# Patient Record
Sex: MALE | Race: WHITE | Employment: FULL TIME | URBAN - METROPOLITAN AREA
[De-identification: names, ages, dates, MRNs, and addresses within clinical notes are randomized per-mention and may not be internally consistent; named-entity substitution may affect disease eponyms.]

---

## 2018-01-16 ENCOUNTER — TELEPHONE (OUTPATIENT)
Dept: FAMILY MEDICINE CLINIC | Facility: CLINIC | Age: 63
End: 2018-01-16

## 2018-01-16 DIAGNOSIS — Z13.220 LIPID SCREENING: ICD-10-CM

## 2018-01-16 DIAGNOSIS — I10 ESSENTIAL HYPERTENSION, BENIGN: Primary | ICD-10-CM

## 2018-01-16 NOTE — TELEPHONE ENCOUNTER
Pt made physical appt and needs lab order to THE [facility].

## 2018-01-19 ENCOUNTER — APPOINTMENT (OUTPATIENT)
Dept: LAB | Age: 63
End: 2018-01-19
Attending: FAMILY MEDICINE
Payer: COMMERCIAL

## 2018-01-19 LAB
ALBUMIN SERPL-MCNC: 4.2 G/DL (ref 3.5–4.8)
ALP LIVER SERPL-CCNC: 62 U/L (ref 45–117)
ALT SERPL-CCNC: 58 U/L (ref 17–63)
AST SERPL-CCNC: 27 U/L (ref 15–41)
BILIRUB SERPL-MCNC: 0.7 MG/DL (ref 0.1–2)
BUN BLD-MCNC: 22 MG/DL (ref 8–20)
CALCIUM BLD-MCNC: 9.4 MG/DL (ref 8.3–10.3)
CHLORIDE: 105 MMOL/L (ref 101–111)
CHOLEST SMN-MCNC: 175 MG/DL (ref ?–200)
CO2: 30 MMOL/L (ref 22–32)
CREAT BLD-MCNC: 1.06 MG/DL (ref 0.7–1.3)
GLUCOSE BLD-MCNC: 103 MG/DL (ref 70–99)
HDLC SERPL-MCNC: 40 MG/DL (ref 45–?)
HDLC SERPL: 4.38 {RATIO} (ref ?–4.97)
LDLC SERPL CALC-MCNC: 105 MG/DL (ref ?–130)
M PROTEIN MFR SERPL ELPH: 7.4 G/DL (ref 6.1–8.3)
NONHDLC SERPL-MCNC: 135 MG/DL (ref ?–130)
POTASSIUM SERPL-SCNC: 4.5 MMOL/L (ref 3.6–5.1)
SODIUM SERPL-SCNC: 140 MMOL/L (ref 136–144)
TRIGL SERPL-MCNC: 148 MG/DL (ref ?–150)
VLDLC SERPL CALC-MCNC: 30 MG/DL (ref 5–40)

## 2018-01-19 PROCEDURE — 80053 COMPREHEN METABOLIC PANEL: CPT | Performed by: FAMILY MEDICINE

## 2018-01-19 PROCEDURE — 36415 COLL VENOUS BLD VENIPUNCTURE: CPT | Performed by: FAMILY MEDICINE

## 2018-01-19 PROCEDURE — 80061 LIPID PANEL: CPT | Performed by: FAMILY MEDICINE

## 2018-01-22 ENCOUNTER — OFFICE VISIT (OUTPATIENT)
Dept: FAMILY MEDICINE CLINIC | Facility: CLINIC | Age: 63
End: 2018-01-22

## 2018-01-22 VITALS
HEART RATE: 60 BPM | SYSTOLIC BLOOD PRESSURE: 130 MMHG | HEIGHT: 70.5 IN | RESPIRATION RATE: 16 BRPM | WEIGHT: 207 LBS | BODY MASS INDEX: 29.3 KG/M2 | DIASTOLIC BLOOD PRESSURE: 70 MMHG | TEMPERATURE: 98 F

## 2018-01-22 DIAGNOSIS — I10 ESSENTIAL HYPERTENSION, BENIGN: ICD-10-CM

## 2018-01-22 DIAGNOSIS — B00.9 HERPES SIMPLEX VIRUS (HSV) INFECTION: ICD-10-CM

## 2018-01-22 DIAGNOSIS — Z00.00 ANNUAL PHYSICAL EXAM: Primary | ICD-10-CM

## 2018-01-22 PROCEDURE — 99396 PREV VISIT EST AGE 40-64: CPT | Performed by: FAMILY MEDICINE

## 2018-01-22 RX ORDER — AMLODIPINE BESYLATE 2.5 MG/1
TABLET ORAL
Refills: 1 | COMMUNITY
Start: 2018-01-10 | End: 2020-10-02

## 2018-01-22 RX ORDER — FAMCICLOVIR 500 MG/1
500 TABLET, FILM COATED ORAL 3 TIMES DAILY
Qty: 42 TABLET | Refills: 5 | Status: SHIPPED | OUTPATIENT
Start: 2018-01-22 | End: 2018-03-14 | Stop reason: ALTCHOICE

## 2018-01-22 NOTE — PROGRESS NOTES
Patient presents with:  Physical: annual physical, labs completed, requesting refill on famciclovir     HPI:   Susie Erazo is a 60year old male with PMH HTN, SVT who presents for a complete physical exam. Last physical was over 2 yrs ago.      Sallye Nageotte Medical History:   Diagnosis Date   • Calculus of kidney 6/29/2012   • Essential hypertension 5/10/2013   • Paroxysmal supraventricular tachycardia ([facility] [address] 6/29/2012      Past Surgical History:  No date: COLONOSCOPY  1988: NECK/CHEST PROCEDURE UNLISTED physical, labs completed, requesting refill on famciclovir). 1. Annual physical exam  Overall healthy  exericse when able. Screening UTD  Labs wnl.      2. Herpes simplex virus (HSV) infection  Use meds RPN  - Famciclovir 500 MG Oral Tab;  Take 1 tablet

## 2018-03-14 ENCOUNTER — OFFICE VISIT (OUTPATIENT)
Dept: FAMILY MEDICINE CLINIC | Facility: CLINIC | Age: 63
End: 2018-03-14

## 2018-03-14 VITALS
WEIGHT: 213 LBS | DIASTOLIC BLOOD PRESSURE: 82 MMHG | OXYGEN SATURATION: 98 % | TEMPERATURE: 98 F | HEART RATE: 72 BPM | HEIGHT: 72 IN | SYSTOLIC BLOOD PRESSURE: 136 MMHG | RESPIRATION RATE: 16 BRPM | BODY MASS INDEX: 28.85 KG/M2

## 2018-03-14 DIAGNOSIS — R68.89 FLU-LIKE SYMPTOMS: ICD-10-CM

## 2018-03-14 DIAGNOSIS — R05.9 COUGH: Primary | ICD-10-CM

## 2018-03-14 PROCEDURE — 99213 OFFICE O/P EST LOW 20 MIN: CPT | Performed by: PHYSICIAN ASSISTANT

## 2018-03-14 RX ORDER — OSELTAMIVIR PHOSPHATE 75 MG/1
75 CAPSULE ORAL 2 TIMES DAILY
Qty: 10 CAPSULE | Refills: 0 | Status: SHIPPED | OUTPATIENT
Start: 2018-03-14 | End: 2020-10-02 | Stop reason: ALTCHOICE

## 2018-03-14 RX ORDER — CODEINE PHOSPHATE AND GUAIFENESIN 10; 100 MG/5ML; MG/5ML
5 SOLUTION ORAL 4 TIMES DAILY PRN
Qty: 180 ML | Refills: 0 | Status: SHIPPED | OUTPATIENT
Start: 2018-03-14 | End: 2020-10-02 | Stop reason: ALTCHOICE

## 2018-03-14 NOTE — PROGRESS NOTES
CC:  Patient presents with:  Cough  Fever      HPI: Lore Kaur presents with complaints of cough, ST, HA, chills, body aches, and low-grade fever. Symptoms have been present for 3 days. He has taken Nyquil and Tylenol/Motrin. No SOB or wheezing.     Smoking sta exudates; tonsils not enlarged   Eyes: PERRLA; no ulcers, abrasions, or FBs; lids normal; no edema; conjunctiva clear    Sinus: No TTP over frontal/ethmoid sinuses; no TTP over maxillary sinuses; no facial swelling   Neck: Normal ROM; no deformities or ten Diverticulosis     Internal hemorrhoids      No orders of the defined types were placed in this encounter.       Signed Prescriptions Disp Refills    guaiFENesin-codeine (CHERATUSSIN AC) 100-10 MG/5ML Oral Solution 180 mL 0      Sig: Take 5 mL by mouth 4 (f

## 2018-08-06 ENCOUNTER — APPOINTMENT (OUTPATIENT)
Dept: VASCULAR SURGERY | Facility: CLINIC | Age: 63
End: 2018-08-06
Payer: COMMERCIAL

## 2018-08-06 PROBLEM — Z00.00 ENCOUNTER FOR PREVENTIVE HEALTH EXAMINATION: Status: ACTIVE | Noted: 2018-08-06

## 2018-08-06 PROCEDURE — 93971 EXTREMITY STUDY: CPT

## 2018-08-08 ENCOUNTER — INPATIENT (INPATIENT)
Facility: HOSPITAL | Age: 63
LOS: 1 days | Discharge: HOME | DRG: 866 | End: 2018-08-10
Attending: SPECIALIST | Admitting: SPECIALIST
Payer: COMMERCIAL

## 2018-08-08 ENCOUNTER — TRANSCRIPTION ENCOUNTER (OUTPATIENT)
Age: 63
End: 2018-08-08

## 2018-08-08 VITALS
HEART RATE: 81 BPM | DIASTOLIC BLOOD PRESSURE: 82 MMHG | RESPIRATION RATE: 18 BRPM | SYSTOLIC BLOOD PRESSURE: 143 MMHG | TEMPERATURE: 98 F | OXYGEN SATURATION: 96 % | WEIGHT: 199.96 LBS

## 2018-08-08 DIAGNOSIS — B01.9 VARICELLA WITHOUT COMPLICATION: ICD-10-CM

## 2018-08-08 DIAGNOSIS — Z98.890 OTHER SPECIFIED POSTPROCEDURAL STATES: Chronic | ICD-10-CM

## 2018-08-08 DIAGNOSIS — R09.89 OTHER SPECIFIED SYMPTOMS AND SIGNS INVOLVING THE CIRCULATORY AND RESPIRATORY SYSTEMS: ICD-10-CM

## 2018-08-08 LAB
ALBUMIN SERPL ELPH-MCNC: 4 G/DL — SIGNIFICANT CHANGE UP (ref 3.3–5)
ALP SERPL-CCNC: 147 U/L — HIGH (ref 40–120)
ALT FLD-CCNC: 105 U/L — HIGH (ref 10–45)
ANION GAP SERPL CALC-SCNC: 9 MMOL/L — SIGNIFICANT CHANGE UP (ref 5–17)
APPEARANCE UR: CLEAR — SIGNIFICANT CHANGE UP
APTT BLD: 30.2 SEC — SIGNIFICANT CHANGE UP (ref 27.5–37.4)
AST SERPL-CCNC: 63 U/L — HIGH (ref 10–40)
BASOPHILS NFR BLD AUTO: 0.2 % — SIGNIFICANT CHANGE UP (ref 0–2)
BILIRUB SERPL-MCNC: 0.4 MG/DL — SIGNIFICANT CHANGE UP (ref 0.2–1.2)
BILIRUB UR-MCNC: NEGATIVE — SIGNIFICANT CHANGE UP
BUN SERPL-MCNC: 26 MG/DL — HIGH (ref 7–23)
CALCIUM SERPL-MCNC: 9.7 MG/DL — SIGNIFICANT CHANGE UP (ref 8.4–10.5)
CHLORIDE SERPL-SCNC: 104 MMOL/L — SIGNIFICANT CHANGE UP (ref 96–108)
CK SERPL-CCNC: 79 U/L — SIGNIFICANT CHANGE UP (ref 30–200)
CO2 SERPL-SCNC: 24 MMOL/L — SIGNIFICANT CHANGE UP (ref 22–31)
COLOR SPEC: YELLOW — SIGNIFICANT CHANGE UP
CREAT SERPL-MCNC: 1.05 MG/DL — SIGNIFICANT CHANGE UP (ref 0.5–1.3)
CRP SERPL-MCNC: 2.79 MG/DL — HIGH (ref 0–0.4)
DIFF PNL FLD: NEGATIVE — SIGNIFICANT CHANGE UP
EOSINOPHIL NFR BLD AUTO: 3.5 % — SIGNIFICANT CHANGE UP (ref 0–6)
ERYTHROCYTE [SEDIMENTATION RATE] IN BLOOD: 58 MM/HR — HIGH
GLUCOSE SERPL-MCNC: 123 MG/DL — HIGH (ref 70–99)
GLUCOSE UR QL: NEGATIVE — SIGNIFICANT CHANGE UP
HCT VFR BLD CALC: 39.9 % — SIGNIFICANT CHANGE UP (ref 39–50)
HGB BLD-MCNC: 13.4 G/DL — SIGNIFICANT CHANGE UP (ref 13–17)
INR BLD: 1.07 — SIGNIFICANT CHANGE UP (ref 0.88–1.16)
KETONES UR-MCNC: NEGATIVE — SIGNIFICANT CHANGE UP
LEUKOCYTE ESTERASE UR-ACNC: NEGATIVE — SIGNIFICANT CHANGE UP
LIDOCAIN IGE QN: 26 U/L — SIGNIFICANT CHANGE UP (ref 7–60)
LYMPHOCYTES # BLD AUTO: 14.4 % — SIGNIFICANT CHANGE UP (ref 13–44)
MAGNESIUM SERPL-MCNC: 2.2 MG/DL — SIGNIFICANT CHANGE UP (ref 1.6–2.6)
MCHC RBC-ENTMCNC: 28.9 PG — SIGNIFICANT CHANGE UP (ref 27–34)
MCHC RBC-ENTMCNC: 33.6 G/DL — SIGNIFICANT CHANGE UP (ref 32–36)
MCV RBC AUTO: 86.2 FL — SIGNIFICANT CHANGE UP (ref 80–100)
MONOCYTES NFR BLD AUTO: 10.8 % — SIGNIFICANT CHANGE UP (ref 2–14)
NEUTROPHILS NFR BLD AUTO: 71.1 % — SIGNIFICANT CHANGE UP (ref 43–77)
NITRITE UR-MCNC: NEGATIVE — SIGNIFICANT CHANGE UP
PH UR: 5.5 — SIGNIFICANT CHANGE UP (ref 5–8)
PLATELET # BLD AUTO: 220 K/UL — SIGNIFICANT CHANGE UP (ref 150–400)
POTASSIUM SERPL-MCNC: 4.2 MMOL/L — SIGNIFICANT CHANGE UP (ref 3.5–5.3)
POTASSIUM SERPL-SCNC: 4.2 MMOL/L — SIGNIFICANT CHANGE UP (ref 3.5–5.3)
PROT SERPL-MCNC: 7.4 G/DL — SIGNIFICANT CHANGE UP (ref 6–8.3)
PROT UR-MCNC: NEGATIVE MG/DL — SIGNIFICANT CHANGE UP
PROTHROM AB SERPL-ACNC: 11.9 SEC — SIGNIFICANT CHANGE UP (ref 9.8–12.7)
RBC # BLD: 4.63 M/UL — SIGNIFICANT CHANGE UP (ref 4.2–5.8)
RBC # FLD: 12.2 % — SIGNIFICANT CHANGE UP (ref 10.3–16.9)
SODIUM SERPL-SCNC: 137 MMOL/L — SIGNIFICANT CHANGE UP (ref 135–145)
SP GR SPEC: 1.02 — SIGNIFICANT CHANGE UP (ref 1–1.03)
UROBILINOGEN FLD QL: 0.2 E.U./DL — SIGNIFICANT CHANGE UP
VZV IGG FLD QL IA: 721.5 INDEX — SIGNIFICANT CHANGE UP
VZV IGG FLD QL IA: POSITIVE — SIGNIFICANT CHANGE UP
WBC # BLD: 9.1 K/UL — SIGNIFICANT CHANGE UP (ref 3.8–10.5)
WBC # FLD AUTO: 9.1 K/UL — SIGNIFICANT CHANGE UP (ref 3.8–10.5)

## 2018-08-08 PROCEDURE — 71045 X-RAY EXAM CHEST 1 VIEW: CPT | Mod: 26

## 2018-08-08 PROCEDURE — 73590 X-RAY EXAM OF LOWER LEG: CPT | Mod: 26,RT

## 2018-08-08 PROCEDURE — 99285 EMERGENCY DEPT VISIT HI MDM: CPT | Mod: 25

## 2018-08-08 PROCEDURE — 93010 ELECTROCARDIOGRAM REPORT: CPT

## 2018-08-08 PROCEDURE — 73560 X-RAY EXAM OF KNEE 1 OR 2: CPT | Mod: 26,RT

## 2018-08-08 RX ORDER — OXYCODONE HYDROCHLORIDE 5 MG/1
5 TABLET ORAL EVERY 4 HOURS
Qty: 0 | Refills: 0 | Status: DISCONTINUED | OUTPATIENT
Start: 2018-08-08 | End: 2018-08-08

## 2018-08-08 RX ORDER — CELECOXIB 200 MG/1
200 CAPSULE ORAL DAILY
Qty: 0 | Refills: 0 | Status: DISCONTINUED | OUTPATIENT
Start: 2018-08-08 | End: 2018-08-08

## 2018-08-08 RX ORDER — HEPARIN SODIUM 5000 [USP'U]/ML
5000 INJECTION INTRAVENOUS; SUBCUTANEOUS EVERY 8 HOURS
Qty: 0 | Refills: 0 | Status: DISCONTINUED | OUTPATIENT
Start: 2018-08-08 | End: 2018-08-08

## 2018-08-08 RX ORDER — ONDANSETRON 8 MG/1
4 TABLET, FILM COATED ORAL EVERY 4 HOURS
Qty: 0 | Refills: 0 | Status: DISCONTINUED | OUTPATIENT
Start: 2018-08-08 | End: 2018-08-10

## 2018-08-08 RX ORDER — GABAPENTIN 400 MG/1
300 CAPSULE ORAL
Qty: 0 | Refills: 0 | Status: DISCONTINUED | OUTPATIENT
Start: 2018-08-08 | End: 2018-08-10

## 2018-08-08 RX ORDER — MORPHINE SULFATE 50 MG/1
4 CAPSULE, EXTENDED RELEASE ORAL EVERY 4 HOURS
Qty: 0 | Refills: 0 | Status: DISCONTINUED | OUTPATIENT
Start: 2018-08-08 | End: 2018-08-08

## 2018-08-08 RX ORDER — OXYCODONE HYDROCHLORIDE 5 MG/1
10 TABLET ORAL EVERY 4 HOURS
Qty: 0 | Refills: 0 | Status: DISCONTINUED | OUTPATIENT
Start: 2018-08-08 | End: 2018-08-10

## 2018-08-08 RX ORDER — OXYCODONE HYDROCHLORIDE 5 MG/1
10 TABLET ORAL EVERY 4 HOURS
Qty: 0 | Refills: 0 | Status: DISCONTINUED | OUTPATIENT
Start: 2018-08-08 | End: 2018-08-08

## 2018-08-08 RX ORDER — ACYCLOVIR SODIUM 500 MG
VIAL (EA) INTRAVENOUS
Qty: 0 | Refills: 0 | Status: DISCONTINUED | OUTPATIENT
Start: 2018-08-08 | End: 2018-08-09

## 2018-08-08 RX ORDER — MORPHINE SULFATE 50 MG/1
2 CAPSULE, EXTENDED RELEASE ORAL EVERY 4 HOURS
Qty: 0 | Refills: 0 | Status: DISCONTINUED | OUTPATIENT
Start: 2018-08-08 | End: 2018-08-10

## 2018-08-08 RX ORDER — IBUPROFEN 200 MG
400 TABLET ORAL
Qty: 0 | Refills: 0 | Status: DISCONTINUED | OUTPATIENT
Start: 2018-08-08 | End: 2018-08-10

## 2018-08-08 RX ORDER — DOCUSATE SODIUM 100 MG
100 CAPSULE ORAL THREE TIMES A DAY
Qty: 0 | Refills: 0 | Status: DISCONTINUED | OUTPATIENT
Start: 2018-08-08 | End: 2018-08-10

## 2018-08-08 RX ORDER — CEFAZOLIN SODIUM 1 G
1000 VIAL (EA) INJECTION ONCE
Qty: 0 | Refills: 0 | Status: DISCONTINUED | OUTPATIENT
Start: 2018-08-08 | End: 2018-08-08

## 2018-08-08 RX ORDER — ASPIRIN/CALCIUM CARB/MAGNESIUM 324 MG
325 TABLET ORAL
Qty: 0 | Refills: 0 | Status: DISCONTINUED | OUTPATIENT
Start: 2018-08-08 | End: 2018-08-10

## 2018-08-08 RX ORDER — SODIUM CHLORIDE 9 MG/ML
1000 INJECTION, SOLUTION INTRAVENOUS
Qty: 0 | Refills: 0 | Status: DISCONTINUED | OUTPATIENT
Start: 2018-08-08 | End: 2018-08-08

## 2018-08-08 RX ORDER — MAGNESIUM HYDROXIDE 400 MG/1
30 TABLET, CHEWABLE ORAL DAILY
Qty: 0 | Refills: 0 | Status: DISCONTINUED | OUTPATIENT
Start: 2018-08-08 | End: 2018-08-10

## 2018-08-08 RX ORDER — PANTOPRAZOLE SODIUM 20 MG/1
40 TABLET, DELAYED RELEASE ORAL
Qty: 0 | Refills: 0 | Status: DISCONTINUED | OUTPATIENT
Start: 2018-08-08 | End: 2018-08-10

## 2018-08-08 RX ORDER — ACYCLOVIR SODIUM 500 MG
900 VIAL (EA) INTRAVENOUS EVERY 8 HOURS
Qty: 0 | Refills: 0 | Status: DISCONTINUED | OUTPATIENT
Start: 2018-08-08 | End: 2018-08-09

## 2018-08-08 RX ORDER — ACYCLOVIR SODIUM 500 MG
900 VIAL (EA) INTRAVENOUS ONCE
Qty: 0 | Refills: 0 | Status: COMPLETED | OUTPATIENT
Start: 2018-08-08 | End: 2018-08-08

## 2018-08-08 RX ORDER — CEFAZOLIN SODIUM 1 G
2000 VIAL (EA) INJECTION EVERY 8 HOURS
Qty: 0 | Refills: 0 | Status: DISCONTINUED | OUTPATIENT
Start: 2018-08-08 | End: 2018-08-08

## 2018-08-08 RX ORDER — ACETAMINOPHEN 500 MG
650 TABLET ORAL EVERY 6 HOURS
Qty: 0 | Refills: 0 | Status: DISCONTINUED | OUTPATIENT
Start: 2018-08-08 | End: 2018-08-08

## 2018-08-08 RX ORDER — OXYCODONE HYDROCHLORIDE 5 MG/1
5 TABLET ORAL EVERY 4 HOURS
Qty: 0 | Refills: 0 | Status: DISCONTINUED | OUTPATIENT
Start: 2018-08-08 | End: 2018-08-10

## 2018-08-08 RX ORDER — CALCIUM CARBONATE 500(1250)
2 TABLET ORAL EVERY 8 HOURS
Qty: 0 | Refills: 0 | Status: DISCONTINUED | OUTPATIENT
Start: 2018-08-08 | End: 2018-08-10

## 2018-08-08 RX ORDER — CALCIUM CARBONATE 500(1250)
1 TABLET ORAL DAILY
Qty: 0 | Refills: 0 | Status: DISCONTINUED | OUTPATIENT
Start: 2018-08-08 | End: 2018-08-08

## 2018-08-08 RX ORDER — ACETAMINOPHEN 500 MG
650 TABLET ORAL EVERY 6 HOURS
Qty: 0 | Refills: 0 | Status: DISCONTINUED | OUTPATIENT
Start: 2018-08-08 | End: 2018-08-10

## 2018-08-08 RX ORDER — DIPHENHYDRAMINE HCL 50 MG
25 CAPSULE ORAL EVERY 4 HOURS
Qty: 0 | Refills: 0 | Status: DISCONTINUED | OUTPATIENT
Start: 2018-08-08 | End: 2018-08-10

## 2018-08-08 RX ORDER — AMLODIPINE BESYLATE 2.5 MG/1
2.5 TABLET ORAL DAILY
Qty: 0 | Refills: 0 | Status: DISCONTINUED | OUTPATIENT
Start: 2018-08-08 | End: 2018-08-10

## 2018-08-08 RX ADMIN — Medication 2000 MILLIGRAM(S): at 09:23

## 2018-08-08 RX ADMIN — Medication 25 MILLIGRAM(S): at 15:34

## 2018-08-08 RX ADMIN — Medication 268 MILLIGRAM(S): at 21:01

## 2018-08-08 RX ADMIN — GABAPENTIN 300 MILLIGRAM(S): 400 CAPSULE ORAL at 16:47

## 2018-08-08 RX ADMIN — OXYCODONE HYDROCHLORIDE 10 MILLIGRAM(S): 5 TABLET ORAL at 23:09

## 2018-08-08 RX ADMIN — Medication 2 TABLET(S): at 21:01

## 2018-08-08 RX ADMIN — OXYCODONE HYDROCHLORIDE 10 MILLIGRAM(S): 5 TABLET ORAL at 12:19

## 2018-08-08 RX ADMIN — CELECOXIB 200 MILLIGRAM(S): 200 CAPSULE ORAL at 15:36

## 2018-08-08 RX ADMIN — Medication 325 MILLIGRAM(S): at 16:47

## 2018-08-08 RX ADMIN — SODIUM CHLORIDE 100 MILLILITER(S): 9 INJECTION, SOLUTION INTRAVENOUS at 06:31

## 2018-08-08 RX ADMIN — Medication 100 MILLIGRAM(S): at 14:08

## 2018-08-08 RX ADMIN — PANTOPRAZOLE SODIUM 40 MILLIGRAM(S): 20 TABLET, DELAYED RELEASE ORAL at 07:26

## 2018-08-08 RX ADMIN — HEPARIN SODIUM 5000 UNIT(S): 5000 INJECTION INTRAVENOUS; SUBCUTANEOUS at 06:32

## 2018-08-08 RX ADMIN — Medication 268 MILLIGRAM(S): at 11:12

## 2018-08-08 RX ADMIN — MAGNESIUM HYDROXIDE 30 MILLILITER(S): 400 TABLET, CHEWABLE ORAL at 14:07

## 2018-08-08 RX ADMIN — Medication 100 MILLIGRAM(S): at 06:32

## 2018-08-08 RX ADMIN — Medication 100 MILLIGRAM(S): at 21:01

## 2018-08-08 NOTE — ED PROVIDER NOTE - MEDICAL DECISION MAKING DETAILS
Case discussed with DR Salinas and Ortho at bedside with plans for IV ancef and admission with ID to evaluate today

## 2018-08-08 NOTE — ED PROVIDER NOTE - PHYSICAL EXAMINATION
right knee with surgical sites x2  intact steri strips and some dried blood at portals  + knee effusion and  anterior leg tenderness with hyper sensitivity noted mid anterior leg with rubrous region markedly tenderness all compartments soft and ankle / foot FROM and MS intact pulses palpable DP/PT cap refill brisk calf supple and non tender sensation intact LT Posterior medial knee with ecchymoses as well noted

## 2018-08-08 NOTE — ED PROVIDER NOTE - ATTENDING CONTRIBUTION TO CARE
Addendum to attending statement: I have reviewed the ACP note and agree with the history, exam, and plan of care. I  was available to ALISE Mckinley  as a supervising provider if needed.   Pt s/p right knee meniscal repair 7/31 Dr Salinas with post operative complicated course with recurrent fevers ~ 100.5  despite antipyretics as stated redness warmth and mid anterior leg tenderness -Neg Doppler 2 days ago on Monday  and Antibiotics since surgery PO without improvement and thus sent to ED for ortho admission and IV antibiotics with ID consult. Pt seen by ortho and for admission. IV abx given

## 2018-08-08 NOTE — DISCHARGE NOTE ADULT - PATIENT PORTAL LINK FT
You can access the [facility] Patient Portal, offered by [facility], by registering with the following website: [email or web address]

## 2018-08-08 NOTE — DISCHARGE NOTE ADULT - MEDICATION SUMMARY - MEDICATIONS TO TAKE
I will START or STAY ON the medications listed below when I get home from the hospital:    ibuprofen 400 mg oral tablet  -- 1 tab(s) by mouth 2 times a day, As needed, mild pain  -- Indication: For mild pain    aspirin 325 mg oral tablet  -- 1 tab(s) by mouth 2 times a day x 3 weeks from the date of surgery  -- Indication: For Clot prevention    Percocet 5/325 oral tablet  -- 1 to 2 tab(s) by mouth every 4 hours, As Needed  -- Indication: For moderate to severe postoperative pain    gabapentin 300 mg oral capsule  -- 1 cap(s) by mouth 2 times a day  -- Indication: For nerve pain    Valtrex 1 g oral tablet  -- 1 tab(s) by mouth 3 times a day for 5 days total  -- It is very important that you take or use this exactly as directed.  Do not skip doses or discontinue unless directed by your doctor.    -- Indication: For Anti viral    amLODIPine 2.5 mg oral tablet  -- 1 tab(s) by mouth once a day  -- Indication: For HTN    clindamycin 1% topical solution  -- Apply on skin to affected area (back rash) 2 times a day for 1 week total  Dispense 1 bottle  -- For external use only.    -- Indication: For Topical antibiotic    docusate sodium 100 mg oral capsule  -- 1 cap(s) by mouth 3 times a day  -- Indication: For constipation    Bactrim  mg-160 mg oral tablet  -- 1 tab(s) by mouth 2 times a day for 5 days total  -- Avoid prolonged or excessive exposure to direct and/or artificial sunlight while taking this medication.  Finish all this medication unless otherwise directed by prescriber.  Medication should be taken with plenty of water.    -- Indication: For Oral antibiotic

## 2018-08-08 NOTE — DISCHARGE NOTE ADULT - ADDITIONAL INSTRUCTIONS
No strenuous activity, heavy lifting, driving, tub bathing, or returning to work until cleared by MD.  You may shower--dressing is waterproof.  Remove dressing after post op day 5, then leave incision open to air.  Follow up with  __________ call:___________ to schedule an appt within 10-14 days.  If you don't have a bowel movement by post op day 3, then take Milk of Magnesia (over the counter).  If no bowel movement by at least post op day 5, then use a Dulcolax suppository (over the counter) and/or a Fleets enema--if still no bowel movement, call your MD.  Contact your doctor if you experience: fever greater than 101.5, chills, chest pain, difficulty breathing, bleeding, redness or heat around the incision.    Please follow up with your primary care provider. No strenuous activity, heavy lifting, driving, tub bathing, or returning to work until cleared by MD.  You may shower--dressing is waterproof.  Remove dressing after post op day 7, then leave incision open to air.  You are non weight bearing of the right lower extremity.  You may bend knee up to 70 degrees, otherwise knee immobilizer at all times.  Follow up with Dr. Salinas in his office in 2 weeks.  Any staples/sutures will be removed in his office.  If you don't have a bowel movement by post op day 3, then take Milk of Magnesia (over the counter).  If no bowel movement by at least post op day 5, then use a Dulcolax suppository (over the counter) and/or a Fleets enema--if still no bowel movement, call your MD.  Contact your doctor if you experience: fever greater than 101.5, chills, chest pain, difficulty breathing, bleeding, redness or heat around the incision.    Please follow up with your primary care provider. No strenuous activity, heavy lifting, driving, tub bathing, or returning to work until cleared by MD.  You may shower, no soaking.    You may leave incision open to air.  You are non weight bearing of the right lower extremity.  You may bend knee up to 70 degrees, otherwise knee immobilizer at all times.  Follow up with Dr. Salinas in his office in 1 week.  Any staples/sutures will be removed in his office.  Please take a bowel regimen (over the counter) to help prevent constipation.  Contact your doctor if you experience: fever greater than 101.5, chills, chest pain, difficulty breathing, bleeding, redness or heat around the incision.    Dr Dietz will call you this weekend to check on you. Please take the full course of antibiotics unless you develop new/worsening rash and fevers - if that happens, please call Dr Dietz's office.    Please follow up with your primary care provider.

## 2018-08-08 NOTE — CONSULT NOTE ADULT - ASSESSMENT
rash most consistent with chicken pox or disseminated zoster    it is possible this could be a drug rash, but the vesicular nature of the rash is more suggestive of a viral exanthem DISCHARGE

## 2018-08-08 NOTE — ED PROVIDER NOTE - CARE PLAN
Principal Discharge DX:	Post surgical complication  Secondary Diagnosis:	Leg pain  Secondary Diagnosis:	Knee pain

## 2018-08-08 NOTE — ED ADULT NURSE NOTE - OBJECTIVE STATEMENT
pt. presented with c/o pain, swelling and redness to anterior Rt lower leg and fever s/p meniscus repair on July 31st. pt. reports he had doppler test on Monday and it was negative. pt. is on antibiotics, oxycodone and Advil. pt. is able to wiggle his toes and move his ankle of Rt leg. rash like redness noted to the Rt shin area, the Rt knee is swollen and warm to touch.

## 2018-08-08 NOTE — PROGRESS NOTE ADULT - SUBJECTIVE AND OBJECTIVE BOX
ORTHO NOTE    [ ] Pt seen/examined at 8:45 AM  [ ] Pt without any complaints/in NAD.    [ ] Pt complains of:      ROS: [ ] Fever  [ ] Chills  [ ] CP [ ] SOB [ ] Dysnea  [ ] Palpitations [ ] Cough [ ] N/V/C/D [ ] Paresthia [ ] Other     [ ] ROS  otherwise negative    .    PHYSICAL EXAM:    Vital Signs Last 24 Hrs  T(C): 36.9 (08 Aug 2018 06:15), Max: 36.9 (08 Aug 2018 06:15)  T(F): 98.5 (08 Aug 2018 06:15), Max: 98.5 (08 Aug 2018 06:15)  HR: 78 (08 Aug 2018 06:15) (78 - 81)  BP: 136/80 (08 Aug 2018 06:15) (136/80 - 143/82)  BP(mean): --  RR: 18 (08 Aug 2018 06:15) (18 - 18)  SpO2: 94% (08 Aug 2018 06:15) (94% - 96%)    I&O's Detail       CAPILLARY BLOOD GLUCOSE                      Neuro:    Lungs:    CV:    ABD:     Ext:    LABS                        13.4   9.1   )-----------( 220      ( 08 Aug 2018 06:07 )             39.9                              PT/INR - ( 08 Aug 2018 06:09 )   PT: 11.9 sec;   INR: 1.07          PTT - ( 08 Aug 2018 06:09 )  PTT:30.2 sec  08-08    137  |  104  |  26<H>  ----------------------------<  123<H>  4.2   |  24  |  1.05    Ca    9.7      08 Aug 2018 06:05  Mg     2.2     08-08    TPro  7.4  /  Alb  4.0  /  TBili  0.4  /  DBili  x   /  AST  63<H>  /  ALT  105<H>  /  AlkPhos  147<H>  08-08      [ ] Other Labs  [ ] None ordered            Please check or [place] when present:  •  Heart Failure:    [ ] Acute        [ ]  Acute on Chronic        [ ] Chronic         [ ] Diastolic     [ ]  Combined    •  LANE:     [ ] ATN        [ ]  Renal medullary necrosis       [ ]  Renal cortical necrosis                  [ ] Other pathological Lesion:  •  CKD:  [ ] Stage I   [ ] Stage II  [ ] Stage III    [ ]Stage IV   [ ]  CKD V   [ ]  Other/Unspecified:    •  Abdominal Nutritional Status:   [ ] Malnutrition-See Nutrition note    [ ] Cachexia   [ ]  Other        [ ] Supplement ordered:            [ ] Morbid Obesity: BMI >=40         ASSESSMENT/PLAN:      STATUS POST: R knee scope and medial meniscal root repair pod 8    CONTINUE:          [ ] PT nwb, knee flexion up to 70 degrees, otherwise KI at all times    [ ] DVT PPX- scd, and ASA x 3 weeks postop    [ ] Pain Mgt adjusted regimen    [ ] Dispo plan- home    F/u ID Dr baron recs.  Con't IV ancef for now.  Will place on contact/airborne precautions for r/o disseminated shingles. ORTHO NOTE    [ ] Pt seen/examined at 8:45 AM  [ ] Pt without any complaints/in NAD.    [ ] Pt complains of:      ROS: [ ] Fever  [ ] Chills  [ ] CP [ ] SOB [ ] Dysnea  [ ] Palpitations [ ] Cough [ ] N/V/C/D [ ] Paresthia [ ] Other     [ ] ROS  otherwise negative    .    PHYSICAL EXAM:    Vital Signs Last 24 Hrs  T(C): 36.9 (08 Aug 2018 06:15), Max: 36.9 (08 Aug 2018 06:15)  T(F): 98.5 (08 Aug 2018 06:15), Max: 98.5 (08 Aug 2018 06:15)  HR: 78 (08 Aug 2018 06:15) (78 - 81)  BP: 136/80 (08 Aug 2018 06:15) (136/80 - 143/82)  BP(mean): --  RR: 18 (08 Aug 2018 06:15) (18 - 18)  SpO2: 94% (08 Aug 2018 06:15) (94% - 96%)    I&O's Detail       CAPILLARY BLOOD GLUCOSE                      Neuro:  NAD A and O x 3    Lungs:    CV:    ABD: softly distended n/t    Ext: RLE TA/GS/EHL/FHL 5/5 silt, + edema, surgical incisions healing well, +     back rash with diffusely scattered erythematous macules, papules, and vesicles    LABS                        13.4   9.1   )-----------( 220      ( 08 Aug 2018 06:07 )             39.9                              PT/INR - ( 08 Aug 2018 06:09 )   PT: 11.9 sec;   INR: 1.07          PTT - ( 08 Aug 2018 06:09 )  PTT:30.2 sec  08-08    137  |  104  |  26<H>  ----------------------------<  123<H>  4.2   |  24  |  1.05    Ca    9.7      08 Aug 2018 06:05  Mg     2.2     08-08    TPro  7.4  /  Alb  4.0  /  TBili  0.4  /  DBili  x   /  AST  63<H>  /  ALT  105<H>  /  AlkPhos  147<H>  08-08      [ ] Other Labs  [ ] None ordered            Please check or [place] when present:  •  Heart Failure:    [ ] Acute        [ ]  Acute on Chronic        [ ] Chronic         [ ] Diastolic     [ ]  Combined    •  LANE:     [ ] ATN        [ ]  Renal medullary necrosis       [ ]  Renal cortical necrosis                  [ ] Other pathological Lesion:  •  CKD:  [ ] Stage I   [ ] Stage II  [ ] Stage III    [ ]Stage IV   [ ]  CKD V   [ ]  Other/Unspecified:    •  Abdominal Nutritional Status:   [ ] Malnutrition-See Nutrition note    [ ] Cachexia   [ ]  Other        [ ] Supplement ordered:            [ ] Morbid Obesity: BMI >=40         ASSESSMENT/PLAN:      STATUS POST: R knee scope and medial meniscal root repair pod 8    CONTINUE:          [ ] PT nwb, knee flexion up to 70 degrees, otherwise KI at all times    [ ] DVT PPX- scd, and ASA x 3 weeks postop    [ ] Pain Mgt adjusted regimen    [ ] Dispo plan- home    F/u ID Dr baron recs.  Con't IV ancef for now.  Will place on contact/airborne precautions for r/o disseminated shingles.  F/u urine and blood cx.  F/u Le doppler, r/o dvt.  F/u XRs.  IS use.  Bowel regimen. ORTHO NOTE    [X] Pt seen/examined at 8:45 AM  [ ] Pt without any complaints/in NAD.    [X ] Pt complains of:  R shin rash, R shin pain that started last Wednesday.  Also c/o L sided face rash, not painful.  c/o back rash, not painful or itchy.  He took po keflex for 4 to 5 days with no improvement.  Then put on Augmentin and gabapentin for 1 day.  Last BM yesterday.      ROS: [ ] Fever  [ ] Chills  [ ] CP [ ] SOB [ ] Dysnea  [ ] Palpitations [ ] Cough [ ] N/V/C/D [ ] Paresthia [ ] Other     [X ] ROS  otherwise negative    .    PHYSICAL EXAM:    Vital Signs Last 24 Hrs  T(C): 36.9 (08 Aug 2018 06:15), Max: 36.9 (08 Aug 2018 06:15)  T(F): 98.5 (08 Aug 2018 06:15), Max: 98.5 (08 Aug 2018 06:15)  HR: 78 (08 Aug 2018 06:15) (78 - 81)  BP: 136/80 (08 Aug 2018 06:15) (136/80 - 143/82)  BP(mean): --  RR: 18 (08 Aug 2018 06:15) (18 - 18)  SpO2: 94% (08 Aug 2018 06:15) (94% - 96%)    I&O's Detail       CAPILLARY BLOOD GLUCOSE                      Neuro:  NAD A and O x 3    Lungs:    CV:    ABD: softly distended n/t    Ext: RLE TA/GS/EHL/FHL 5/5 silt, + edema, surgical incisions healing well, + erythematous rash on right shin    back rash with diffusely scattered erythematous macules, papules, and vesicles    LABS                        13.4   9.1   )-----------( 220      ( 08 Aug 2018 06:07 )             39.9                              PT/INR - ( 08 Aug 2018 06:09 )   PT: 11.9 sec;   INR: 1.07          PTT - ( 08 Aug 2018 06:09 )  PTT:30.2 sec  08-08    137  |  104  |  26<H>  ----------------------------<  123<H>  4.2   |  24  |  1.05    Ca    9.7      08 Aug 2018 06:05  Mg     2.2     08-08    TPro  7.4  /  Alb  4.0  /  TBili  0.4  /  DBili  x   /  AST  63<H>  /  ALT  105<H>  /  AlkPhos  147<H>  08-08      [ ] Other Labs  [ ] None ordered            Please check or [place] when present:  •  Heart Failure:    [ ] Acute        [ ]  Acute on Chronic        [ ] Chronic         [ ] Diastolic     [ ]  Combined    •  LANE:     [ ] ATN        [ ]  Renal medullary necrosis       [ ]  Renal cortical necrosis                  [ ] Other pathological Lesion:  •  CKD:  [ ] Stage I   [ ] Stage II  [ ] Stage III    [ ]Stage IV   [ ]  CKD V   [ ]  Other/Unspecified:    •  Abdominal Nutritional Status:   [ ] Malnutrition-See Nutrition note    [ ] Cachexia   [ ]  Other        [ ] Supplement ordered:            [ ] Morbid Obesity: BMI >=40         ASSESSMENT/PLAN:      STATUS POST: R knee scope and medial meniscal root repair pod 8    CONTINUE:          [ ] PT nwb, knee flexion up to 70 degrees, otherwise KI at all times    [ ] DVT PPX- scd, and ASA x 3 weeks postop    [ ] Pain Mgt adjusted regimen    [ ] Dispo plan- home    F/u ID Dr baron recs.  Con't IV ancef for now.  Will place on contact/airborne precautions for r/o disseminated shingles.  F/u urine and blood cx.  F/u Le doppler, r/o dvt.  F/u XRs.  IS use.  Bowel regimen.

## 2018-08-08 NOTE — DISCHARGE NOTE ADULT - CARE PLAN
Goal:	improvement after surgery  Assessment and plan of treatment:	see below Principal Discharge DX:	Viral exanthem  Goal:	pain control, rash resolution  Assessment and plan of treatment:	see below

## 2018-08-08 NOTE — DISCHARGE NOTE ADULT - CARE PROVIDER_API CALL
Willie Salinas), Orthopaedic Surgery  [address] [place] [address]  Phone: [phone]  Fax: [phone] Willie Salinas), Orthopaedic Surgery  [address] [address]  Phone: [phone]  Fax: [phone]    Jimbo Dietz), Infectious Disease; Internal Medicine  [address] [address]  Phone: [phone]  Fax: [phone]

## 2018-08-08 NOTE — PROGRESS NOTE ADULT - SUBJECTIVE AND OBJECTIVE BOX
7:40AM    Patient reports decreased pain but now notes a similar "rash" on his face.    Exam:  No pain on passive motion.  decreased tenderness  erythema distal tibia  knee would is clean and dry and no erythema.      Labs and xrays reviewed.    A/P  Patient is s/p meniscus root repair with postoperative distal tibia pain and erythema. Doppler was negative.  The patient was put on oral antibiotics ( keflex and then Augmentin) but did not respond. Continued to have sever distal tibia pain and low grade fever but not above 100.5.  I advised him to go to ER for admission, IV antibiotics and Infectious disease consult.   WBC  and C reactive protein are marginally elevated. Awaiting ID consult to evaluate if definitively infectious process and determine appropriate antibiotics and duration.  The knee does not look affected as the erythema is well distal.           CBC Full  -  ( 08 Aug 2018 06:07 )  WBC Count : 9.1 K/uL  Hemoglobin : 13.4 g/dL  Hematocrit : 39.9 %  Platelet Count - Automated : 220 K/uL  Mean Cell Volume : 86.2 fL  Mean Cell Hemoglobin : 28.9 pg  Mean Cell Hemoglobin Concentration : 33.6 g/dL  Auto Neutrophil # : x  Auto Lymphocyte # : x  Auto Monocyte # : x  Auto Eosinophil # : x  Auto Basophil # : x  Auto Neutrophil % : 71.1 %  Auto Lymphocyte % : 14.4 %  Auto Monocyte % : 10.8 %  Auto Eosinophil % : 3.5 %  Auto Basophil % : 0.2 %

## 2018-08-08 NOTE — ED PROVIDER NOTE - OBJECTIVE STATEMENT
Right knee meniscal repair 7/31 Dr Salinas with post operative complicated course with recurrent fevers ~ 100.5  despite antipyretics as stated redness warmth and mid anterior leg tenderness -Neg Doppler 2 days ago on Monday  and Antibiotics since surgery PO without improvement and thus sent to ED for ortho admission and IV antibiotics with ID consult.

## 2018-08-08 NOTE — H&P ADULT - NSHPPHYSICALEXAM_GEN_ALL_CORE
WWP, BCR  DP/PT  Motor: EHL/FHL/GS/AT  Sensory: DP/SP/AT, MP/LP/S/S SILT  ROM: Limited ROM FOR THE R KNee  Deformity: Swelling RLE, rash on the an shin, tender

## 2018-08-08 NOTE — CONSULT NOTE ADULT - SUBJECTIVE AND OBJECTIVE BOX
HPI:  Patient is 62 year old s/p RLE knee arthroscopy with medial meniscal root repair inside out. Since 8/2 patient has developed swelling of the RLE and rash on the anterior shin. Patient took oral antibiotics (Keflex and Augmentin) for it without any improvement. Similar rash appeared on the face and its very painful only on the shin. Patient has been sent in for cellulitis and IV antibiotics (08 Aug 2018 06:05)    no recent exposure chicken pox or  shingles      PAST MEDICAL & SURGICAL HISTORY:  Hypertension, unspecified type  H/O prior ablation treatment  S/P right knee arthroscopy      REVIEW OF SYSTEMS:      Eyes: No eye pain, visual disturbances, or discharge  ENMT:  No difficulty hearing, tinnitus, vertigo; No sinus or throat pain  Neck: No pain or stiffness  Respiratory: No cough, wheezing, chills or hemoptysis  Cardiovascular: No chest pain, palpitations, shortness of breath, dizziness or leg swelling  Gastrointestinal: No abdominal or epigastric pain. No nausea, vomiting or hematemesis; No diarrhea or constipation. No melena or hematochezia.  Genitourinary: No dysuria, frequency, hematuria or incontinence  Neurological: No headaches, memory loss, loss of strength, numbness or tremors  Skin: initial rash on left lower leg  now rash on back and chest  Lymph Nodes: No enlarged glands  Endocrine: No heat or cold intolerance; No hair loss  Musculoskeletal: No joint pain or swelling; No muscle, back or extremity pain  Heme/Lymph: No easy bruising or bleeding gums  Allergy and Immunologic: No hives or eczema    MEDICATIONS  (STANDING):  acyclovir IVPB 900 milliGRAM(s) IV Intermittent once  acyclovir IVPB 900 milliGRAM(s) IV Intermittent every 8 hours  acyclovir IVPB      amLODIPine   Tablet 2.5 milliGRAM(s) Oral daily  aspirin 325 milliGRAM(s) Oral two times a day  celecoxib 200 milliGRAM(s) Oral daily  docusate sodium 100 milliGRAM(s) Oral three times a day  gabapentin 300 milliGRAM(s) Oral two times a day  lactated ringers. 1000 milliLiter(s) (100 mL/Hr) IV Continuous <Continuous>  magnesium hydroxide Suspension 30 milliLiter(s) Oral daily  pantoprazole    Tablet 40 milliGRAM(s) Oral before breakfast    MEDICATIONS  (PRN):  acetaminophen   Tablet 650 milliGRAM(s) Oral every 6 hours PRN For Temp greater than 38 C (100.4 F)  morphine  - Injectable 2 milliGRAM(s) IV Push every 4 hours PRN Severe Pain (7 - 10)  ondansetron Injectable 4 milliGRAM(s) IV Push every 4 hours PRN Nausea and/or Vomiting  oxyCODONE    IR 10 milliGRAM(s) Oral every 4 hours PRN Severe Pain (7 - 10)  oxyCODONE    IR 5 milliGRAM(s) Oral every 4 hours PRN Moderate Pain (4 - 6)      acyclovir IVPB 900 milliGRAM(s) IV Intermittent once  acyclovir IVPB 900 milliGRAM(s) IV Intermittent every 8 hours  acyclovir IVPB          Allergies    No Known Allergies    Intolerances        SOCIAL HISTORY:    FAMILY HISTORY:  No pertinent family history in first degree relatives      Vital Signs Last 24 Hrs  T(C): 36.9 (08 Aug 2018 06:15), Max: 36.9 (08 Aug 2018 06:15)  T(F): 98.5 (08 Aug 2018 06:15), Max: 98.5 (08 Aug 2018 06:15)  HR: 78 (08 Aug 2018 06:15) (78 - 81)  BP: 136/80 (08 Aug 2018 06:15) (136/80 - 143/82)  BP(mean): --  RR: 18 (08 Aug 2018 06:15) (18 - 18)  SpO2: 94% (08 Aug 2018 06:15) (94% - 96%)    PHYSICAL EXAM:    General: Well developed; well nourished; in no acute distress  Eyes: PERRL, EOM intact; conjunctiva and sclera clear  Head: Normocephalic; atraumatic  ENMT: No nasal discharge; airway clear  Neck: Supple; non tender; no masses  Respiratory: No wheezes, rales or rhonchi  Cardiovascular: Regular rate and rhythm. S1 and S2 Normal; No murmurs, gallops or rubs  Gastrointestinal: Soft non-tender non-distended; Normal bowel sounds; No hepatosplenomegaly  Genitourinary: No costovertebral angle tenderness  Extremities: Normal range of motion, No clubbing, cyanosis or edema  Vascular: Peripheral pulses palpable 2+ bilaterally  Neurological: Alert and oriented x3  Skin: vesicular rash on left lower leg,upper back and chest and on left side of face  Lymph Nodes: No acute cervical adenopathy  Musculoskeletal: Normal gait, tone, without deformities    LABS:                        13.4   9.1   )-----------( 220      ( 08 Aug 2018 06:07 )             39.9     08-08    137  |  104  |  26<H>  ----------------------------<  123<H>  4.2   |  24  |  1.05    Ca    9.7      08 Aug 2018 06:05  Mg     2.2     08-08    TPro  7.4  /  Alb  4.0  /  TBili  0.4  /  DBili  x   /  AST  63<H>  /  ALT  105<H>  /  AlkPhos  147<H>  08-08    PT/INR - ( 08 Aug 2018 06:09 )   PT: 11.9 sec;   INR: 1.07          PTT - ( 08 Aug 2018 06:09 )  PTT:30.2 sec      RADIOLOGY & ADDITIONAL STUDIES:

## 2018-08-08 NOTE — H&P ADULT - HISTORY OF PRESENT ILLNESS
Patient is 62 year old s/p RLE knee arthroscopy with medial meniscal root repair inside out. For past 3 days patient has developed swelling of the RLE and rash on the anterior shin. Similar rash appeared on the face and its very painful. Patient has been sent in for cellulitis and IV antibiotics Patient is 62 year old s/p RLE knee arthroscopy with medial meniscal root repair inside out. Since 8/2 patient has developed swelling of the RLE and rash on the anterior shin. Patient took oral antibiotics (Keflex and Augmentin) for it without any improvement. Similar rash appeared on the face and its very painful only on the shin. Patient has been sent in for cellulitis and IV antibiotics

## 2018-08-08 NOTE — DISCHARGE NOTE ADULT - HOSPITAL COURSE
Admitted  IV antibiotics  RLE doppler  ID consult  Pain control  DVT ppx Admitted  IV ancef and acyclovir  Derm consult  ID consult  Pain control  DVT ppx  PT consult Admitted  IV ancef and acyclovir  Derm consult  ID consult  Pain control  DVT ppx  CT scan RLE  PT consult

## 2018-08-08 NOTE — ED ADULT TRIAGE NOTE - ARRIVAL INFO ADDITIONAL COMMENTS
Pt c/o right knee surgery infection. Pt states he had knee surgery on 7/1/18 and was put on abx due to infection. Pt c/o increased pain and sent in by ortho for IV abx. Pt c/o right knee surgery infection. Pt states he had knee surgery on 7/1/18 and was put on abx due to infection. Pt c/o increased pain and sent in by ortho for IV abx. Pt states max temp at home 100.5F. Pt has been taking Motrin and oxycodone for pain.

## 2018-08-08 NOTE — ED PROVIDER NOTE - DIAGNOSTIC INTERPRETATION
poor inspiration and within that context single view x ray notes no bony/ pulm/ cardiac overt acute abnormaility

## 2018-08-08 NOTE — ED ADULT NURSE NOTE - NSIMPLEMENTINTERV_GEN_ALL_ED
Implemented All Fall with Harm Risk Interventions:  [place] to call system. Call bell, personal items and telephone within reach. Instruct patient to call for assistance. Room bathroom lighting operational. Non-slip footwear when patient is off stretcher. Physically safe environment: no spills, clutter or unnecessary equipment. Stretcher in lowest position, wheels locked, appropriate side rails in place. Provide visual cue, wrist band, yellow gown, etc. Monitor gait and stability. Monitor for mental status changes and reorient to person, place, and time. Review medications for side effects contributing to fall risk. Reinforce activity limits and safety measures with patient and family. Provide visual clues: red socks.

## 2018-08-08 NOTE — H&P ADULT - ASSESSMENT
62 M s/p R Knee arthroscopic medial meniscal root repair    1. Admit to Ortho  2. Ancef per Dr. Salinas  3. SQH  4. Foot pumps  5. Pain control  6. IV fluids  7. ID consult  8. Regular Diet

## 2018-08-09 DIAGNOSIS — R21 RASH AND OTHER NONSPECIFIC SKIN ERUPTION: ICD-10-CM

## 2018-08-09 LAB
ANION GAP SERPL CALC-SCNC: 15 MMOL/L — SIGNIFICANT CHANGE UP (ref 5–17)
BUN SERPL-MCNC: 19 MG/DL — SIGNIFICANT CHANGE UP (ref 7–23)
CALCIUM SERPL-MCNC: 9.5 MG/DL — SIGNIFICANT CHANGE UP (ref 8.4–10.5)
CHLORIDE SERPL-SCNC: 99 MMOL/L — SIGNIFICANT CHANGE UP (ref 96–108)
CO2 SERPL-SCNC: 25 MMOL/L — SIGNIFICANT CHANGE UP (ref 22–31)
CREAT SERPL-MCNC: 1.1 MG/DL — SIGNIFICANT CHANGE UP (ref 0.5–1.3)
CRP SERPL-MCNC: 3.25 MG/DL — HIGH (ref 0–0.4)
CULTURE RESULTS: NO GROWTH — SIGNIFICANT CHANGE UP
ERYTHROCYTE [SEDIMENTATION RATE] IN BLOOD: 52 MM/HR — HIGH
GLUCOSE SERPL-MCNC: 109 MG/DL — HIGH (ref 70–99)
HCT VFR BLD CALC: 38.8 % — LOW (ref 39–50)
HGB BLD-MCNC: 12.9 G/DL — LOW (ref 13–17)
MCHC RBC-ENTMCNC: 28.8 PG — SIGNIFICANT CHANGE UP (ref 27–34)
MCHC RBC-ENTMCNC: 33.2 G/DL — SIGNIFICANT CHANGE UP (ref 32–36)
MCV RBC AUTO: 86.6 FL — SIGNIFICANT CHANGE UP (ref 80–100)
PLATELET # BLD AUTO: 248 K/UL — SIGNIFICANT CHANGE UP (ref 150–400)
POTASSIUM SERPL-MCNC: 4.5 MMOL/L — SIGNIFICANT CHANGE UP (ref 3.5–5.3)
POTASSIUM SERPL-SCNC: 4.5 MMOL/L — SIGNIFICANT CHANGE UP (ref 3.5–5.3)
RBC # BLD: 4.48 M/UL — SIGNIFICANT CHANGE UP (ref 4.2–5.8)
RBC # FLD: 12.1 % — SIGNIFICANT CHANGE UP (ref 10.3–16.9)
SODIUM SERPL-SCNC: 139 MMOL/L — SIGNIFICANT CHANGE UP (ref 135–145)
SPECIMEN SOURCE: SIGNIFICANT CHANGE UP
VZV IGM SER-ACNC: <0.91 INDEX — SIGNIFICANT CHANGE UP (ref 0–0.9)
WBC # BLD: 8.2 K/UL — SIGNIFICANT CHANGE UP (ref 3.8–10.5)
WBC # FLD AUTO: 8.2 K/UL — SIGNIFICANT CHANGE UP (ref 3.8–10.5)

## 2018-08-09 PROCEDURE — 73700 CT LOWER EXTREMITY W/O DYE: CPT | Mod: 26,RT

## 2018-08-09 RX ORDER — VALACYCLOVIR 500 MG/1
1000 TABLET, FILM COATED ORAL THREE TIMES A DAY
Qty: 0 | Refills: 0 | Status: DISCONTINUED | OUTPATIENT
Start: 2018-08-09 | End: 2018-08-10

## 2018-08-09 RX ADMIN — Medication 268 MILLIGRAM(S): at 13:50

## 2018-08-09 RX ADMIN — OXYCODONE HYDROCHLORIDE 10 MILLIGRAM(S): 5 TABLET ORAL at 23:39

## 2018-08-09 RX ADMIN — Medication 325 MILLIGRAM(S): at 06:11

## 2018-08-09 RX ADMIN — Medication 2 TABLET(S): at 13:51

## 2018-08-09 RX ADMIN — Medication 100 MILLIGRAM(S): at 21:35

## 2018-08-09 RX ADMIN — Medication 100 MILLIGRAM(S): at 13:51

## 2018-08-09 RX ADMIN — GABAPENTIN 300 MILLIGRAM(S): 400 CAPSULE ORAL at 06:11

## 2018-08-09 RX ADMIN — AMLODIPINE BESYLATE 2.5 MILLIGRAM(S): 2.5 TABLET ORAL at 06:11

## 2018-08-09 RX ADMIN — Medication 325 MILLIGRAM(S): at 17:16

## 2018-08-09 RX ADMIN — Medication 268 MILLIGRAM(S): at 06:11

## 2018-08-09 RX ADMIN — Medication 2 TABLET(S): at 06:11

## 2018-08-09 RX ADMIN — Medication 100 MILLIGRAM(S): at 06:11

## 2018-08-09 RX ADMIN — GABAPENTIN 300 MILLIGRAM(S): 400 CAPSULE ORAL at 17:15

## 2018-08-09 RX ADMIN — PANTOPRAZOLE SODIUM 40 MILLIGRAM(S): 20 TABLET, DELAYED RELEASE ORAL at 06:11

## 2018-08-09 RX ADMIN — OXYCODONE HYDROCHLORIDE 10 MILLIGRAM(S): 5 TABLET ORAL at 07:57

## 2018-08-09 RX ADMIN — Medication 2 TABLET(S): at 22:00

## 2018-08-09 RX ADMIN — VALACYCLOVIR 1000 MILLIGRAM(S): 500 TABLET, FILM COATED ORAL at 21:35

## 2018-08-09 RX ADMIN — OXYCODONE HYDROCHLORIDE 10 MILLIGRAM(S): 5 TABLET ORAL at 08:57

## 2018-08-09 NOTE — PHYSICAL THERAPY INITIAL EVALUATION ADULT - GENERAL OBSERVATIONS, REHAB EVAL
Pt received semi-supine in bed +heplock, +erythema R ochoa, +steri strips and stitches R knee, in NAD

## 2018-08-09 NOTE — PROGRESS NOTE ADULT - SUBJECTIVE AND OBJECTIVE BOX
ORTHO NOTE    [X ] Pt seen/examined.  [ ] Pt without any complaints/in NAD.    [X ] Pt complains of:  incisional pain controlled w/meds.  Rash is improving, still some tenderness of R shin.      ROS: [ ] Fever  [ ] Chills  [ ] CP [ ] SOB [ ] Dysnea  [ ] Palpitations [ ] Cough [ ] N/V/C/D [ ] Paresthia [ ] Other     [ X] ROS  otherwise negative    .    PHYSICAL EXAM:    Vital Signs Last 24 Hrs  T(C): 37.3 (09 Aug 2018 08:48), Max: 37.8 (08 Aug 2018 16:22)  T(F): 99.2 (09 Aug 2018 08:48), Max: 100 (08 Aug 2018 16:22)  HR: 80 (09 Aug 2018 08:48) (70 - 80)  BP: 136/80 (09 Aug 2018 08:48) (129/72 - 175/93)  BP(mean): --  RR: 16 (09 Aug 2018 08:48) (16 - 16)  SpO2: 98% (09 Aug 2018 08:48) (96% - 99%)    I&O's Detail    08 Aug 2018 07:01  -  09 Aug 2018 07:00  --------------------------------------------------------  IN:  Total IN: 0 mL    OUT:    Voided: 850 mL  Total OUT: 850 mL    Total NET: -850 mL           CAPILLARY BLOOD GLUCOSE                      Neuro:  NAD A and O  x  3    Lungs:    CV:    ABD:     Ext: RLE TA/GS/EHL/FHL 5/5 silt, R shin rash improved but still slightly ttp    Back rash improving    LABS                        12.9   8.2   )-----------( 248      ( 09 Aug 2018 11:11 )             38.8                              PT/INR - ( 08 Aug 2018 06:09 )   PT: 11.9 sec;   INR: 1.07          PTT - ( 08 Aug 2018 06:09 )  PTT:30.2 sec  08-09    139  |  99  |  19  ----------------------------<  109<H>  4.5   |  25  |  1.10    Ca    9.5      09 Aug 2018 11:11  Mg     2.2     08-08    TPro  7.4  /  Alb  4.0  /  TBili  0.4  /  DBili  x   /  AST  63<H>  /  ALT  105<H>  /  AlkPhos  147<H>  08-08      [ ] Other Labs  [ ] None ordered            Please check or [place] when present:  •  Heart Failure:    [ ] Acute        [ ]  Acute on Chronic        [ ] Chronic         [ ] Diastolic     [ ]  Combined    •  LANE:     [ ] ATN        [ ]  Renal medullary necrosis       [ ]  Renal cortical necrosis                  [ ] Other pathological Lesion:  •  CKD:  [ ] Stage I   [ ] Stage II  [ ] Stage III    [ ]Stage IV   [ ]  CKD V   [ ]  Other/Unspecified:    •  Abdominal Nutritional Status:   [ ] Malnutrition-See Nutrition note    [ ] Cachexia   [ ]  Other        [ ] Supplement ordered:            [ ] Morbid Obesity: BMI >=40         ASSESSMENT/PLAN:      STATUS POST: R medial meniscal root repair pod 9    CONTINUE:          [ ] PT nwb, can bend knee up to 70 degrees, KI at all other time    [ ] DVT PPX-  bid x 3 wk from surgery    [ ] Pain Mgt con't current regimen    [ ] Dispo plan- home    Dermatologist Dr Bennett thought back rash was due to folliculitis.  ID Dr Dietz is following.  Blood cx ngtd.  Varicella IgM wont be back for a week.  F/u R lower leg CT scan.  If negative, plan to just d/c on Valtrex 1 g TID x 7 days.  IS use.  Bowel regimen.

## 2018-08-09 NOTE — PHYSICAL THERAPY INITIAL EVALUATION ADULT - ADDITIONAL COMMENTS
Pt lives w/ wife in elevator access apt w/ no stairs to enter. States that prior to this admission he has been NWB RLE and using axillary crutches to ambulate. States that he went to 2 outpatient PT appointments before this admission.

## 2018-08-09 NOTE — PHYSICAL THERAPY INITIAL EVALUATION ADULT - MANUAL MUSCLE TESTING RESULTS, REHAB EVAL
grossly at least 3+/5 2/2 ability to perform functional mob against gravity, RLE N/A 2/2 WB precautions

## 2018-08-09 NOTE — PROGRESS NOTE ADULT - SUBJECTIVE AND OBJECTIVE BOX
INTERVAL HPI/OVERNIGHT EVENTS:    ANTIBIOTICS    MEDICATIONS  (STANDING):  acyclovir IVPB 900 milliGRAM(s) IV Intermittent every 8 hours  acyclovir IVPB      amLODIPine   Tablet 2.5 milliGRAM(s) Oral daily  aspirin 325 milliGRAM(s) Oral two times a day  calcium carbonate    500 mG (Tums) Chewable 2 Tablet(s) Chew every 8 hours  docusate sodium 100 milliGRAM(s) Oral three times a day  gabapentin 300 milliGRAM(s) Oral two times a day  magnesium hydroxide Suspension 30 milliLiter(s) Oral daily  pantoprazole    Tablet 40 milliGRAM(s) Oral before breakfast    MEDICATIONS  (PRN):  acetaminophen   Tablet 650 milliGRAM(s) Oral every 6 hours PRN For Temp greater than 38 C (100.4 F)  diphenhydrAMINE   Capsule 25 milliGRAM(s) Oral every 4 hours PRN Rash and/or Itching  ibuprofen  Tablet 400 milliGRAM(s) Oral two times a day PRN mild pain  morphine  - Injectable 2 milliGRAM(s) IV Push every 4 hours PRN Severe Pain (7 - 10)  ondansetron Injectable 4 milliGRAM(s) IV Push every 4 hours PRN Nausea and/or Vomiting  oxyCODONE    IR 10 milliGRAM(s) Oral every 4 hours PRN Severe Pain (7 - 10)  oxyCODONE    IR 5 milliGRAM(s) Oral every 4 hours PRN Moderate Pain (4 - 6)      Allergies    No Known Allergies    Intolerances        REVIEW OF SYSTEMS:    patient feeling better  Eyes: No eye pain, visual disturbances, or discharge  ENMT:  No difficulty hearing, tinnitus, vertigo; No sinus or throat pain  Neck: No pain or stiffness  Respiratory: No cough, wheezing, chills or hemoptysis  Cardiovascular: No chest pain, palpitations, shortness of breath, dizziness or leg swelling  Gastrointestinal: No abdominal or epigastric pain. No nausea, vomiting or hematemesis; No diarrhea or constipation. No melena or hematochezia.  Genitourinary: No dysuria, frequency, hematuria or incontinence  Rectal: No pain, hemorrhoids or incontinence  Neurological: No headaches, memory loss, loss of strength, numbness or tremors  Skin: rash is 40 percent better  Lymph Nodes: No enlarged glands  Endocrine: No heat or cold intolerance; No hair loss  Musculoskeletal: tenderness over right lower leg  Heme/Lymph: No easy bruising or bleeding gums  Allergy and Immunologic: No hives or eczema    Vital Signs Last 24 Hrs  T(C): 37.3 (09 Aug 2018 08:48), Max: 37.8 (08 Aug 2018 16:22)  T(F): 99.2 (09 Aug 2018 08:48), Max: 100 (08 Aug 2018 16:22)  HR: 80 (09 Aug 2018 08:48) (70 - 80)  BP: 136/80 (09 Aug 2018 08:48) (129/72 - 175/93)  BP(mean): --  RR: 16 (09 Aug 2018 08:48) (16 - 16)  SpO2: 98% (09 Aug 2018 08:48) (95% - 99%)    PHYSICAL EXAM:    General: Well developed; well nourished; in no acute distress  Eyes: PERRL, EOM intact; conjunctiva and sclera clear  Head: Normocephalic; atraumatic  ENMT: No nasal discharge; airway clear  Neck: Supple; non tender; no masses  Respiratory: No wheezes, rales or rhonchi  Cardiovascular: Regular rate and rhythm. S1 and S2 Normal; No murmurs, gallops or rubs  Gastrointestinal: Soft non-tender non-distended; Normal bowel sounds; No hepatosplenomegaly  Genitourinary: No costovertebral angle tenderness  Extremities: Normal range of motion, No clubbing, cyanosis or edema  Vascular: Peripheral pulses palpable 2+ bilaterally  Neurological: Alert and oriented x3  Skin: Warm and dry. rash decreases on leg ,back and face  Lymph Nodes: No acute cervical adenopathy  Musculoskeletal: tenderness over anterior aspect of right lower leg. Not red   not swollen   not  fluctuant  no calf tenderness    LABS:                        13.4   9.1   )-----------( 220      ( 08 Aug 2018 06:07 )             39.9     08-08    137  |  104  |  26<H>  ----------------------------<  123<H>  4.2   |  24  |  1.05    Ca    9.7      08 Aug 2018 06:05  Mg     2.2     08-08    TPro  7.4  /  Alb  4.0  /  TBili  0.4  /  DBili  x   /  AST  63<H>  /  ALT  105<H>  /  AlkPhos  147<H>  08-08    PT/INR - ( 08 Aug 2018 06:09 )   PT: 11.9 sec;   INR: 1.07          PTT - ( 08 Aug 2018 06:09 )  PTT:30.2 sec  Urinalysis Basic - ( 08 Aug 2018 17:15 )    Color: Yellow / Appearance: Clear / S.020 / pH: x  Gluc: x / Ketone: NEGATIVE  / Bili: Negative / Urobili: 0.2 E.U./dL   Blood: x / Protein: NEGATIVE mg/dL / Nitrite: NEGATIVE   Leuk Esterase: NEGATIVE / RBC: x / WBC x   Sq Epi: x / Non Sq Epi: x / Bacteria: x          MICROBIOLOGY:  Culture Results:   No growth at 12 hours ( @ 17:34)  Culture Results:   No growth at 12 hours ( @ 17:34)      RADIOLOGY & ADDITIONAL STUDIES:

## 2018-08-09 NOTE — PROGRESS NOTE ADULT - SUBJECTIVE AND OBJECTIVE BOX
SUBJECTIVE: Patient seen and examined. Pt doing well o/n.  No f/c/n/v/cp/sob.     Right Knee, Post-op Knee meniscal repair  POD #9, admitted on 2018    OBJECTIVE:  Vital Signs Last 24 Hrs  T(C): 36.8 (09 Aug 2018 06:13), Max: 37.8 (08 Aug 2018 16:22)  T(F): 98.2 (09 Aug 2018 06:13), Max: 100 (08 Aug 2018 16:22)  HR: 78 (09 Aug 2018 06:13) (70 - 79)  BP: 143/79 (09 Aug 2018 06:13) (129/72 - 175/93)  BP(mean): --  RR: 16 (09 Aug 2018 06:13) (16 - 16)  SpO2: 98% (09 Aug 2018 06:13) (95% - 99%)    Affected extremity:          Dressing: clean/dry/intact            Sensation: SILT         Motor exam: 5/5 TA/GS/EHL         warm well perfused; capillary refill <3 seconds     LABS:                        13.4   9.1   )-----------( 220      ( 08 Aug 2018 06:07 )             39.9     08-08    137  |  104  |  26<H>  ----------------------------<  123<H>  4.2   |  24  |  1.05    Ca    9.7      08 Aug 2018 06:05  Mg     2.2     08-08    TPro  7.4  /  Alb  4.0  /  TBili  0.4  /  DBili  x   /  AST  63<H>  /  ALT  105<H>  /  AlkPhos  147<H>  08-08    PT/INR - ( 08 Aug 2018 06:09 )   PT: 11.9 sec;   INR: 1.07          PTT - ( 08 Aug 2018 06:09 )  PTT:30.2 sec  Urinalysis Basic - ( 08 Aug 2018 17:15 )    Color: Yellow / Appearance: Clear / S.020 / pH: x  Gluc: x / Ketone: NEGATIVE  / Bili: Negative / Urobili: 0.2 E.U./dL   Blood: x / Protein: NEGATIVE mg/dL / Nitrite: NEGATIVE   Leuk Esterase: NEGATIVE / RBC: x / WBC x   Sq Epi: x / Non Sq Epi: x / Bacteria: x      MEDICATIONS:acetaminophen   Tablet 650 milliGRAM(s) Oral every 6 hours PRN  acyclovir IVPB 900 milliGRAM(s) IV Intermittent every 8 hours  acyclovir IVPB      amLODIPine   Tablet 2.5 milliGRAM(s) Oral daily  aspirin 325 milliGRAM(s) Oral two times a day  calcium carbonate    500 mG (Tums) Chewable 2 Tablet(s) Chew every 8 hours  diphenhydrAMINE   Capsule 25 milliGRAM(s) Oral every 4 hours PRN  docusate sodium 100 milliGRAM(s) Oral three times a day  gabapentin 300 milliGRAM(s) Oral two times a day  ibuprofen  Tablet 400 milliGRAM(s) Oral two times a day PRN  magnesium hydroxide Suspension 30 milliLiter(s) Oral daily  morphine  - Injectable 2 milliGRAM(s) IV Push every 4 hours PRN  ondansetron Injectable 4 milliGRAM(s) IV Push every 4 hours PRN  oxyCODONE    IR 10 milliGRAM(s) Oral every 4 hours PRN  oxyCODONE    IR 5 milliGRAM(s) Oral every 4 hours PRN  pantoprazole    Tablet 40 milliGRAM(s) Oral before breakfast    Anticoagulation:    Antibiotics:   acyclovir IVPB 900 milliGRAM(s) IV Intermittent every 8 hours  acyclovir IVPB        Pain medications:   acetaminophen   Tablet 650 milliGRAM(s) Oral every 6 hours PRN  aspirin 325 milliGRAM(s) Oral two times a day  diphenhydrAMINE   Capsule 25 milliGRAM(s) Oral every 4 hours PRN  gabapentin 300 milliGRAM(s) Oral two times a day  ibuprofen  Tablet 400 milliGRAM(s) Oral two times a day PRN  morphine  - Injectable 2 milliGRAM(s) IV Push every 4 hours PRN  ondansetron Injectable 4 milliGRAM(s) IV Push every 4 hours PRN  oxyCODONE    IR 10 milliGRAM(s) Oral every 4 hours PRN  oxyCODONE    IR 5 milliGRAM(s) Oral every 4 hours PRN    A/P :  Pt is a 61yo Male s/p  POD # 9 admitted on 2018  -   Follow ID advise for antiviral treatment  -    Pain control  -    DVT ppx: ASA     -    Weight bearing status: Non-WB with crutches  -    Physical Therapy  -    Dispo: Home

## 2018-08-09 NOTE — PHYSICAL THERAPY INITIAL EVALUATION ADULT - PERTINENT HX OF CURRENT PROBLEM, REHAB EVAL
Patient is 62 year old s/p RLE knee arthroscopy with medial meniscal root repair inside out. Since 8/2 patient has developed swelling of the RLE and rash on the anterior shin. Patient took oral antibiotics (Keflex and Augmentin) for it without any improvement. Similar rash appeared on the face and its very painful only on the shin. Patient has been sent in for cellulitis and IV antibiotics

## 2018-08-09 NOTE — PROGRESS NOTE ADULT - SUBJECTIVE AND OBJECTIVE BOX
Patient reports dramatic decreased pain.  Dr. Dietz concluded this to be viral, zoster.  Started anti viral meds.    PE    Knee no sign of infection . mild tenderness  distal tibia decreased erythema  no pain on passive motion    A/P    Patient is s/p meniscus root repair with viral episode.  Being treated by Dr. Dietz ( ID). When cleared to discharge will start PT outpatient and follow up in office.  Recommend PT in hospital in mean time.    LP

## 2018-08-09 NOTE — PHYSICAL THERAPY INITIAL EVALUATION ADULT - CRITERIA FOR SKILLED THERAPEUTIC INTERVENTIONS
rehab potential/anticipated discharge recommendation/therapy frequency/impairments found/risk reduction/prevention/functional limitations in following categories

## 2018-08-09 NOTE — PROGRESS NOTE ADULT - ASSESSMENT
A/P :  Pt is a 63yo Male s/p  POD # 9 admitted on 08-  -   Follow ID advise for antiviral treatment  -    Pain control  -    DVT ppx: ASA     -    Weight bearing status: Non-WB with crutches  -    Physical Therapy  -    Dispo: Home

## 2018-08-09 NOTE — PROGRESS NOTE ADULT - ASSESSMENT
rash much improved    differential diagnosis includes disseminated shingles, this would seem unlikely since the patient is not immunocompromised    this could be a nonspecific viral exanthem    a drug reaction to one of the antibiotics    a question has been raised if this is folliculitis. The vesicular nature of this rash and the dessiminated nature of this rash makes this less likely

## 2018-08-09 NOTE — PROGRESS NOTE ADULT - PROBLEM SELECTOR PLAN 1
Valtrex one gram PO TID for 7 days  Avoid oral antibiotics  CAT scan of leg to clarify etiology of right lower leg pain  patient to followup with his dermatologist

## 2018-08-10 VITALS
OXYGEN SATURATION: 98 % | TEMPERATURE: 96 F | DIASTOLIC BLOOD PRESSURE: 79 MMHG | RESPIRATION RATE: 16 BRPM | SYSTOLIC BLOOD PRESSURE: 134 MMHG | HEART RATE: 76 BPM

## 2018-08-10 LAB
ANION GAP SERPL CALC-SCNC: 10 MMOL/L — SIGNIFICANT CHANGE UP (ref 5–17)
BUN SERPL-MCNC: 19 MG/DL — SIGNIFICANT CHANGE UP (ref 7–23)
CALCIUM SERPL-MCNC: 9.9 MG/DL — SIGNIFICANT CHANGE UP (ref 8.4–10.5)
CHLORIDE SERPL-SCNC: 102 MMOL/L — SIGNIFICANT CHANGE UP (ref 96–108)
CO2 SERPL-SCNC: 27 MMOL/L — SIGNIFICANT CHANGE UP (ref 22–31)
CREAT SERPL-MCNC: 1.06 MG/DL — SIGNIFICANT CHANGE UP (ref 0.5–1.3)
CRP SERPL-MCNC: 3.78 MG/DL — HIGH (ref 0–0.4)
ERYTHROCYTE [SEDIMENTATION RATE] IN BLOOD: 56 MM/HR — HIGH
GLUCOSE SERPL-MCNC: 110 MG/DL — HIGH (ref 70–99)
HCT VFR BLD CALC: 38.6 % — LOW (ref 39–50)
HGB BLD-MCNC: 12.7 G/DL — LOW (ref 13–17)
MCHC RBC-ENTMCNC: 28.5 PG — SIGNIFICANT CHANGE UP (ref 27–34)
MCHC RBC-ENTMCNC: 32.9 G/DL — SIGNIFICANT CHANGE UP (ref 32–36)
MCV RBC AUTO: 86.5 FL — SIGNIFICANT CHANGE UP (ref 80–100)
PLATELET # BLD AUTO: 231 K/UL — SIGNIFICANT CHANGE UP (ref 150–400)
POTASSIUM SERPL-MCNC: 4.7 MMOL/L — SIGNIFICANT CHANGE UP (ref 3.5–5.3)
POTASSIUM SERPL-SCNC: 4.7 MMOL/L — SIGNIFICANT CHANGE UP (ref 3.5–5.3)
RBC # BLD: 4.46 M/UL — SIGNIFICANT CHANGE UP (ref 4.2–5.8)
RBC # FLD: 12.1 % — SIGNIFICANT CHANGE UP (ref 10.3–16.9)
SODIUM SERPL-SCNC: 139 MMOL/L — SIGNIFICANT CHANGE UP (ref 135–145)
WBC # BLD: 9.1 K/UL — SIGNIFICANT CHANGE UP (ref 3.8–10.5)
WBC # FLD AUTO: 9.1 K/UL — SIGNIFICANT CHANGE UP (ref 3.8–10.5)

## 2018-08-10 PROCEDURE — 73560 X-RAY EXAM OF KNEE 1 OR 2: CPT

## 2018-08-10 PROCEDURE — 80053 COMPREHEN METABOLIC PANEL: CPT

## 2018-08-10 PROCEDURE — 82550 ASSAY OF CK (CPK): CPT

## 2018-08-10 PROCEDURE — 99285 EMERGENCY DEPT VISIT HI MDM: CPT | Mod: 25

## 2018-08-10 PROCEDURE — 86140 C-REACTIVE PROTEIN: CPT

## 2018-08-10 PROCEDURE — 81003 URINALYSIS AUTO W/O SCOPE: CPT

## 2018-08-10 PROCEDURE — 73590 X-RAY EXAM OF LOWER LEG: CPT

## 2018-08-10 PROCEDURE — 85610 PROTHROMBIN TIME: CPT

## 2018-08-10 PROCEDURE — 97162 PT EVAL MOD COMPLEX 30 MIN: CPT

## 2018-08-10 PROCEDURE — 87040 BLOOD CULTURE FOR BACTERIA: CPT

## 2018-08-10 PROCEDURE — 85652 RBC SED RATE AUTOMATED: CPT

## 2018-08-10 PROCEDURE — 83690 ASSAY OF LIPASE: CPT

## 2018-08-10 PROCEDURE — 85730 THROMBOPLASTIN TIME PARTIAL: CPT

## 2018-08-10 PROCEDURE — 85025 COMPLETE CBC W/AUTO DIFF WBC: CPT

## 2018-08-10 PROCEDURE — 80048 BASIC METABOLIC PNL TOTAL CA: CPT

## 2018-08-10 PROCEDURE — 86787 VARICELLA-ZOSTER ANTIBODY: CPT

## 2018-08-10 PROCEDURE — 93005 ELECTROCARDIOGRAM TRACING: CPT

## 2018-08-10 PROCEDURE — 71045 X-RAY EXAM CHEST 1 VIEW: CPT

## 2018-08-10 PROCEDURE — 36415 COLL VENOUS BLD VENIPUNCTURE: CPT

## 2018-08-10 PROCEDURE — 73700 CT LOWER EXTREMITY W/O DYE: CPT

## 2018-08-10 PROCEDURE — 87086 URINE CULTURE/COLONY COUNT: CPT

## 2018-08-10 PROCEDURE — 85027 COMPLETE CBC AUTOMATED: CPT

## 2018-08-10 PROCEDURE — 83735 ASSAY OF MAGNESIUM: CPT

## 2018-08-10 RX ORDER — VALACYCLOVIR 500 MG/1
1 TABLET, FILM COATED ORAL
Qty: 15 | Refills: 0 | OUTPATIENT
Start: 2018-08-10 | End: 2018-08-14

## 2018-08-10 RX ORDER — IBUPROFEN 200 MG
1 TABLET ORAL
Qty: 0 | Refills: 0 | COMMUNITY
Start: 2018-08-10

## 2018-08-10 RX ORDER — AZTREONAM 2 G
1 VIAL (EA) INJECTION
Qty: 10 | Refills: 0 | OUTPATIENT
Start: 2018-08-10 | End: 2018-08-14

## 2018-08-10 RX ORDER — CLINDAMYCIN PHOSPHATE GEL USP, 1% 10 MG/G
1 GEL TOPICAL
Qty: 60 | Refills: 0 | OUTPATIENT
Start: 2018-08-10 | End: 2018-09-08

## 2018-08-10 RX ORDER — AMLODIPINE BESYLATE 2.5 MG/1
1 TABLET ORAL
Qty: 0 | Refills: 0 | COMMUNITY
Start: 2018-08-10

## 2018-08-10 RX ORDER — DOCUSATE SODIUM 100 MG
1 CAPSULE ORAL
Qty: 0 | Refills: 0 | COMMUNITY
Start: 2018-08-10

## 2018-08-10 RX ORDER — ASPIRIN/CALCIUM CARB/MAGNESIUM 324 MG
1 TABLET ORAL
Qty: 0 | Refills: 0 | COMMUNITY
Start: 2018-08-10

## 2018-08-10 RX ORDER — GABAPENTIN 400 MG/1
1 CAPSULE ORAL
Qty: 0 | Refills: 0 | COMMUNITY
Start: 2018-08-10

## 2018-08-10 RX ADMIN — MAGNESIUM HYDROXIDE 30 MILLILITER(S): 400 TABLET, CHEWABLE ORAL at 11:23

## 2018-08-10 RX ADMIN — AMLODIPINE BESYLATE 2.5 MILLIGRAM(S): 2.5 TABLET ORAL at 06:12

## 2018-08-10 RX ADMIN — PANTOPRAZOLE SODIUM 40 MILLIGRAM(S): 20 TABLET, DELAYED RELEASE ORAL at 06:12

## 2018-08-10 RX ADMIN — Medication 325 MILLIGRAM(S): at 06:12

## 2018-08-10 RX ADMIN — VALACYCLOVIR 1000 MILLIGRAM(S): 500 TABLET, FILM COATED ORAL at 06:12

## 2018-08-10 RX ADMIN — VALACYCLOVIR 1000 MILLIGRAM(S): 500 TABLET, FILM COATED ORAL at 14:13

## 2018-08-10 RX ADMIN — Medication 100 MILLIGRAM(S): at 06:12

## 2018-08-10 RX ADMIN — Medication 2 TABLET(S): at 06:13

## 2018-08-10 RX ADMIN — GABAPENTIN 300 MILLIGRAM(S): 400 CAPSULE ORAL at 06:12

## 2018-08-10 NOTE — PROGRESS NOTE ADULT - SUBJECTIVE AND OBJECTIVE BOX
SUBJECTIVE: Patient seen and examined. Pt doing well o/n.  No f/c/n/v/cp/sob.     Admitted on 2018, POD #10     OBJECTIVE:  Vital Signs Last 24 Hrs  T(C): 35.8 (10 Aug 2018 09:06), Max: 37.3 (09 Aug 2018 20:30)  T(F): 96.5 (10 Aug 2018 09:06), Max: 99.2 (09 Aug 2018 20:30)  HR: 76 (10 Aug 2018 09:06) (75 - 93)  BP: 134/79 (10 Aug 2018 09:06) (122/68 - 136/74)  BP(mean): --  RR: 16 (10 Aug 2018 09:06) (16 - 16)  SpO2: 98% (10 Aug 2018 09:) (94% - 98%)    Affected extremity: rash is better         Dressing: clean/dry/intact            Sensation: SILT         Motor exam: 5/5 TA/GS/EHL         warm well perfused; capillary refill <3 seconds     LABS:                        12.7   9.1   )-----------( 231      ( 10 Aug 2018 07:52 )             38.6     08-10    139  |  102  |  19  ----------------------------<  110<H>  4.7   |  27  |  1.06    Ca    9.9      10 Aug 2018 07:52        Urinalysis Basic - ( 08 Aug 2018 17:15 )    Color: Yellow / Appearance: Clear / S.020 / pH: x  Gluc: x / Ketone: NEGATIVE  / Bili: Negative / Urobili: 0.2 E.U./dL   Blood: x / Protein: NEGATIVE mg/dL / Nitrite: NEGATIVE   Leuk Esterase: NEGATIVE / RBC: x / WBC x   Sq Epi: x / Non Sq Epi: x / Bacteria: x      MEDICATIONS:acetaminophen   Tablet 650 milliGRAM(s) Oral every 6 hours PRN  amLODIPine   Tablet 2.5 milliGRAM(s) Oral daily  aspirin 325 milliGRAM(s) Oral two times a day  bisacodyl Suppository 10 milliGRAM(s) Rectal daily PRN  calcium carbonate    500 mG (Tums) Chewable 2 Tablet(s) Chew every 8 hours  diphenhydrAMINE   Capsule 25 milliGRAM(s) Oral every 4 hours PRN  docusate sodium 100 milliGRAM(s) Oral three times a day  gabapentin 300 milliGRAM(s) Oral two times a day  ibuprofen  Tablet 400 milliGRAM(s) Oral two times a day PRN  magnesium hydroxide Suspension 30 milliLiter(s) Oral daily  morphine  - Injectable 2 milliGRAM(s) IV Push every 4 hours PRN  ondansetron Injectable 4 milliGRAM(s) IV Push every 4 hours PRN  oxyCODONE    IR 10 milliGRAM(s) Oral every 4 hours PRN  oxyCODONE    IR 5 milliGRAM(s) Oral every 4 hours PRN  pantoprazole    Tablet 40 milliGRAM(s) Oral before breakfast  valACYclovir 1000 milliGRAM(s) Oral three times a day    Anticoagulation:    Antibiotics:   valACYclovir 1000 milliGRAM(s) Oral three times a day    Pain medications:   acetaminophen   Tablet 650 milliGRAM(s) Oral every 6 hours PRN  aspirin 325 milliGRAM(s) Oral two times a day  diphenhydrAMINE   Capsule 25 milliGRAM(s) Oral every 4 hours PRN  gabapentin 300 milliGRAM(s) Oral two times a day  ibuprofen  Tablet 400 milliGRAM(s) Oral two times a day PRN  morphine  - Injectable 2 milliGRAM(s) IV Push every 4 hours PRN  ondansetron Injectable 4 milliGRAM(s) IV Push every 4 hours PRN  oxyCODONE    IR 10 milliGRAM(s) Oral every 4 hours PRN  oxyCODONE    IR 5 milliGRAM(s) Oral every 4 hours PRN    A/P :  Pt is a 63yo Male s/p  Admission day #3 POD # 10  -    Antiviral and other meds as advised by ID  -    Pain control  -    DVT ppx: ASA     -    Weight bearing status: Non WB with crutches  -    Physical Therapy  -    Dispo: Home

## 2018-08-10 NOTE — PROGRESS NOTE ADULT - ASSESSMENT
A/P :  Pt is a 63yo Male s/p  Admission day #3 POD # 10  -    Antiviral and other meds as advised by ID  -    Pain control  -    DVT ppx: ASA     -    Weight bearing status: Non WB with crutches  -    Physical Therapy  -    Dispo: Home

## 2018-08-13 LAB
CULTURE RESULTS: SIGNIFICANT CHANGE UP
CULTURE RESULTS: SIGNIFICANT CHANGE UP
SPECIMEN SOURCE: SIGNIFICANT CHANGE UP
SPECIMEN SOURCE: SIGNIFICANT CHANGE UP

## 2018-08-14 ENCOUNTER — TELEPHONE (OUTPATIENT)
Dept: FAMILY MEDICINE CLINIC | Facility: CLINIC | Age: 63
End: 2018-08-14

## 2018-08-14 NOTE — TELEPHONE ENCOUNTER
Pt is calling to see what medication he was on back in March that he was allergic to. They had to change the prescription. He is working with an infectious disease program in [place] because he has moved. Please call back to advise.

## 2018-08-14 NOTE — TELEPHONE ENCOUNTER
Pt noticed of the two prescriptions written in March . Pt thinks it was the Guaifenesin-codeine he might have had a allergic reaction. Pt not sure.

## 2018-08-15 DIAGNOSIS — K59.00 CONSTIPATION, UNSPECIFIED: ICD-10-CM

## 2018-08-15 DIAGNOSIS — B01.89 OTHER VARICELLA COMPLICATIONS: ICD-10-CM

## 2018-08-15 DIAGNOSIS — I10 ESSENTIAL (PRIMARY) HYPERTENSION: ICD-10-CM

## 2018-08-15 DIAGNOSIS — L73.9 FOLLICULAR DISORDER, UNSPECIFIED: ICD-10-CM

## 2018-08-15 DIAGNOSIS — L03.115 CELLULITIS OF RIGHT LOWER LIMB: ICD-10-CM

## 2018-08-15 DIAGNOSIS — I45.10 UNSPECIFIED RIGHT BUNDLE-BRANCH BLOCK: ICD-10-CM

## 2018-08-15 DIAGNOSIS — R21 RASH AND OTHER NONSPECIFIC SKIN ERUPTION: ICD-10-CM

## 2018-08-15 DIAGNOSIS — M25.569 PAIN IN UNSPECIFIED KNEE: ICD-10-CM

## 2018-08-15 DIAGNOSIS — Z98.890 OTHER SPECIFIED POSTPROCEDURAL STATES: ICD-10-CM

## 2018-08-15 DIAGNOSIS — M79.606 PAIN IN LEG, UNSPECIFIED: ICD-10-CM

## 2018-08-15 DIAGNOSIS — B09 UNSPECIFIED VIRAL INFECTION CHARACTERIZED BY SKIN AND MUCOUS MEMBRANE LESIONS: ICD-10-CM

## 2020-09-04 ENCOUNTER — TELEPHONE (OUTPATIENT)
Dept: FAMILY MEDICINE CLINIC | Facility: CLINIC | Age: 65
End: 2020-09-04

## 2020-09-04 ENCOUNTER — HOSPITAL ENCOUNTER (OUTPATIENT)
Age: 65
Discharge: HOME | End: 2020-09-04
Payer: COMMERCIAL

## 2020-09-04 VITALS
OXYGEN SATURATION: 100 % | SYSTOLIC BLOOD PRESSURE: 147 MMHG | RESPIRATION RATE: 18 BRPM | TEMPERATURE: 99 F | HEART RATE: 65 BPM | DIASTOLIC BLOOD PRESSURE: 83 MMHG

## 2020-09-04 DIAGNOSIS — Z20.822 COVID-19 RULED OUT: Primary | ICD-10-CM

## 2020-09-04 PROCEDURE — 99213 OFFICE O/P EST LOW 20 MIN: CPT | Performed by: PHYSICIAN ASSISTANT

## 2020-09-04 NOTE — ED INITIAL ASSESSMENT (HPI)
C/o intermittent body aches, fatigue on & off for last 2 months. Symptoms started while in [place]. He had episode of diarrhea recently which resolved on its own. Spoke with PCP, who suggested Covid testing.

## 2020-09-04 NOTE — TELEPHONE ENCOUNTER
Called and talked to patient told him we have no openings today and he would need to be tested for covid gave him the testing sites, he will go to urgent care to be seen for the body aches

## 2020-09-04 NOTE — ED PROVIDER NOTES
Patient Seen in: [facility]      History   Patient presents with:  Pain    Stated Complaint: COVID TEST / BODY ACHES    HPI    CHIEF COMPLAINT: Body aches, fatigue, URI symptoms in the past couple days    HISTORY OF PRESENT patient/caregiver and is not pertinent to presenting problem. Social History    Tobacco Use      Smoking status: Never Smoker      Smokeless tobacco: Never Used    Alcohol use:  Yes      Alcohol/week: 0.0 standard drinks      Comment: 1 drink a week    D COVID-19 and sent to his primary care physician for ongoing testing. Patient was screened and evaluated during this visit.    I determined, within reasonable clinical confidence and prior to discharge, that an emergency medical condition was not or was no

## 2020-09-06 LAB — SARS-COV-2 RNA RESP QL NAA+PROBE: NOT DETECTED

## 2020-09-21 ENCOUNTER — TELEPHONE (OUTPATIENT)
Dept: FAMILY MEDICINE CLINIC | Facility: CLINIC | Age: 65
End: 2020-09-21

## 2020-09-21 DIAGNOSIS — Z13.0 SCREENING FOR ENDOCRINE, METABOLIC AND IMMUNITY DISORDER: ICD-10-CM

## 2020-09-21 DIAGNOSIS — Z00.00 BLOOD TESTS FOR ROUTINE GENERAL PHYSICAL EXAMINATION: Primary | ICD-10-CM

## 2020-09-21 DIAGNOSIS — Z13.29 SCREENING FOR THYROID DISORDER: ICD-10-CM

## 2020-09-21 DIAGNOSIS — I10 ESSENTIAL HYPERTENSION, BENIGN: ICD-10-CM

## 2020-09-21 DIAGNOSIS — Z13.220 SCREENING CHOLESTEROL LEVEL: ICD-10-CM

## 2020-09-21 DIAGNOSIS — Z01.812 ENCOUNTER FOR PREOPERATIVE SCREENING LABORATORY TESTING FOR COVID-19 VIRUS: ICD-10-CM

## 2020-09-21 DIAGNOSIS — Z12.5 SCREENING PSA (PROSTATE SPECIFIC ANTIGEN): ICD-10-CM

## 2020-09-21 DIAGNOSIS — Z13.228 SCREENING FOR ENDOCRINE, METABOLIC AND IMMUNITY DISORDER: ICD-10-CM

## 2020-09-21 DIAGNOSIS — Z13.29 SCREENING FOR ENDOCRINE, METABOLIC AND IMMUNITY DISORDER: ICD-10-CM

## 2020-09-21 DIAGNOSIS — Z12.5 PROSTATE CANCER SCREENING: ICD-10-CM

## 2020-09-21 DIAGNOSIS — Z13.0 SCREENING FOR DEFICIENCY ANEMIA: ICD-10-CM

## 2020-09-21 DIAGNOSIS — Z13.0 SCREENING, ANEMIA, DEFICIENCY, IRON: Primary | ICD-10-CM

## 2020-09-21 DIAGNOSIS — Z20.822 ENCOUNTER FOR PREOPERATIVE SCREENING LABORATORY TESTING FOR COVID-19 VIRUS: ICD-10-CM

## 2020-09-21 DIAGNOSIS — Z13.220 SCREENING, LIPID: ICD-10-CM

## 2020-09-21 NOTE — TELEPHONE ENCOUNTER
Please enter lab orders for the patient's upcoming physical appointment. Physical scheduled: 10/2/20     Preferred lab: [facility]     The patient has been notified to complete fasting labs prior to their physical appointment.

## 2020-09-21 NOTE — TELEPHONE ENCOUNTER
Pt requesting we send his labs ONLY for this time to [facility] instead of [facility] as he wants to to go tomorrow. Notify pt when done.

## 2020-09-21 NOTE — TELEPHONE ENCOUNTER
Pt requesting to have his labs drawn tomorrow at [facility]. Can you please add and notify pt  ([phone])

## 2020-09-22 ENCOUNTER — LAB ENCOUNTER (OUTPATIENT)
Dept: LAB | Facility: HOSPITAL | Age: 65
End: 2020-09-22
Attending: FAMILY MEDICINE
Payer: COMMERCIAL

## 2020-09-22 DIAGNOSIS — Z12.5 SCREENING PSA (PROSTATE SPECIFIC ANTIGEN): ICD-10-CM

## 2020-09-22 LAB
ALBUMIN SERPL-MCNC: 3.9 G/DL (ref 3.4–5)
ALBUMIN/GLOB SERPL: 1.2 {RATIO} (ref 1–2)
ALP LIVER SERPL-CCNC: 64 U/L
ALT SERPL-CCNC: 31 U/L
ANION GAP SERPL CALC-SCNC: 1 MMOL/L (ref 0–18)
AST SERPL-CCNC: 15 U/L (ref 15–37)
BASOPHILS # BLD AUTO: 0.04 X10(3) UL (ref 0–0.2)
BASOPHILS NFR BLD AUTO: 0.6 %
BILIRUB SERPL-MCNC: 0.5 MG/DL (ref 0.1–2)
BUN BLD-MCNC: 20 MG/DL (ref 7–18)
BUN/CREAT SERPL: 19.2 (ref 10–20)
CALCIUM BLD-MCNC: 9.1 MG/DL (ref 8.5–10.1)
CHLORIDE SERPL-SCNC: 107 MMOL/L (ref 98–112)
CHOLEST SMN-MCNC: 139 MG/DL (ref ?–200)
CO2 SERPL-SCNC: 31 MMOL/L (ref 21–32)
CREAT BLD-MCNC: 1.04 MG/DL
DEPRECATED RDW RBC AUTO: 40.1 FL (ref 35.1–46.3)
EOSINOPHIL # BLD AUTO: 0.13 X10(3) UL (ref 0–0.7)
EOSINOPHIL NFR BLD AUTO: 2.1 %
ERYTHROCYTE [DISTWIDTH] IN BLOOD BY AUTOMATED COUNT: 12.6 % (ref 11–15)
GLOBULIN PLAS-MCNC: 3.2 G/DL (ref 2.8–4.4)
GLUCOSE BLD-MCNC: 98 MG/DL (ref 70–99)
HCT VFR BLD AUTO: 42.2 %
HDLC SERPL-MCNC: 52 MG/DL (ref 40–59)
HGB BLD-MCNC: 14.1 G/DL
IMM GRANULOCYTES # BLD AUTO: 0.02 X10(3) UL (ref 0–1)
IMM GRANULOCYTES NFR BLD: 0.3 %
LDLC SERPL CALC-MCNC: 71 MG/DL (ref ?–100)
LYMPHOCYTES # BLD AUTO: 1.18 X10(3) UL (ref 1–4)
LYMPHOCYTES NFR BLD AUTO: 19 %
M PROTEIN MFR SERPL ELPH: 7.1 G/DL (ref 6.4–8.2)
MCH RBC QN AUTO: 29.1 PG (ref 26–34)
MCHC RBC AUTO-ENTMCNC: 33.4 G/DL (ref 31–37)
MCV RBC AUTO: 87.2 FL
MONOCYTES # BLD AUTO: 0.48 X10(3) UL (ref 0.1–1)
MONOCYTES NFR BLD AUTO: 7.7 %
NEUTROPHILS # BLD AUTO: 4.35 X10 (3) UL (ref 1.5–7.7)
NEUTROPHILS # BLD AUTO: 4.35 X10(3) UL (ref 1.5–7.7)
NEUTROPHILS NFR BLD AUTO: 70.3 %
NONHDLC SERPL-MCNC: 87 MG/DL (ref ?–130)
OSMOLALITY SERPL CALC.SUM OF ELEC: 291 MOSM/KG (ref 275–295)
PATIENT FASTING Y/N/NP: YES
PATIENT FASTING Y/N/NP: YES
PLATELET # BLD AUTO: 155 10(3)UL (ref 150–450)
POTASSIUM SERPL-SCNC: 4 MMOL/L (ref 3.5–5.1)
PSA SERPL-MCNC: 1.5 NG/ML (ref ?–4)
RBC # BLD AUTO: 4.84 X10(6)UL
SODIUM SERPL-SCNC: 139 MMOL/L (ref 136–145)
TRIGL SERPL-MCNC: 81 MG/DL (ref 30–149)
TSI SER-ACNC: 0.77 MIU/ML (ref 0.36–3.74)
VLDLC SERPL CALC-MCNC: 16 MG/DL (ref 0–30)
WBC # BLD AUTO: 6.2 X10(3) UL (ref 4–11)

## 2020-09-22 PROCEDURE — 84443 ASSAY THYROID STIM HORMONE: CPT | Performed by: FAMILY MEDICINE

## 2020-09-22 PROCEDURE — 85025 COMPLETE CBC W/AUTO DIFF WBC: CPT | Performed by: FAMILY MEDICINE

## 2020-09-22 PROCEDURE — 80053 COMPREHEN METABOLIC PANEL: CPT | Performed by: FAMILY MEDICINE

## 2020-09-22 PROCEDURE — 80061 LIPID PANEL: CPT | Performed by: FAMILY MEDICINE

## 2020-09-22 PROCEDURE — 84153 ASSAY OF PSA TOTAL: CPT | Performed by: FAMILY MEDICINE

## 2020-09-22 PROCEDURE — 36415 COLL VENOUS BLD VENIPUNCTURE: CPT | Performed by: FAMILY MEDICINE

## 2020-10-02 ENCOUNTER — OFFICE VISIT (OUTPATIENT)
Dept: FAMILY MEDICINE CLINIC | Facility: CLINIC | Age: 65
End: 2020-10-02
Payer: COMMERCIAL

## 2020-10-02 VITALS
DIASTOLIC BLOOD PRESSURE: 74 MMHG | RESPIRATION RATE: 16 BRPM | HEIGHT: 70.75 IN | TEMPERATURE: 98 F | BODY MASS INDEX: 28.28 KG/M2 | WEIGHT: 202 LBS | SYSTOLIC BLOOD PRESSURE: 140 MMHG | HEART RATE: 64 BPM

## 2020-10-02 DIAGNOSIS — Z23 NEED FOR VACCINATION: ICD-10-CM

## 2020-10-02 DIAGNOSIS — Z23 FLU VACCINE NEED: Primary | ICD-10-CM

## 2020-10-02 PROCEDURE — 3008F BODY MASS INDEX DOCD: CPT | Performed by: FAMILY MEDICINE

## 2020-10-02 PROCEDURE — 3077F SYST BP >= 140 MM HG: CPT | Performed by: FAMILY MEDICINE

## 2020-10-02 PROCEDURE — 90471 IMMUNIZATION ADMIN: CPT | Performed by: FAMILY MEDICINE

## 2020-10-02 PROCEDURE — 90662 IIV NO PRSV INCREASED AG IM: CPT | Performed by: FAMILY MEDICINE

## 2020-10-02 PROCEDURE — 99397 PER PM REEVAL EST PAT 65+ YR: CPT | Performed by: FAMILY MEDICINE

## 2020-10-02 PROCEDURE — 90472 IMMUNIZATION ADMIN EACH ADD: CPT | Performed by: FAMILY MEDICINE

## 2020-10-02 PROCEDURE — 3078F DIAST BP <80 MM HG: CPT | Performed by: FAMILY MEDICINE

## 2020-10-02 PROCEDURE — 90670 PCV13 VACCINE IM: CPT | Performed by: FAMILY MEDICINE

## 2020-10-02 RX ORDER — AMLODIPINE BESYLATE 5 MG/1
5 TABLET ORAL DAILY
Qty: 90 TABLET | Refills: 0 | Status: SHIPPED | OUTPATIENT
Start: 2020-10-02 | End: 2020-12-28

## 2020-10-02 NOTE — PROGRESS NOTES
:HPI:   Ry Dumont is a 76year old male who presents for a complete physical exam.     Patient has no complaints.     Wt Readings from Last 3 Encounters:  10/02/20 : 202 lb (91.6 kg)  03/14/18 : 213 lb (96.6 kg)  01/22/18 : 207 lb (93.9 kg)    [place] history  ALL/ASTHMA: denies hx of allergy or asthma    EXAM:   /74   Pulse 64   Temp 97.7 °F (36.5 °C) (Temporal)   Resp 16   Ht 70.75\"   Wt 202 lb (91.6 kg)   BMI 28.37 kg/m²   Body mass index is 28.37 kg/m².    GENERAL: well developed, well nourish Date/Time    T4F 1.3 04/11/2011 08:15 AM         Lipids  (most recent labs)   No results found for: A1C       ASSESSMENT:     Well exam    PLAN:     Ceola Paget was seen today for well adult.     Diagnoses and all orders for this visit:    Flu vaccine need  -

## 2020-12-28 ENCOUNTER — TELEPHONE (OUTPATIENT)
Dept: FAMILY MEDICINE CLINIC | Facility: CLINIC | Age: 65
End: 2020-12-28

## 2020-12-28 RX ORDER — AMLODIPINE BESYLATE 5 MG/1
TABLET ORAL
Qty: 90 TABLET | Refills: 0 | Status: SHIPPED | OUTPATIENT
Start: 2020-12-28 | End: 2021-07-09 | Stop reason: DRUGHIGH

## 2020-12-28 NOTE — TELEPHONE ENCOUNTER
Requested Prescriptions     Pending Prescriptions Disp Refills   • AMLODIPINE BESYLATE 5 MG Oral Tab [Pharmacy Med Name: AMLODIPINE BESYLATE 5MG TABLETS] 90 tablet 0     Sig: TAKE 1 TABLET(5 MG) BY MOUTH DAILY     LOV 10/2/2020     Patient was asked to fol

## 2020-12-28 NOTE — TELEPHONE ENCOUNTER
Pt informed of medication called in and an appt needed for is BP.  He will check his schedule and call back tomorrow

## 2020-12-31 ENCOUNTER — OFFICE VISIT (OUTPATIENT)
Dept: FAMILY MEDICINE CLINIC | Facility: CLINIC | Age: 65
End: 2020-12-31
Payer: COMMERCIAL

## 2020-12-31 VITALS
DIASTOLIC BLOOD PRESSURE: 70 MMHG | BODY MASS INDEX: 30.16 KG/M2 | HEART RATE: 68 BPM | TEMPERATURE: 98 F | SYSTOLIC BLOOD PRESSURE: 140 MMHG | WEIGHT: 213 LBS | HEIGHT: 70.5 IN | RESPIRATION RATE: 16 BRPM

## 2020-12-31 DIAGNOSIS — L57.0 AK (ACTINIC KERATOSIS): ICD-10-CM

## 2020-12-31 DIAGNOSIS — I10 ESSENTIAL HYPERTENSION, BENIGN: Primary | ICD-10-CM

## 2020-12-31 DIAGNOSIS — B07.8 OTHER VIRAL WARTS: ICD-10-CM

## 2020-12-31 PROCEDURE — 3078F DIAST BP <80 MM HG: CPT | Performed by: FAMILY MEDICINE

## 2020-12-31 PROCEDURE — 17000 DESTRUCT PREMALG LESION: CPT | Performed by: FAMILY MEDICINE

## 2020-12-31 PROCEDURE — 17110 DESTRUCTION B9 LES UP TO 14: CPT | Performed by: FAMILY MEDICINE

## 2020-12-31 PROCEDURE — 3077F SYST BP >= 140 MM HG: CPT | Performed by: FAMILY MEDICINE

## 2020-12-31 PROCEDURE — 99213 OFFICE O/P EST LOW 20 MIN: CPT | Performed by: FAMILY MEDICINE

## 2020-12-31 PROCEDURE — 3008F BODY MASS INDEX DOCD: CPT | Performed by: FAMILY MEDICINE

## 2020-12-31 RX ORDER — AMLODIPINE BESYLATE 5 MG/1
7.5 TABLET ORAL DAILY
Qty: 135 TABLET | Refills: 3 | Status: SHIPPED | OUTPATIENT
Start: 2020-12-31 | End: 2022-01-08

## 2020-12-31 NOTE — PROGRESS NOTES
Chief Complaint:  Patient presents with:  Blood Pressure: Follow Up    HPI:  This is a 76year old male patient presenting for Blood Pressure (Follow Up)    Skin lesions to R hand and L ear. Rough patch to L ear. Using neosporin.  Improved some.      HTN - TempSrc: Temporal   Weight: 213 lb (96.6 kg)   Height: 5' 10.5\" (1.791 m)     GENERAL: vitals reviewed and listed above, alert, oriented, appears well hydrated and in no acute distress  HEENT: atraumatic, conjunctiva clear, no obvious abnormalities on i

## 2021-01-15 ENCOUNTER — OFFICE VISIT (OUTPATIENT)
Dept: FAMILY MEDICINE CLINIC | Facility: CLINIC | Age: 66
End: 2021-01-15
Payer: COMMERCIAL

## 2021-01-15 VITALS
BODY MASS INDEX: 29.3 KG/M2 | DIASTOLIC BLOOD PRESSURE: 70 MMHG | WEIGHT: 207 LBS | RESPIRATION RATE: 14 BRPM | TEMPERATURE: 98 F | OXYGEN SATURATION: 96 % | HEIGHT: 70.5 IN | SYSTOLIC BLOOD PRESSURE: 124 MMHG | HEART RATE: 68 BPM

## 2021-01-15 DIAGNOSIS — B07.8 OTHER VIRAL WARTS: ICD-10-CM

## 2021-01-15 DIAGNOSIS — L57.0 AK (ACTINIC KERATOSIS): ICD-10-CM

## 2021-01-15 DIAGNOSIS — Z23 NEED FOR VACCINATION: ICD-10-CM

## 2021-01-15 DIAGNOSIS — I10 ESSENTIAL HYPERTENSION, BENIGN: Primary | ICD-10-CM

## 2021-01-15 PROCEDURE — 90750 HZV VACC RECOMBINANT IM: CPT | Performed by: FAMILY MEDICINE

## 2021-01-15 PROCEDURE — 99213 OFFICE O/P EST LOW 20 MIN: CPT | Performed by: FAMILY MEDICINE

## 2021-01-15 PROCEDURE — 3008F BODY MASS INDEX DOCD: CPT | Performed by: FAMILY MEDICINE

## 2021-01-15 PROCEDURE — 3078F DIAST BP <80 MM HG: CPT | Performed by: FAMILY MEDICINE

## 2021-01-15 PROCEDURE — 3074F SYST BP LT 130 MM HG: CPT | Performed by: FAMILY MEDICINE

## 2021-01-15 PROCEDURE — 90471 IMMUNIZATION ADMIN: CPT | Performed by: FAMILY MEDICINE

## 2021-01-15 NOTE — PROGRESS NOTES
Chief Complaint:  Patient presents with:  Blood Pressure:  Follow Up  Immunization/Injection: 1st dose of Shingles Shot    HPI:  This is a 76year old male patient presenting for Blood Pressure (Follow Up) and Immunization/Injection (1st dose of Shingles Sh TAKE 1 TABLET(5 MG) BY MOUTH DAILY 90 tablet 0     Allergies:  No Known Allergies    EXAM:   01/15/21  0955   BP: 124/70   Pulse: 68   Resp: 14   Temp: 97.6 °F (36.4 °C)   TempSrc: Temporal   SpO2: 96%   Weight: 207 lb (93.9 kg)   Height: 5' 10.5\" (1.791

## 2021-03-08 ENCOUNTER — PATIENT MESSAGE (OUTPATIENT)
Dept: FAMILY MEDICINE CLINIC | Facility: CLINIC | Age: 66
End: 2021-03-08

## 2021-03-08 NOTE — TELEPHONE ENCOUNTER
Pt received 2nd covid vaccine and c/o body aches and congestion. Advised rest, fluids, Tylenol. Usual side effects to vaccine and usually hast 3-7 days.

## 2021-03-08 NOTE — TELEPHONE ENCOUNTER
From: Bekah Batch  To: Jack Meeks DO  Sent: 3/8/2021 8:56 AM CST  Subject: Non-Urgent Medical Question    I received my second Pfizer vaccine yesterday and have flu like symptoms today I have congestion and body aches and pains. . Can I treat t

## 2021-04-17 DIAGNOSIS — Z00.00 ROUTINE GENERAL MEDICAL EXAMINATION AT A HEALTH CARE FACILITY: ICD-10-CM

## 2021-04-17 NOTE — TELEPHONE ENCOUNTER
Pended medicatoin for approval from [facility]

## 2021-04-17 NOTE — TELEPHONE ENCOUNTER
Pt requesting a refill on Famciclovir. Pt is requesting medication be sent to [facility] in [facility]. Pt has 4 tablets left of medication. [address]

## 2021-04-19 RX ORDER — FAMCICLOVIR 500 MG/1
500 TABLET, FILM COATED ORAL 3 TIMES DAILY
Qty: 42 TABLET | Refills: 5 | Status: SHIPPED | OUTPATIENT
Start: 2021-04-19 | End: 2021-08-30

## 2021-05-11 NOTE — PROGRESS NOTES
Pt. notified for test results. Instructed on tx plan and follow-up care. [Follow-Up] : a follow-up visit for [Patient] : patient [Family Member] : family member

## 2021-06-28 ENCOUNTER — TELEPHONE (OUTPATIENT)
Dept: FAMILY MEDICINE CLINIC | Facility: CLINIC | Age: 66
End: 2021-06-28

## 2021-06-28 DIAGNOSIS — R04.0 RECURRENT EPISTAXIS: Primary | ICD-10-CM

## 2021-06-28 NOTE — TELEPHONE ENCOUNTER
Try Afrin spray, but this would be an ENT thing    BP controlled. Occurs after exercise. Called and discussed with patient.

## 2021-06-28 NOTE — TELEPHONE ENCOUNTER
For about 4 weeks has been having bleeding from his right nostril and he gets it to stop but thinks that he may need to be seen for this.

## 2021-07-02 ENCOUNTER — NURSE ONLY (OUTPATIENT)
Dept: FAMILY MEDICINE CLINIC | Facility: CLINIC | Age: 66
End: 2021-07-02
Payer: COMMERCIAL

## 2021-07-02 VITALS — TEMPERATURE: 97 F

## 2021-07-02 DIAGNOSIS — Z23 NEED FOR SHINGLES VACCINE: Primary | ICD-10-CM

## 2021-07-02 PROCEDURE — 90750 HZV VACC RECOMBINANT IM: CPT | Performed by: FAMILY MEDICINE

## 2021-07-02 PROCEDURE — 90471 IMMUNIZATION ADMIN: CPT | Performed by: FAMILY MEDICINE

## 2021-07-02 NOTE — PROGRESS NOTES
Patient is here today for his second dose of  Shingrix. He has no complaints. He is given the VIS. He has signed the waiver. The Shingrix was ordered by Dr. Annamarie Matos M.D. The Shingrix is given in the left deltoid.

## 2021-07-09 ENCOUNTER — OFFICE VISIT (OUTPATIENT)
Dept: FAMILY MEDICINE CLINIC | Facility: CLINIC | Age: 66
End: 2021-07-09
Payer: COMMERCIAL

## 2021-07-09 VITALS
WEIGHT: 201.38 LBS | HEIGHT: 70.5 IN | SYSTOLIC BLOOD PRESSURE: 128 MMHG | HEART RATE: 56 BPM | RESPIRATION RATE: 16 BRPM | DIASTOLIC BLOOD PRESSURE: 64 MMHG | BODY MASS INDEX: 28.51 KG/M2 | TEMPERATURE: 98 F

## 2021-07-09 DIAGNOSIS — M43.6 NECK STIFFNESS: Primary | ICD-10-CM

## 2021-07-09 PROCEDURE — 3078F DIAST BP <80 MM HG: CPT | Performed by: PHYSICIAN ASSISTANT

## 2021-07-09 PROCEDURE — 99213 OFFICE O/P EST LOW 20 MIN: CPT | Performed by: PHYSICIAN ASSISTANT

## 2021-07-09 PROCEDURE — 3008F BODY MASS INDEX DOCD: CPT | Performed by: PHYSICIAN ASSISTANT

## 2021-07-09 PROCEDURE — 3074F SYST BP LT 130 MM HG: CPT | Performed by: PHYSICIAN ASSISTANT

## 2021-07-09 RX ORDER — CYCLOBENZAPRINE HCL 5 MG
5 TABLET ORAL 3 TIMES DAILY PRN
Qty: 20 TABLET | Refills: 0 | Status: SHIPPED | OUTPATIENT
Start: 2021-07-09 | End: 2021-08-30

## 2021-07-09 RX ORDER — METHYLPREDNISOLONE 4 MG/1
TABLET ORAL
Qty: 21 EACH | Refills: 0 | Status: SHIPPED | OUTPATIENT
Start: 2021-07-09 | End: 2021-08-30

## 2021-07-13 NOTE — PROGRESS NOTES
Patient presents with:  Neck Pain: R Stiff neck for 24 hours after detailing car on Wednesday       [address] [facility]  Martin Samson is a 76year old male who presents for right neck pain.  Notes that he was detailing his car for a few hours Height: 5' 10.5\" (1.791 m)        PHYSICAL EXAM  GENERAL:  Alert and in no acute distress.  Well groomed and appropriately dressed. SKIN:  No rashes. MUSCULOSKELETAL:    Spine- No midline spinal tenderness or step-offs. Very limited ROM to right.  Mild

## 2021-08-06 ENCOUNTER — TELEPHONE (OUTPATIENT)
Dept: FAMILY MEDICINE CLINIC | Facility: CLINIC | Age: 66
End: 2021-08-06

## 2021-08-06 NOTE — TELEPHONE ENCOUNTER
[place] [address]      Called and talked to patient given contact information for ENT

## 2021-08-06 NOTE — TELEPHONE ENCOUNTER
Patient had spoken with Dr. Kim Maryland 06/28/21 about reoccurring nose bleeds which he manages to get under control but was advised he may need to see an ENT, patient looking for Dr. Sabrina Miranda recommendation.  Please advise

## 2021-08-27 ENCOUNTER — TELEPHONE (OUTPATIENT)
Dept: FAMILY MEDICINE CLINIC | Facility: CLINIC | Age: 66
End: 2021-08-27

## 2021-08-27 NOTE — TELEPHONE ENCOUNTER
Pt has seen a ENT for blood noses and they have cauterized it a couple times and they wanted him to get his blood pressure checked he didn't know if he needed a visit or just a nurse visit for a blood pressure check.

## 2021-08-30 ENCOUNTER — OFFICE VISIT (OUTPATIENT)
Dept: FAMILY MEDICINE CLINIC | Facility: CLINIC | Age: 66
End: 2021-08-30
Payer: COMMERCIAL

## 2021-08-30 VITALS
BODY MASS INDEX: 28.88 KG/M2 | HEIGHT: 70.5 IN | WEIGHT: 204 LBS | DIASTOLIC BLOOD PRESSURE: 70 MMHG | RESPIRATION RATE: 16 BRPM | TEMPERATURE: 98 F | SYSTOLIC BLOOD PRESSURE: 120 MMHG | HEART RATE: 64 BPM

## 2021-08-30 DIAGNOSIS — R04.0 EPISTAXIS: ICD-10-CM

## 2021-08-30 DIAGNOSIS — I10 ESSENTIAL HYPERTENSION: Primary | ICD-10-CM

## 2021-08-30 PROCEDURE — 3074F SYST BP LT 130 MM HG: CPT | Performed by: NURSE PRACTITIONER

## 2021-08-30 PROCEDURE — 3078F DIAST BP <80 MM HG: CPT | Performed by: NURSE PRACTITIONER

## 2021-08-30 PROCEDURE — 99213 OFFICE O/P EST LOW 20 MIN: CPT | Performed by: NURSE PRACTITIONER

## 2021-08-30 PROCEDURE — 3008F BODY MASS INDEX DOCD: CPT | Performed by: NURSE PRACTITIONER

## 2021-08-30 NOTE — PROGRESS NOTES
Patient presents with:  Blood Pressure: would like bp checked has been getting nose bleeds       HPI:  Presents with approx 2.5-3 month history of intermittent epistaxis occurring randomly, typically at rest. Stated always occurs from right nare.  Has seen readings stable in office today and consistently stable in office readings here looking back to Epic. Discussed reported home readings are not high enough to explain epistaxis. Continue current BP management.      Epistaxis- will defer management to ENT but

## 2022-01-04 NOTE — PATIENT PROFILE ADULT. - FUNCTIONAL SCREEN CURRENT LEVEL: TOILETING, MLM
Important Medication Changes:none    Further testing to be done:CMP for monitoring of Kidney and liver function with anticoagulation therapy    Next follow up visit: In office in 6 Months    HERE IS SOME IMPORTANT INFORMATION FOR OUR PATIENTS    If you need refills- call your pharmacist and they will contact our office.    If you have medical concerns call    [phone]. ([facility])                                                           [phone]. ([place])                                                                         If you have a question during office hours, or need to make an appointment call [phone] ([place]) or [phone]([place]). You can consider signing up for [employer], our popular online communication portal to schedule an appointment, ask for a refill, see your results, or message the staff about your concerns. The nurses will get back to you as soon as they can. Please allow 24-48 hours if it's an emergency call the office to discuss your symptoms with a triage nurse.    Go to: [email or web address]           (3) assistive equipment and person

## 2022-01-07 DIAGNOSIS — I10 ESSENTIAL HYPERTENSION, BENIGN: ICD-10-CM

## 2022-01-08 ENCOUNTER — TELEPHONE (OUTPATIENT)
Dept: FAMILY MEDICINE CLINIC | Facility: CLINIC | Age: 67
End: 2022-01-08

## 2022-01-08 DIAGNOSIS — I10 ESSENTIAL HYPERTENSION, BENIGN: Primary | ICD-10-CM

## 2022-01-08 DIAGNOSIS — Z13.220 LIPID SCREENING: ICD-10-CM

## 2022-01-08 DIAGNOSIS — Z12.5 PROSTATE CANCER SCREENING: ICD-10-CM

## 2022-01-08 DIAGNOSIS — E55.9 VITAMIN D DEFICIENCY: ICD-10-CM

## 2022-01-08 DIAGNOSIS — I10 ESSENTIAL HYPERTENSION, BENIGN: ICD-10-CM

## 2022-01-08 RX ORDER — AMLODIPINE BESYLATE 5 MG/1
7.5 TABLET ORAL DAILY
Qty: 45 TABLET | Refills: 0 | Status: SHIPPED | OUTPATIENT
Start: 2022-01-08 | End: 2022-02-07

## 2022-01-08 NOTE — TELEPHONE ENCOUNTER
Patient has a physical set up for [email or web address]  He would like his   amLODIPine Besylate 5 MG Oral Tab () 135 tablet   Refilled until this appointment if possible

## 2022-01-08 NOTE — TELEPHONE ENCOUNTER
Please enter lab orders for the patient's upcoming physical appointment. Physical scheduled:    Your appointments     Date & Time Appointment Department [facility])    Jan 31, 2022  3:30 PM CST Physical - Established with Acosta Sim MD [address] [place] [address]

## 2022-01-10 RX ORDER — AMLODIPINE BESYLATE 5 MG/1
TABLET ORAL
Qty: 135 TABLET | Refills: 3 | OUTPATIENT
Start: 2022-01-10

## 2022-02-07 RX ORDER — AMLODIPINE BESYLATE 5 MG/1
TABLET ORAL
Qty: 45 TABLET | Refills: 0 | Status: SHIPPED | OUTPATIENT
Start: 2022-02-07 | End: 2022-03-07

## 2022-03-07 ENCOUNTER — TELEPHONE (OUTPATIENT)
Dept: FAMILY MEDICINE CLINIC | Facility: CLINIC | Age: 67
End: 2022-03-07

## 2022-03-07 RX ORDER — AMLODIPINE BESYLATE 5 MG/1
7.5 TABLET ORAL DAILY
Qty: 45 TABLET | Refills: 0 | Status: SHIPPED | OUTPATIENT
Start: 2022-03-07 | End: 2022-03-29

## 2022-03-07 NOTE — TELEPHONE ENCOUNTER
Please enter lab orders for the patient's upcoming physical appointment. Physical scheduled: Your appointments     Date & Time Appointment Department [facility])    Mar 29, 2022  9:00 AM CDT Physical - Established with Bee Webb MD [facility], [address], Gita Barba  (Demetris Beckham)            [address] Dilan Gupta [address] [facility] [place] [phone]         Preferred lab: [facility]     The patient has been notified to complete fasting labs prior to their physical appointment.

## 2022-03-26 ENCOUNTER — PATIENT MESSAGE (OUTPATIENT)
Dept: FAMILY MEDICINE CLINIC | Facility: CLINIC | Age: 67
End: 2022-03-26

## 2022-03-26 ENCOUNTER — LAB ENCOUNTER (OUTPATIENT)
Dept: LAB | Facility: HOSPITAL | Age: 67
End: 2022-03-26
Attending: FAMILY MEDICINE
Payer: COMMERCIAL

## 2022-03-26 DIAGNOSIS — Z12.5 PROSTATE CANCER SCREENING: ICD-10-CM

## 2022-03-26 DIAGNOSIS — Z13.220 LIPID SCREENING: Primary | ICD-10-CM

## 2022-03-26 LAB
ALBUMIN SERPL-MCNC: 4.4 G/DL (ref 3.4–5)
ALBUMIN/GLOB SERPL: 1.4 {RATIO} (ref 1–2)
ALP LIVER SERPL-CCNC: 67 U/L
ALT SERPL-CCNC: 27 U/L
ANION GAP SERPL CALC-SCNC: 5 MMOL/L (ref 0–18)
AST SERPL-CCNC: 18 U/L (ref 15–37)
BILIRUB SERPL-MCNC: 0.8 MG/DL (ref 0.1–2)
CALCIUM BLD-MCNC: 9.9 MG/DL (ref 8.5–10.1)
CHLORIDE SERPL-SCNC: 103 MMOL/L (ref 98–112)
CHOLEST SERPL-MCNC: 138 MG/DL (ref ?–200)
CO2 SERPL-SCNC: 30 MMOL/L (ref 21–32)
CREAT BLD-MCNC: 0.99 MG/DL
GLOBULIN PLAS-MCNC: 3.1 G/DL (ref 2.8–4.4)
GLUCOSE BLD-MCNC: 87 MG/DL (ref 70–99)
HDLC SERPL-MCNC: 47 MG/DL (ref 40–59)
LDLC SERPL CALC-MCNC: 79 MG/DL (ref ?–100)
NONHDLC SERPL-MCNC: 91 MG/DL (ref ?–130)
OSMOLALITY SERPL CALC.SUM OF ELEC: 288 MOSM/KG (ref 275–295)
POTASSIUM SERPL-SCNC: 4.2 MMOL/L (ref 3.5–5.1)
PROT SERPL-MCNC: 7.5 G/DL (ref 6.4–8.2)
PSA SERPL-MCNC: 2.06 NG/ML (ref ?–4)
SODIUM SERPL-SCNC: 138 MMOL/L (ref 136–145)
TRIGL SERPL-MCNC: 54 MG/DL (ref 30–149)
VIT D+METAB SERPL-MCNC: 79.3 NG/ML (ref 30–100)
VLDLC SERPL CALC-MCNC: 8 MG/DL (ref 0–30)

## 2022-03-26 PROCEDURE — 36415 COLL VENOUS BLD VENIPUNCTURE: CPT | Performed by: FAMILY MEDICINE

## 2022-03-26 PROCEDURE — 80053 COMPREHEN METABOLIC PANEL: CPT | Performed by: FAMILY MEDICINE

## 2022-03-26 PROCEDURE — 82306 VITAMIN D 25 HYDROXY: CPT | Performed by: FAMILY MEDICINE

## 2022-03-26 PROCEDURE — 84153 ASSAY OF PSA TOTAL: CPT

## 2022-03-26 PROCEDURE — 80061 LIPID PANEL: CPT

## 2022-03-28 NOTE — TELEPHONE ENCOUNTER
From: Landen Bullard  To: Mohsen Oliveira MD  Sent: 3/26/2022 3:31 PM CDT  Subject: Question regarding COMP METABOLIC PANEL (14)    Please disregard previous question

## 2022-03-28 NOTE — TELEPHONE ENCOUNTER
From: Eliazar Johns  To: Adilene Robertson MD  Sent: 3/26/2022 3:18 PM CDT  Subject: Question regarding LIPID PANEL    What is my cholesterol ratio count

## 2022-03-29 ENCOUNTER — OFFICE VISIT (OUTPATIENT)
Dept: FAMILY MEDICINE CLINIC | Facility: CLINIC | Age: 67
End: 2022-03-29
Payer: COMMERCIAL

## 2022-03-29 VITALS
DIASTOLIC BLOOD PRESSURE: 70 MMHG | SYSTOLIC BLOOD PRESSURE: 126 MMHG | HEIGHT: 71 IN | BODY MASS INDEX: 26.6 KG/M2 | RESPIRATION RATE: 16 BRPM | WEIGHT: 190 LBS | OXYGEN SATURATION: 98 % | TEMPERATURE: 98 F | HEART RATE: 60 BPM

## 2022-03-29 DIAGNOSIS — E55.9 VITAMIN D DEFICIENCY: ICD-10-CM

## 2022-03-29 DIAGNOSIS — I10 ESSENTIAL HYPERTENSION, BENIGN: ICD-10-CM

## 2022-03-29 DIAGNOSIS — Z00.00 ANNUAL PHYSICAL EXAM: Primary | ICD-10-CM

## 2022-03-29 PROCEDURE — 3074F SYST BP LT 130 MM HG: CPT | Performed by: FAMILY MEDICINE

## 2022-03-29 PROCEDURE — 99397 PER PM REEVAL EST PAT 65+ YR: CPT | Performed by: FAMILY MEDICINE

## 2022-03-29 PROCEDURE — 3008F BODY MASS INDEX DOCD: CPT | Performed by: FAMILY MEDICINE

## 2022-03-29 PROCEDURE — 3078F DIAST BP <80 MM HG: CPT | Performed by: FAMILY MEDICINE

## 2022-03-29 RX ORDER — AMLODIPINE BESYLATE 5 MG/1
5 TABLET ORAL DAILY
Qty: 90 TABLET | Refills: 3 | Status: SHIPPED | OUTPATIENT
Start: 2022-03-29

## 2022-04-14 ENCOUNTER — OFFICE VISIT (OUTPATIENT)
Dept: FAMILY MEDICINE CLINIC | Facility: CLINIC | Age: 67
End: 2022-04-14
Payer: COMMERCIAL

## 2022-04-14 VITALS
DIASTOLIC BLOOD PRESSURE: 86 MMHG | TEMPERATURE: 98 F | SYSTOLIC BLOOD PRESSURE: 161 MMHG | OXYGEN SATURATION: 97 % | BODY MASS INDEX: 27 KG/M2 | HEIGHT: 71 IN | HEART RATE: 65 BPM | RESPIRATION RATE: 18 BRPM

## 2022-04-14 DIAGNOSIS — M54.50 ACUTE LEFT-SIDED LOW BACK PAIN WITHOUT SCIATICA: ICD-10-CM

## 2022-04-14 DIAGNOSIS — K12.2 UVULITIS: ICD-10-CM

## 2022-04-14 DIAGNOSIS — Z87.442 HISTORY OF KIDNEY STONES: ICD-10-CM

## 2022-04-14 DIAGNOSIS — U07.1 COVID-19: Primary | ICD-10-CM

## 2022-04-14 LAB
APPEARANCE: CLEAR
BILIRUBIN: NEGATIVE
CONTROL LINE PRESENT WITH A CLEAR BACKGROUND (YES/NO): YES YES/NO
GLUCOSE (URINE DIPSTICK): NEGATIVE MG/DL
KETONES (URINE DIPSTICK): NEGATIVE MG/DL
KIT LOT #: NORMAL NUMERIC
LEUKOCYTES: NEGATIVE
MULTISTIX LOT#: NORMAL NUMERIC
NITRITE, URINE: NEGATIVE
OCCULT BLOOD: NEGATIVE
PH, URINE: 7 (ref 4.5–8)
PROTEIN (URINE DIPSTICK): NEGATIVE MG/DL
SPECIFIC GRAVITY: 1.02 (ref 1–1.03)
STREP GRP A CUL-SCR: NEGATIVE
URINE-COLOR: YELLOW
UROBILINOGEN,SEMI-QN: 0.2 MG/DL (ref 0–1.9)

## 2022-04-14 PROCEDURE — 99214 OFFICE O/P EST MOD 30 MIN: CPT | Performed by: NURSE PRACTITIONER

## 2022-04-14 PROCEDURE — 81003 URINALYSIS AUTO W/O SCOPE: CPT | Performed by: NURSE PRACTITIONER

## 2022-04-14 PROCEDURE — 3079F DIAST BP 80-89 MM HG: CPT | Performed by: NURSE PRACTITIONER

## 2022-04-14 PROCEDURE — 3077F SYST BP >= 140 MM HG: CPT | Performed by: NURSE PRACTITIONER

## 2022-04-14 PROCEDURE — 87880 STREP A ASSAY W/OPTIC: CPT | Performed by: NURSE PRACTITIONER

## 2022-04-14 PROCEDURE — U0002 COVID-19 LAB TEST NON-CDC: HCPCS | Performed by: NURSE PRACTITIONER

## 2022-04-14 RX ORDER — PREDNISONE 20 MG/1
40 TABLET ORAL DAILY
Qty: 10 TABLET | Refills: 0 | Status: SHIPPED | OUTPATIENT
Start: 2022-04-14 | End: 2022-04-19

## 2022-04-14 RX ORDER — CYCLOBENZAPRINE HCL 10 MG
10 TABLET ORAL 3 TIMES DAILY PRN
Qty: 45 TABLET | Refills: 0 | Status: SHIPPED | OUTPATIENT
Start: 2022-04-14 | End: 2022-04-29

## 2022-04-14 RX ORDER — BENZONATATE 200 MG/1
200 CAPSULE ORAL 3 TIMES DAILY PRN
Qty: 20 CAPSULE | Refills: 0 | Status: SHIPPED | OUTPATIENT
Start: 2022-04-14 | End: 2022-04-21

## 2022-04-15 LAB
OPERATOR ID: ABNORMAL
POCT LOT NUMBER: ABNORMAL
RAPID SARS-COV-2 BY PCR: DETECTED

## 2022-06-13 NOTE — PHYSICAL THERAPY INITIAL EVALUATION ADULT - SIT-TO-STAND BALANCE
good balance Closure 2 Information: This tab is for additional flaps and grafts, including complex repair and grafts and complex repair and flaps. You can also specify a different location for the additional defect, if the location is the same you do not need to select a new one. We will insert the automated text for the repair you select below just as we do for solitary flaps and grafts. Please note that at this time if you select a location with a different insurance zone you will need to override the ICD10 and CPT if appropriate.

## 2022-06-20 ENCOUNTER — ANCILLARY ORDERS (OUTPATIENT)
Dept: FAMILY MEDICINE CLINIC | Facility: CLINIC | Age: 67
End: 2022-06-20

## 2022-06-20 ENCOUNTER — OFFICE VISIT (OUTPATIENT)
Dept: FAMILY MEDICINE CLINIC | Facility: CLINIC | Age: 67
End: 2022-06-20
Payer: COMMERCIAL

## 2022-06-20 ENCOUNTER — HOSPITAL ENCOUNTER (OUTPATIENT)
Dept: GENERAL RADIOLOGY | Facility: HOSPITAL | Age: 67
Discharge: HOME | End: 2022-06-20
Attending: FAMILY MEDICINE
Payer: COMMERCIAL

## 2022-06-20 VITALS
OXYGEN SATURATION: 98 % | RESPIRATION RATE: 16 BRPM | TEMPERATURE: 99 F | SYSTOLIC BLOOD PRESSURE: 132 MMHG | WEIGHT: 196 LBS | HEIGHT: 71 IN | HEART RATE: 65 BPM | BODY MASS INDEX: 27.44 KG/M2 | DIASTOLIC BLOOD PRESSURE: 72 MMHG

## 2022-06-20 DIAGNOSIS — M25.551 RIGHT HIP PAIN: Primary | ICD-10-CM

## 2022-06-20 DIAGNOSIS — M25.551 RIGHT HIP PAIN: ICD-10-CM

## 2022-06-20 PROCEDURE — 73502 X-RAY EXAM HIP UNI 2-3 VIEWS: CPT | Performed by: FAMILY MEDICINE

## 2022-06-20 PROCEDURE — 3078F DIAST BP <80 MM HG: CPT | Performed by: FAMILY MEDICINE

## 2022-06-20 PROCEDURE — 3075F SYST BP GE 130 - 139MM HG: CPT | Performed by: FAMILY MEDICINE

## 2022-06-20 PROCEDURE — 3008F BODY MASS INDEX DOCD: CPT | Performed by: FAMILY MEDICINE

## 2022-06-20 PROCEDURE — 99213 OFFICE O/P EST LOW 20 MIN: CPT | Performed by: FAMILY MEDICINE

## 2022-09-08 DIAGNOSIS — Z00.00 ROUTINE GENERAL MEDICAL EXAMINATION AT A HEALTH CARE FACILITY: ICD-10-CM

## 2022-09-08 RX ORDER — FAMCICLOVIR 500 MG/1
500 TABLET, FILM COATED ORAL 3 TIMES DAILY
Qty: 42 TABLET | Refills: 5 | Status: SHIPPED | OUTPATIENT
Start: 2022-09-08

## 2022-09-08 NOTE — TELEPHONE ENCOUNTER
Patient is calling requesting a refill on Famciclovir 500 MG Oral Tab.    Pt is out of medication requesting medication to go to:    [place] [id number] [facility], [address] [place] [address] AT [address] , [phone], [phone]    Pt requested a call back for status on medication

## 2022-09-20 ENCOUNTER — OFFICE VISIT (OUTPATIENT)
Dept: FAMILY MEDICINE CLINIC | Facility: CLINIC | Age: 67
End: 2022-09-20

## 2022-09-20 VITALS
RESPIRATION RATE: 16 BRPM | SYSTOLIC BLOOD PRESSURE: 147 MMHG | HEART RATE: 65 BPM | HEIGHT: 71 IN | DIASTOLIC BLOOD PRESSURE: 79 MMHG | TEMPERATURE: 97 F | BODY MASS INDEX: 26.6 KG/M2 | WEIGHT: 190 LBS | OXYGEN SATURATION: 99 %

## 2022-09-20 DIAGNOSIS — B34.9 VIRAL ILLNESS: Primary | ICD-10-CM

## 2022-09-20 PROCEDURE — 3078F DIAST BP <80 MM HG: CPT | Performed by: NURSE PRACTITIONER

## 2022-09-20 PROCEDURE — 99213 OFFICE O/P EST LOW 20 MIN: CPT | Performed by: NURSE PRACTITIONER

## 2022-09-20 PROCEDURE — 3077F SYST BP >= 140 MM HG: CPT | Performed by: NURSE PRACTITIONER

## 2022-09-20 PROCEDURE — 3008F BODY MASS INDEX DOCD: CPT | Performed by: NURSE PRACTITIONER

## 2022-09-21 LAB — SARS-COV-2 RNA RESP QL NAA+PROBE: NOT DETECTED

## 2022-09-22 ENCOUNTER — OFFICE VISIT (OUTPATIENT)
Dept: FAMILY MEDICINE CLINIC | Facility: CLINIC | Age: 67
End: 2022-09-22

## 2022-09-22 VITALS
OXYGEN SATURATION: 97 % | TEMPERATURE: 97 F | RESPIRATION RATE: 16 BRPM | WEIGHT: 195 LBS | BODY MASS INDEX: 25.84 KG/M2 | HEIGHT: 73 IN | DIASTOLIC BLOOD PRESSURE: 73 MMHG | SYSTOLIC BLOOD PRESSURE: 140 MMHG | HEART RATE: 75 BPM

## 2022-09-22 DIAGNOSIS — J06.9 VIRAL URI: ICD-10-CM

## 2022-09-22 DIAGNOSIS — J02.9 SORE THROAT: Primary | ICD-10-CM

## 2022-09-22 LAB
CONTROL LINE PRESENT WITH A CLEAR BACKGROUND (YES/NO): YES YES/NO
KIT LOT #: NORMAL NUMERIC
STREP GRP A CUL-SCR: NEGATIVE

## 2022-09-22 PROCEDURE — 3078F DIAST BP <80 MM HG: CPT

## 2022-09-22 PROCEDURE — 87081 CULTURE SCREEN ONLY: CPT

## 2022-09-22 PROCEDURE — 3008F BODY MASS INDEX DOCD: CPT

## 2022-09-22 PROCEDURE — 3077F SYST BP >= 140 MM HG: CPT

## 2022-09-22 PROCEDURE — 99213 OFFICE O/P EST LOW 20 MIN: CPT

## 2022-09-22 PROCEDURE — 87880 STREP A ASSAY W/OPTIC: CPT

## 2022-09-22 RX ORDER — LIDOCAINE HYDROCHLORIDE 20 MG/ML
5 SOLUTION OROPHARYNGEAL
Qty: 100 ML | Refills: 0 | Status: SHIPPED | OUTPATIENT
Start: 2022-09-22 | End: 2022-09-29

## 2022-09-26 ENCOUNTER — LAB ENCOUNTER (OUTPATIENT)
Dept: LAB | Age: 67
End: 2022-09-26

## 2022-09-26 DIAGNOSIS — J02.9 ACUTE PHARYNGITIS: Primary | ICD-10-CM

## 2022-09-26 DIAGNOSIS — J02.9 ACUTE PHARYNGITIS: ICD-10-CM

## 2022-09-27 ENCOUNTER — OFFICE VISIT (OUTPATIENT)
Dept: FAMILY MEDICINE CLINIC | Facility: CLINIC | Age: 67
End: 2022-09-27

## 2022-09-27 VITALS
SYSTOLIC BLOOD PRESSURE: 126 MMHG | RESPIRATION RATE: 16 BRPM | HEART RATE: 64 BPM | BODY MASS INDEX: 27.2 KG/M2 | DIASTOLIC BLOOD PRESSURE: 64 MMHG | TEMPERATURE: 98 F | HEIGHT: 72 IN | WEIGHT: 200.81 LBS

## 2022-09-27 DIAGNOSIS — J32.9 SINOBRONCHITIS: Primary | ICD-10-CM

## 2022-09-27 DIAGNOSIS — J40 SINOBRONCHITIS: Primary | ICD-10-CM

## 2022-09-27 LAB — SARS-COV-2 RNA RESP QL NAA+PROBE: NOT DETECTED

## 2022-09-27 PROCEDURE — 3074F SYST BP LT 130 MM HG: CPT | Performed by: NURSE PRACTITIONER

## 2022-09-27 PROCEDURE — 99214 OFFICE O/P EST MOD 30 MIN: CPT | Performed by: NURSE PRACTITIONER

## 2022-09-27 PROCEDURE — 3078F DIAST BP <80 MM HG: CPT | Performed by: NURSE PRACTITIONER

## 2022-09-27 PROCEDURE — 3008F BODY MASS INDEX DOCD: CPT | Performed by: NURSE PRACTITIONER

## 2022-09-27 RX ORDER — AMOXICILLIN 875 MG/1
875 TABLET, COATED ORAL 2 TIMES DAILY
Qty: 20 TABLET | Refills: 0 | Status: SHIPPED | OUTPATIENT
Start: 2022-09-27 | End: 2022-10-07

## 2022-09-27 RX ORDER — PREDNISONE 20 MG/1
20 TABLET ORAL 2 TIMES DAILY
Qty: 10 TABLET | Refills: 0 | Status: SHIPPED | OUTPATIENT
Start: 2022-09-27 | End: 2022-10-02

## 2022-09-27 NOTE — PATIENT INSTRUCTIONS
Rest, increase fluids, salt water gargles ,adan pot (use distilled water) or saline nasal spray, Advil cold and sinus (behind the counter) in the am  Nyquil at night   Tylenol, Halls, vicks vapo rub,  follow up if symptoms persist or increase.

## 2022-12-30 ENCOUNTER — TELEPHONE (OUTPATIENT)
Dept: FAMILY MEDICINE CLINIC | Facility: CLINIC | Age: 67
End: 2022-12-30

## 2022-12-30 NOTE — TELEPHONE ENCOUNTER
Pt is calling he only wants to see Dr. Shonda Mejia he is experiencing High /85, 150/80 150/90 and he is getting sick every few months,  always congested in all climates. Patient only wants to see Dr. Shonda Mejia but doesn't want to wait until 2/13/23.

## 2023-01-04 NOTE — TELEPHONE ENCOUNTER
LM to inform pt to call back and set up appt to see Dr. Juan Carlos Stringer for elevated BP.  Ok to use SDA

## 2023-01-06 ENCOUNTER — OFFICE VISIT (OUTPATIENT)
Dept: FAMILY MEDICINE CLINIC | Facility: CLINIC | Age: 68
End: 2023-01-06
Payer: COMMERCIAL

## 2023-01-06 VITALS
TEMPERATURE: 99 F | HEIGHT: 72 IN | BODY MASS INDEX: 28.31 KG/M2 | HEART RATE: 68 BPM | WEIGHT: 209 LBS | OXYGEN SATURATION: 98 % | RESPIRATION RATE: 16 BRPM | SYSTOLIC BLOOD PRESSURE: 132 MMHG | DIASTOLIC BLOOD PRESSURE: 70 MMHG

## 2023-01-06 DIAGNOSIS — I10 ESSENTIAL HYPERTENSION: ICD-10-CM

## 2023-01-06 DIAGNOSIS — J32.9 CHRONIC CONGESTION OF PARANASAL SINUS: Primary | ICD-10-CM

## 2023-01-06 PROCEDURE — 3008F BODY MASS INDEX DOCD: CPT | Performed by: FAMILY MEDICINE

## 2023-01-06 PROCEDURE — 3075F SYST BP GE 130 - 139MM HG: CPT | Performed by: FAMILY MEDICINE

## 2023-01-06 PROCEDURE — 3078F DIAST BP <80 MM HG: CPT | Performed by: FAMILY MEDICINE

## 2023-01-06 PROCEDURE — 99213 OFFICE O/P EST LOW 20 MIN: CPT | Performed by: FAMILY MEDICINE

## 2023-01-06 RX ORDER — FLUTICASONE PROPIONATE 50 MCG
2 SPRAY, SUSPENSION (ML) NASAL DAILY
Qty: 16 G | Refills: 2 | Status: SHIPPED | OUTPATIENT
Start: 2023-01-06 | End: 2024-01-01

## 2023-01-12 ENCOUNTER — OFFICE VISIT (OUTPATIENT)
Facility: LOCATION | Age: 68
End: 2023-01-12
Payer: COMMERCIAL

## 2023-01-12 DIAGNOSIS — J32.8 OTHER CHRONIC SINUSITIS: Primary | ICD-10-CM

## 2023-01-12 PROCEDURE — 99203 OFFICE O/P NEW LOW 30 MIN: CPT | Performed by: OTOLARYNGOLOGY

## 2023-01-12 PROCEDURE — 77011 CT SCAN FOR LOCALIZATION: CPT | Performed by: OTOLARYNGOLOGY

## 2023-01-12 RX ORDER — CLINDAMYCIN HYDROCHLORIDE 300 MG/1
300 CAPSULE ORAL 3 TIMES DAILY
Qty: 30 CAPSULE | Refills: 1 | Status: SHIPPED | OUTPATIENT
Start: 2023-01-12

## 2023-01-12 RX ORDER — PREDNISONE 20 MG/1
TABLET ORAL
Qty: 18 TABLET | Refills: 1 | Status: SHIPPED | OUTPATIENT
Start: 2023-01-12

## 2023-04-13 DIAGNOSIS — I10 ESSENTIAL HYPERTENSION, BENIGN: ICD-10-CM

## 2023-04-14 RX ORDER — AMLODIPINE BESYLATE 5 MG/1
TABLET ORAL
Qty: 90 TABLET | Refills: 3 | Status: SHIPPED | OUTPATIENT
Start: 2023-04-14

## 2023-05-01 ENCOUNTER — OFFICE VISIT (OUTPATIENT)
Dept: FAMILY MEDICINE CLINIC | Facility: CLINIC | Age: 68
End: 2023-05-01
Payer: COMMERCIAL

## 2023-05-01 VITALS
BODY MASS INDEX: 28.14 KG/M2 | HEIGHT: 71 IN | RESPIRATION RATE: 15 BRPM | HEART RATE: 80 BPM | DIASTOLIC BLOOD PRESSURE: 64 MMHG | SYSTOLIC BLOOD PRESSURE: 132 MMHG | WEIGHT: 201 LBS

## 2023-05-01 DIAGNOSIS — M54.41 ACUTE BILATERAL LOW BACK PAIN WITH RIGHT-SIDED SCIATICA: ICD-10-CM

## 2023-05-01 DIAGNOSIS — H93.13 TINNITUS OF BOTH EARS: Primary | ICD-10-CM

## 2023-05-01 DIAGNOSIS — H61.21 CERUMEN DEBRIS ON TYMPANIC MEMBRANE OF RIGHT EAR: ICD-10-CM

## 2023-05-01 PROCEDURE — 3008F BODY MASS INDEX DOCD: CPT | Performed by: NURSE PRACTITIONER

## 2023-05-01 PROCEDURE — 3078F DIAST BP <80 MM HG: CPT | Performed by: NURSE PRACTITIONER

## 2023-05-01 PROCEDURE — 99213 OFFICE O/P EST LOW 20 MIN: CPT | Performed by: NURSE PRACTITIONER

## 2023-05-01 PROCEDURE — 3075F SYST BP GE 130 - 139MM HG: CPT | Performed by: NURSE PRACTITIONER

## 2023-05-01 RX ORDER — PREDNISONE 20 MG/1
40 TABLET ORAL DAILY
Qty: 10 TABLET | Refills: 0 | Status: SHIPPED | OUTPATIENT
Start: 2023-05-01

## 2023-05-01 RX ORDER — CYCLOBENZAPRINE HCL 5 MG
5 TABLET ORAL NIGHTLY PRN
Qty: 10 TABLET | Refills: 0 | Status: SHIPPED | OUTPATIENT
Start: 2023-05-01

## 2023-05-01 NOTE — PATIENT INSTRUCTIONS
Take prednisone, 2 tabs, at the same time, daily for 5 days. Take in the morning with breakfast. Do not take Advil, Motril, ibuprofen products or Aleve while taking this medication. It is ok to take acetaminophen (Tylenol) while taking this medication. If you have regular strength tylenol you may take 3 tabs up to 3 times daily. If you have extra-strength tylenol you may take 2 tabs up to 3 times daily. Take one cyclobenzaprine tab nightly, before bed, for three (3) nights. Then may take nightly as needed. This medication will likely make you drowsy. Do not drive, operate machinery or make important decisions after taking this medication. Do not drink alcohol while taking this medication. Apply warm compress or heating pad to back 3-4 times daily for 15-20 minutes each time. Go to physical therapy as directed.

## 2023-05-17 ENCOUNTER — HOSPITAL ENCOUNTER (OUTPATIENT)
Dept: GENERAL RADIOLOGY | Age: 68
Discharge: HOME | End: 2023-05-17
Attending: NURSE PRACTITIONER
Payer: COMMERCIAL

## 2023-05-17 ENCOUNTER — OFFICE VISIT (OUTPATIENT)
Dept: FAMILY MEDICINE CLINIC | Facility: CLINIC | Age: 68
End: 2023-05-17
Payer: COMMERCIAL

## 2023-05-17 VITALS
WEIGHT: 204.19 LBS | DIASTOLIC BLOOD PRESSURE: 80 MMHG | TEMPERATURE: 97 F | HEIGHT: 71 IN | SYSTOLIC BLOOD PRESSURE: 130 MMHG | BODY MASS INDEX: 28.59 KG/M2 | HEART RATE: 88 BPM | OXYGEN SATURATION: 98 % | RESPIRATION RATE: 16 BRPM

## 2023-05-17 DIAGNOSIS — M54.41 ACUTE BILATERAL LOW BACK PAIN WITH RIGHT-SIDED SCIATICA: Primary | ICD-10-CM

## 2023-05-17 DIAGNOSIS — M54.41 ACUTE BILATERAL LOW BACK PAIN WITH RIGHT-SIDED SCIATICA: ICD-10-CM

## 2023-05-17 DIAGNOSIS — I10 ESSENTIAL HYPERTENSION, BENIGN: ICD-10-CM

## 2023-05-17 PROCEDURE — 3075F SYST BP GE 130 - 139MM HG: CPT | Performed by: NURSE PRACTITIONER

## 2023-05-17 PROCEDURE — 99213 OFFICE O/P EST LOW 20 MIN: CPT | Performed by: NURSE PRACTITIONER

## 2023-05-17 PROCEDURE — 3079F DIAST BP 80-89 MM HG: CPT | Performed by: NURSE PRACTITIONER

## 2023-05-17 PROCEDURE — 72110 X-RAY EXAM L-2 SPINE 4/>VWS: CPT | Performed by: NURSE PRACTITIONER

## 2023-05-17 PROCEDURE — 3008F BODY MASS INDEX DOCD: CPT | Performed by: NURSE PRACTITIONER

## 2023-05-17 RX ORDER — CYCLOBENZAPRINE HCL 5 MG
5 TABLET ORAL NIGHTLY PRN
Qty: 10 TABLET | Refills: 0 | Status: SHIPPED | OUTPATIENT
Start: 2023-05-17 | End: 2023-05-27

## 2023-05-17 RX ORDER — NAPROXEN 500 MG/1
500 TABLET ORAL 2 TIMES DAILY WITH MEALS
Qty: 14 TABLET | Refills: 0 | Status: SHIPPED | OUTPATIENT
Start: 2023-05-17 | End: 2023-05-24

## 2023-05-17 RX ORDER — AMLODIPINE BESYLATE 5 MG/1
5 TABLET ORAL DAILY
Qty: 90 TABLET | Refills: 1 | Status: SHIPPED | OUTPATIENT
Start: 2023-05-17

## 2023-05-17 RX ORDER — NAPROXEN 500 MG/1
500 TABLET ORAL 2 TIMES DAILY WITH MEALS
Qty: 10 TABLET | Refills: 0 | Status: SHIPPED | OUTPATIENT
Start: 2023-05-17 | End: 2023-05-17

## 2023-05-17 NOTE — PATIENT INSTRUCTIONS
Take Naproxen, one tab twice daily until all tabs are gone. Space doses 12 hours apart for best effect. TAKE DOSES WITH FOOD. Do not take any Advil, Motrin, Aleve or ibuprofen products while taking this medication. It is ok to take acetaminophen (Tylenol) while taking this medication. If you have regular strength tylenol you may take 3 tabs up to 3 times daily. If you have extra-strength tylenol you may take 2 tabs up to 3 times daily. Take one cyclobenzaprine tab nightly, before bed, for three (3) nights. Then may take nightly as needed. This medication will likely make you drowsy. Do not drive, operate machinery or make important decisions after taking this medication. Do not drink alcohol while taking this medication. Apply warm compress or heating pad to back 3-4 times daily for 15-20 minutes each time. Go to physical therapy as directed.

## 2023-05-18 ENCOUNTER — OFFICE VISIT (OUTPATIENT)
Dept: SURGERY | Facility: CLINIC | Age: 68
End: 2023-05-18
Payer: COMMERCIAL

## 2023-05-18 VITALS
BODY MASS INDEX: 28 KG/M2 | HEART RATE: 90 BPM | SYSTOLIC BLOOD PRESSURE: 140 MMHG | HEIGHT: 71 IN | WEIGHT: 200 LBS | DIASTOLIC BLOOD PRESSURE: 80 MMHG

## 2023-05-18 DIAGNOSIS — R29.898 WEAKNESS OF RIGHT LOWER EXTREMITY: ICD-10-CM

## 2023-05-18 DIAGNOSIS — M54.16 LUMBAR RADICULOPATHY: Primary | ICD-10-CM

## 2023-05-18 PROCEDURE — 3079F DIAST BP 80-89 MM HG: CPT | Performed by: PHYSICIAN ASSISTANT

## 2023-05-18 PROCEDURE — 3077F SYST BP >= 140 MM HG: CPT | Performed by: PHYSICIAN ASSISTANT

## 2023-05-18 PROCEDURE — 99213 OFFICE O/P EST LOW 20 MIN: CPT | Performed by: PHYSICIAN ASSISTANT

## 2023-05-18 PROCEDURE — 3008F BODY MASS INDEX DOCD: CPT | Performed by: PHYSICIAN ASSISTANT

## 2023-05-18 NOTE — PROGRESS NOTES
New patient:  Reason for visit: lower back pain     Estimated time of onset:  Year(s) lasted six weeks has been getting worst     Numeric Rating Scale: (No Pain) 0  to  10 (Worst Pain)        Pain at Present:  4/10                                                                                                                       Distribution of Pain:    right    Past Treatments for Current Pain Condition:   NSAIDS and Other injections 5 years ago they helped him steroids   Response to treatment: some relief    Prior diagnostic testing related to this condition:        XR lumbar spine 5.17.23

## 2023-05-31 ENCOUNTER — OFFICE VISIT (OUTPATIENT)
Facility: LOCATION | Age: 68
End: 2023-05-31
Payer: COMMERCIAL

## 2023-05-31 DIAGNOSIS — H93.293 ABNORMAL AUDITORY PERCEPTION OF BOTH EARS: Primary | ICD-10-CM

## 2023-05-31 DIAGNOSIS — H93.13 TINNITUS OF BOTH EARS: Primary | ICD-10-CM

## 2023-05-31 DIAGNOSIS — H69.83 DYSFUNCTION OF BOTH EUSTACHIAN TUBES: ICD-10-CM

## 2023-05-31 PROCEDURE — 92557 COMPREHENSIVE HEARING TEST: CPT | Performed by: AUDIOLOGIST

## 2023-05-31 PROCEDURE — 99213 OFFICE O/P EST LOW 20 MIN: CPT | Performed by: OTOLARYNGOLOGY

## 2023-05-31 PROCEDURE — 92567 TYMPANOMETRY: CPT | Performed by: AUDIOLOGIST

## 2023-05-31 NOTE — PROGRESS NOTES
Damien Saunders is a 75year old male. No chief complaint on file. HPI:   He has a history of ringing in his ears. He also has a history of fullness and pressure on his ears. REVIEW OF SYSTEMS:   GENERAL HEALTH: feels well otherwise  GENERAL : denies fever, chills, sweats, weight loss, weight gain  SKIN: denies any unusual skin lesions or rashes  RESPIRATORY: denies shortness of breath with exertion  NEURO: denies headaches    EXAM:   There were no vitals taken for this visit. System Findings Details   Constitutional  Overall appearance - Normal.   Psychiatric  Orientation - Oriented to time, place, person & situation. Appropriate mood and affect. Head/Face  Facial features -- Normal. Skull - Normal.   Eyes  Pupils equal ,round ,react to light and accomidate   Ears, Nose, Throat, Neck   there is a wax sitting on the eardrums bilaterally moved today on the microscope ears clear nose clear pharynx clear neck no masses   Neurological  Memory - Normal. Cranial nerves - Cranial nerves II through XII grossly intact. Lymph Detail  Submental. Submandibular. Anterior cervical. Posterior cervical. Supraclavicular. ASSESSMENT AND PLAN:   1. Tinnitus of both ears  Audiogram reveals high-frequency hearing loss. Is likely contributing to the tinnitus. He has not been exercising and he noticed the tinnitus worsened. Exercise is 1 way of helping the tinnitus by increasing blood flow to the cochlea. He may also try magnesium. 2. Dysfunction of both eustachian tubes  Pressure symptoms improved after removing the wax. The patient indicates understanding of these issues and agrees to the plan. No follow-ups on file.     Raquel Camargo MD  5/31/2023  12:09 PM

## 2023-05-31 NOTE — PROGRESS NOTES
Wing Chaney was seen for audiometric evaluation today. Referred back to physician.      Eladia Shultz

## 2023-06-02 ENCOUNTER — HOSPITAL ENCOUNTER (OUTPATIENT)
Dept: MRI IMAGING | Age: 68
Discharge: HOME | End: 2023-06-02
Attending: PHYSICIAN ASSISTANT
Payer: COMMERCIAL

## 2023-06-02 DIAGNOSIS — M54.16 LUMBAR RADICULOPATHY: ICD-10-CM

## 2023-06-02 DIAGNOSIS — R29.898 WEAKNESS OF RIGHT LOWER EXTREMITY: ICD-10-CM

## 2023-06-02 PROCEDURE — 72148 MRI LUMBAR SPINE W/O DYE: CPT | Performed by: PHYSICIAN ASSISTANT

## 2023-06-07 ENCOUNTER — OFFICE VISIT (OUTPATIENT)
Dept: SURGERY | Facility: CLINIC | Age: 68
End: 2023-06-07
Payer: COMMERCIAL

## 2023-06-07 ENCOUNTER — TELEPHONE (OUTPATIENT)
Dept: ORTHOPEDICS CLINIC | Facility: CLINIC | Age: 68
End: 2023-06-07

## 2023-06-07 VITALS
HEIGHT: 71 IN | SYSTOLIC BLOOD PRESSURE: 122 MMHG | BODY MASS INDEX: 28 KG/M2 | HEART RATE: 74 BPM | DIASTOLIC BLOOD PRESSURE: 64 MMHG | WEIGHT: 200 LBS

## 2023-06-07 DIAGNOSIS — M25.562 LEFT KNEE PAIN, UNSPECIFIED CHRONICITY: ICD-10-CM

## 2023-06-07 DIAGNOSIS — M48.061 SPINAL STENOSIS OF LUMBAR REGION WITHOUT NEUROGENIC CLAUDICATION: Primary | ICD-10-CM

## 2023-06-07 DIAGNOSIS — R29.898 WEAKNESS OF RIGHT LOWER EXTREMITY: ICD-10-CM

## 2023-06-07 DIAGNOSIS — M25.562 ACUTE PAIN OF LEFT KNEE: ICD-10-CM

## 2023-06-07 DIAGNOSIS — R29.898 HIP TIGHTNESS: ICD-10-CM

## 2023-06-07 DIAGNOSIS — M25.551 RIGHT HIP PAIN: Primary | ICD-10-CM

## 2023-06-07 PROCEDURE — 3008F BODY MASS INDEX DOCD: CPT | Performed by: PHYSICIAN ASSISTANT

## 2023-06-07 PROCEDURE — 99212 OFFICE O/P EST SF 10 MIN: CPT | Performed by: PHYSICIAN ASSISTANT

## 2023-06-07 PROCEDURE — 3078F DIAST BP <80 MM HG: CPT | Performed by: PHYSICIAN ASSISTANT

## 2023-06-07 PROCEDURE — 3074F SYST BP LT 130 MM HG: CPT | Performed by: PHYSICIAN ASSISTANT

## 2023-06-07 RX ORDER — METHYLPREDNISOLONE 4 MG/1
TABLET ORAL
Qty: 1 EACH | Refills: 0 | Status: SHIPPED | OUTPATIENT
Start: 2023-06-07

## 2023-06-07 RX ORDER — MELOXICAM 15 MG/1
15 TABLET ORAL DAILY
Qty: 30 TABLET | Refills: 0 | Status: SHIPPED | OUTPATIENT
Start: 2023-06-07

## 2023-06-07 NOTE — TELEPHONE ENCOUNTER
Patient has an upcoming appt for Rt hip and left knee . At this time patient has not had any imaging done, please place RX accordingly, patient wants to call cs to have imaging done prior.     Future Appointments   Date Time Provider Esthela Marquez   6/14/2023  8:30 AM GREGORY Oviedo ENIPain [facility]   7/17/2023  3:20 PM Jinny Lam MD [facility]

## 2023-06-07 NOTE — PROGRESS NOTES
Established patient:  Reason for follow up:  Discuss MRI   Left knee pain, started Tuesday morning     Numeric Rating Scale:   Pain at Present:  5/10       Distribution of Pain:   Low back     Most recent treatments for Current Pain Condition:   Advil  Response to treatment: some relief    New imaging or testing since your last office visit:   MRI spine lumbar 06/02/23

## 2023-06-07 NOTE — TELEPHONE ENCOUNTER
Reviewed patients chart, xray orders are required. Order placed for left knee and right hip xrays.    Please contact patient advise to arrive 15 mins prior to patients appt to complete x-ray order and schedule patients xray appt-Thank you

## 2023-06-09 ENCOUNTER — HOSPITAL ENCOUNTER (OUTPATIENT)
Dept: GENERAL RADIOLOGY | Age: 68
Discharge: HOME | End: 2023-06-09
Attending: ORTHOPAEDIC SURGERY
Payer: COMMERCIAL

## 2023-06-09 DIAGNOSIS — M25.551 RIGHT HIP PAIN: ICD-10-CM

## 2023-06-09 DIAGNOSIS — M25.562 LEFT KNEE PAIN, UNSPECIFIED CHRONICITY: ICD-10-CM

## 2023-06-09 PROCEDURE — 73564 X-RAY EXAM KNEE 4 OR MORE: CPT | Performed by: ORTHOPAEDIC SURGERY

## 2023-06-09 PROCEDURE — 73502 X-RAY EXAM HIP UNI 2-3 VIEWS: CPT | Performed by: ORTHOPAEDIC SURGERY

## 2023-06-14 ENCOUNTER — OFFICE VISIT (OUTPATIENT)
Dept: PAIN CLINIC | Facility: CLINIC | Age: 68
End: 2023-06-14
Payer: COMMERCIAL

## 2023-06-14 VITALS
HEART RATE: 72 BPM | SYSTOLIC BLOOD PRESSURE: 112 MMHG | BODY MASS INDEX: 29 KG/M2 | WEIGHT: 206 LBS | DIASTOLIC BLOOD PRESSURE: 70 MMHG | OXYGEN SATURATION: 94 %

## 2023-06-14 DIAGNOSIS — M48.062 SPINAL STENOSIS OF LUMBAR REGION WITH NEUROGENIC CLAUDICATION: Primary | ICD-10-CM

## 2023-06-14 PROCEDURE — 99214 OFFICE O/P EST MOD 30 MIN: CPT | Performed by: PHYSICIAN ASSISTANT

## 2023-06-14 PROCEDURE — 3078F DIAST BP <80 MM HG: CPT | Performed by: PHYSICIAN ASSISTANT

## 2023-06-14 PROCEDURE — 3074F SYST BP LT 130 MM HG: CPT | Performed by: PHYSICIAN ASSISTANT

## 2023-06-15 ENCOUNTER — TELEPHONE (OUTPATIENT)
Dept: PAIN CLINIC | Facility: CLINIC | Age: 68
End: 2023-06-15

## 2023-06-15 DIAGNOSIS — M54.16 LUMBAR RADICULITIS: Primary | ICD-10-CM

## 2023-06-15 NOTE — TELEPHONE ENCOUNTER
Initiated PA with Ilene Gann for [address] [place] [address]  / Uploaded clinical documentation / Case # [id number]

## 2023-06-20 ENCOUNTER — OFFICE VISIT (OUTPATIENT)
Dept: PHYSICAL THERAPY | Age: 68
End: 2023-06-20
Attending: PHYSICIAN ASSISTANT
Payer: COMMERCIAL

## 2023-06-20 ENCOUNTER — TELEPHONE (OUTPATIENT)
Dept: PHYSICAL THERAPY | Facility: HOSPITAL | Age: 68
End: 2023-06-20

## 2023-06-20 DIAGNOSIS — M25.562 ACUTE PAIN OF LEFT KNEE: ICD-10-CM

## 2023-06-20 DIAGNOSIS — R29.898 WEAKNESS OF RIGHT LOWER EXTREMITY: ICD-10-CM

## 2023-06-20 DIAGNOSIS — R29.898 HIP TIGHTNESS: ICD-10-CM

## 2023-06-20 DIAGNOSIS — M48.061 SPINAL STENOSIS OF LUMBAR REGION WITHOUT NEUROGENIC CLAUDICATION: ICD-10-CM

## 2023-06-20 PROCEDURE — 97162 PT EVAL MOD COMPLEX 30 MIN: CPT

## 2023-06-20 PROCEDURE — 97110 THERAPEUTIC EXERCISES: CPT

## 2023-06-20 NOTE — TELEPHONE ENCOUNTER
Prior authorization request completed for: MICHAEL   Authorization # [id number]  Authorization dates: 6/20/2023 - 12/17/2023  CPT codes approved: 57426  Number of visits/dates of service approved: 1  Physician: Dr. Carina Ferrara   Location: [address] [place] [address]      Patient can be scheduled. Routed to Navigator.

## 2023-06-23 ENCOUNTER — OFFICE VISIT (OUTPATIENT)
Dept: PHYSICAL THERAPY | Age: 68
End: 2023-06-23
Attending: PHYSICIAN ASSISTANT
Payer: COMMERCIAL

## 2023-06-23 PROCEDURE — 97140 MANUAL THERAPY 1/> REGIONS: CPT

## 2023-06-23 PROCEDURE — 97110 THERAPEUTIC EXERCISES: CPT

## 2023-06-26 ENCOUNTER — OFFICE VISIT (OUTPATIENT)
Dept: PHYSICAL THERAPY | Age: 68
End: 2023-06-26
Attending: PHYSICIAN ASSISTANT
Payer: COMMERCIAL

## 2023-06-26 PROCEDURE — 97110 THERAPEUTIC EXERCISES: CPT

## 2023-06-26 PROCEDURE — 97140 MANUAL THERAPY 1/> REGIONS: CPT

## 2023-06-27 ENCOUNTER — OFFICE VISIT (OUTPATIENT)
Dept: PHYSICAL THERAPY | Age: 68
End: 2023-06-27
Attending: PHYSICIAN ASSISTANT
Payer: COMMERCIAL

## 2023-06-27 PROCEDURE — 97110 THERAPEUTIC EXERCISES: CPT

## 2023-06-27 PROCEDURE — 97140 MANUAL THERAPY 1/> REGIONS: CPT

## 2023-07-05 ENCOUNTER — OFFICE VISIT (OUTPATIENT)
Dept: PHYSICAL THERAPY | Age: 68
End: 2023-07-05
Attending: PHYSICIAN ASSISTANT
Payer: COMMERCIAL

## 2023-07-05 PROCEDURE — 97140 MANUAL THERAPY 1/> REGIONS: CPT

## 2023-07-05 PROCEDURE — 97110 THERAPEUTIC EXERCISES: CPT

## 2023-07-05 NOTE — PROGRESS NOTES
Diagnosis:   Spinal stenosis of lumbar region without neurogenic claudication (M48.061)  Hip tightness (R29.898)  Weakness of right lower extremity (R29.898)  Acute pain of left knee (M32.483)      Referring Provider: Jalen Fuchs  Date of Evaluation:    6/20/2023    Precautions:  None Next MD visit:   Lumbar Epidural Steroid Injection scheduled 7/11/2023  Date of Surgery: n/a   Insurance Primary/Secondary: [employer] / N/A     # Auth Visits: 60v limit, no auth            Subjective: Pt reports pain to the R side of low back/ glute referring down into proximal anterior thigh. Exercises do help, except for the pelvic tilt - increased pain. Pain: 3/10 - R side lumbar      Objective: See Flowsheet for details  6/27/2023:  Pre-test: lumbar flexion AROM min restricted 2/10. R hip PROM: ER mod restricted, IR mod restricted. Post-test: lumbar flexion AROM min restricted but more than pre, no pain & increased ease of movement. Continues with reduced antalgia with more natural gait pattern, less guarding, more upright.    6/23/2023:  Palpation: increased tension to R QL & R LPSM. ROM: Continues with restricted R hip ER & IR, improved grossly with mobs/ MWM. Lumbar flex iniitially mod restricted improved to min restricted by end of visit. Lumbar ext micheal restricted/ unable due to increased pain. Gait: by end of visit pt had reduced forward lean      Assessment: Continues with relief with manual RLE traction. Initial tension to R piriformis reduced with sustained STM/ MFR. Relief with new ex's & pt to add to HEP. Trial of supine & seated active piriformis stretching, but limited hip ROM prevented full excursion available for stretching. Overall decreased pain reported by end of visit.       Goals: (to be met in 8 visits)  Pt will improve transversus abdominis recruitment to perform proper isometric contraction without requiring verbal or tactile cuing to promote advancement of therex   Pt will demonstrate good understanding of proper posture and body mechanics to decrease pain and improve spinal safety   Pt will report the ability to tolerate sitting > 60 minutes with pain at worst 4/10 for driving & work duties. Pt will improve B hip abduction strength to 4+/5 for community ambulation  Pt will improve R hip IR & ER ROM to mod restricted or better for ability to don R sock & shoe with improved ease  Pt will report ability to sleep at night waking max of 1x for improved ability to focus & function daytime. Pt will be independent and compliant with comprehensive HEP to maintain progress achieved in PT     Plan: Assess for update following new ex's today. Injection scheduled for 7/11  Date: 6/23/2023  TX#: 2/8 Date: 6/26/2023  TX#: 3/8 Date: 6/27/2023  TX#: 4/8 Date: 7/5/2023  TX#: 5/8 Date: Tx#: 6/   Manual Therapy: 24'  R hip inf, lat mobs gr II-III/ MWM flex, IR, ER (supine)  R lumbar (emphasis L5-S1) unilateral distraction gr II-III (L sidelying - neutral)  STM R QL, LPSM (L sidelying)   Therex: 16'  Trial of lumbar positional distraction stretch over L bolster - defer  Lumbar decompressive stretch supine 90/90 position x 8' - instruction in parameters for adding to HEP - no increased peripheralization  Education: deferring prolonged walking (ext) as exercise for now focusing on more neutral to flexion based cardio with goal of incorporating as symptoms reduce.  Manual Therapy: 28'  R hip inf, lat mobs gr II-III/ MWM flex, IR, ER (supine)  R lumbar (emphasis L5-S1) unilateral distraction gr II-III (L sidelying - neutral)  STM R Piriformis, Glute Med, QL, LPSM (L sidelying)  Therex: 8'  LTR x 5'  PPT x 30 Manual Therapy: 25'  Supine:  Bilateral traction with LEs first over Swiss Ball->stepstool (90/90 position) with mobilization belt  RLE traction in OPP with mobilization belt  Test<>Re-test before & after manual Tx: see above  Therex: 15'  Hip Abduction Ana Cristina with Pilates Ring (purple) 5\" hold, 2x15 hooklying - add to HEP with Merilee Sic TheraBand  Hip Adduction Ana Cristina with Pilates Ring (purple) 5\" hold, 2x15 hooklying - add to HEP with ball squeeze, folded pillow, or rolled blanket Manual Therapy: 25'  Supine:   RLE traction in OPP with mobilization belt  R hip inf, lat mobs gr II-III/ MWM flex, IR, ER (supine)  L Sidelying:  STM/ MFR R Piriformis, Glute Med  Therex: 15'  Doorway QL stretch: L; 3x30\" - instruction for HEP  Flat back stretch @ // bars 3x30\" - instruction for HEP  Instruction in self-performed RLE traction off bed or floor with belt/ towel/ sheet  Defer PPT for HEP    HEP:  PPT in hooklying, LTR.  6/23/2023: lumbar decompressive stretch supine 90/90 position  6/27/2023: hip abduction isometric with Zarina Darting, hip adduction isometric with ball squeeze, folded pillow, or rolled blanket  7/5/2023: doorway QL stretch, flat back stretch, self-performed RLE traction.  Pt denied need for written handout    Charges: Manual x 2, Therex x 1       Total Timed Treatment: 40 min  Total Treatment Time: 40 min

## 2023-07-07 ENCOUNTER — OFFICE VISIT (OUTPATIENT)
Dept: PHYSICAL THERAPY | Age: 68
End: 2023-07-07
Attending: PHYSICIAN ASSISTANT
Payer: COMMERCIAL

## 2023-07-07 PROCEDURE — 97110 THERAPEUTIC EXERCISES: CPT

## 2023-07-07 PROCEDURE — 97014 ELECTRIC STIMULATION THERAPY: CPT

## 2023-07-07 PROCEDURE — 97140 MANUAL THERAPY 1/> REGIONS: CPT

## 2023-07-07 NOTE — PROGRESS NOTES
Diagnosis:   Spinal stenosis of lumbar region without neurogenic claudication (M48.061)  Hip tightness (R29.898)  Weakness of right lower extremity (R29.898)  Acute pain of left knee (L83.149)      Referring Provider: Bharath Gil  Date of Evaluation:    6/20/2023    Precautions:  None Next MD visit:   Lumbar Epidural Steroid Injection scheduled 7/11/2023  Date of Surgery: n/a   Insurance Primary/Secondary: [employer] INS / N/A     # Auth Visits: 60v limit, no auth            Subjective: Pt reports increased pain today. Did not sleep well. Did obtain relief to the low back with the new doorway stretch but afterwards has increased pain down the RLE. Currently has pain referring down RLE & knee is throbbing. Pain: 5-6/10      Objective: See Flowsheet for details  7/7/2023:  Before initiated IFC e-stim confirmed no contraindications- no pacemaker or implantable devices. Skin check WNL throughout after electrode removal. + referral of anterior thigh discomfort with palpation to/ STM at R Psoas Major; STRs R TFL    6/27/2023:  Pre-test: lumbar flexion AROM min restricted 2/10. R hip PROM: ER mod restricted, IR mod restricted. Post-test: lumbar flexion AROM min restricted but more than pre, no pain & increased ease of movement. Continues with reduced antalgia with more natural gait pattern, less guarding, more upright.    6/23/2023:  Palpation: increased tension to R QL & R LPSM. ROM: Continues with restricted R hip ER & IR, improved grossly with mobs/ MWM. Lumbar flex iniitially mod restricted improved to min restricted by end of visit. Lumbar ext micheal restricted/ unable due to increased pain. Gait: by end of visit pt had reduced forward lean      Assessment: Pt presented at start of session with increased subjective pain reports with peripheralization of RLE symptoms as well as increased antalgic & slightly fwd trunk lean gait pattern.  Modified start of care today with initiation of IFC electrical stimulation with pt in agreement for modification, for more immediate pain relief in lumbar decompressive 90/90 supine position. Pt reported alleviation of symptoms noted after e-stim with more upright gait pattern noted & less antalgia throughout & end of session. Relief noted post-hip flexor stretching, & added to HEP. By end of session pt reported almost full alleviation of pain present at start of visit today. Discussed possible benefit from home TENS unit based on relief from [facility] [place] today. Goals: (to be met in 8 visits)  Pt will improve transversus abdominis recruitment to perform proper isometric contraction without requiring verbal or tactile cuing to promote advancement of therex   Pt will demonstrate good understanding of proper posture and body mechanics to decrease pain and improve spinal safety   Pt will report the ability to tolerate sitting > 60 minutes with pain at worst 4/10 for driving & work duties. Pt will improve B hip abduction strength to 4+/5 for community ambulation  Pt will improve R hip IR & ER ROM to mod restricted or better for ability to don R sock & shoe with improved ease  Pt will report ability to sleep at night waking max of 1x for improved ability to focus & function daytime. Pt will be independent and compliant with comprehensive HEP to maintain progress achieved in PT     Plan: Continued efforts at improving lumbar & hip mobility; consider progressing core strength/ abdominal activation;  Injection scheduled for 7/11  Date: 6/23/2023  TX#: 2/8 Date: 6/26/2023  TX#: 3/8 Date: 6/27/2023  TX#: 4/8 Date: 7/5/2023  TX#: 5/8 Date: 7/7/2023   Tx#: 6/8   Manual Therapy: 24'  R hip inf, lat mobs gr II-III/ MWM flex, IR, ER (supine)  R lumbar (emphasis L5-S1) unilateral distraction gr II-III (L sidelying - neutral)  STM R QL, LPSM (L sidelying)   Therex: 16'  Trial of lumbar positional distraction stretch over L bolster - defer  Lumbar decompressive stretch supine 90/90 position x 8' - instruction in parameters for adding to HEP - no increased peripheralization  Education: deferring prolonged walking (ext) as exercise for now focusing on more neutral to flexion based cardio with goal of incorporating as symptoms reduce. Manual Therapy: 28'  R hip inf, lat mobs gr II-III/ MWM flex, IR, ER (supine)  R lumbar (emphasis L5-S1) unilateral distraction gr II-III (L sidelying - neutral)  STM R Piriformis, Glute Med, QL, LPSM (L sidelying)  Therex: 8'  LTR x 5'  PPT x 30 Manual Therapy: 25'  Supine:  Bilateral traction with LEs first over Swiss Ball->stepstool (90/90 position) with mobilization belt  RLE traction in OPP with mobilization belt  Test<>Re-test before & after manual Tx: see above  Therex: 15'  Hip Abduction Ana Cristina with Pilates Ring (purple) 5\" hold, 2x15 hooklying - add to HEP with Marleta Generous  Hip Adduction Ana Cristina with Pilates Ring (purple) 5\" hold, 2x15 hooklying - add to HEP with ball squeeze, folded pillow, or rolled blanket Manual Therapy: 25'  Supine:   RLE traction in OPP with mobilization belt  R hip inf, lat mobs gr II-III/ MWM flex, IR, ER (supine)  L Sidelying:  STM/ MFR R Piriformis, Glute Med  Therex: 15'  Doorway QL stretch: L; 3x30\" - instruction for HEP  Flat back stretch @ // bars 3x30\" - instruction for HEP  Instruction in self-performed RLE traction off bed or floor with belt/ towel/ sheet  Defer PPT for HEP Modalities: 15'  IFC Electrical Stimulation in supine 90/90 position with cold pack to lumbar spine (extra towel layer); 40% scan max of 15v. X 15' with check-ins regularly with pt throughout  Therex: 20'  Seated lumbar flexion stretch with Swiss Ball: lateral deviation R/L, fwd. X 5' total  Defer doorway QL stretch   Modified anaid hip flexor stretch tableside: with gentle manual OP for additional stretch to quad. Instruction to add to HEP - may use belt/ strap for additional stretch to simulate PT OP.  X 5' total  Education: connection between lumbar & hip, including connection with Iliopsoas  Manual Therapy: 10'  STM/ MFR: R Iliopsoas, TFL (supine)   HEP:  PPT in hooklying, LTR.  6/23/2023: lumbar decompressive stretch supine 90/90 position  6/27/2023: hip abduction isometric with Angela Antes, hip adduction isometric with ball squeeze, folded pillow, or rolled blanket  7/5/2023: doorway QL stretch, flat back stretch, self-performed RLE traction.  Pt denied need for written handout  7/7/2023: modified anaid hip flexor stretch off side of bed    Charges: Unattended e-stim x 1, Therex x 1, Manual x 1       Total Timed Treatment: 45 min  Total Treatment Time: 45 min

## 2023-07-10 ENCOUNTER — OFFICE VISIT (OUTPATIENT)
Dept: PHYSICAL THERAPY | Age: 68
End: 2023-07-10
Attending: PHYSICIAN ASSISTANT
Payer: COMMERCIAL

## 2023-07-10 PROCEDURE — 97140 MANUAL THERAPY 1/> REGIONS: CPT

## 2023-07-10 NOTE — PROGRESS NOTES
Diagnosis:   Spinal stenosis of lumbar region without neurogenic claudication (M48.061)  Hip tightness (R29.898)  Weakness of right lower extremity (K97.546)  Acute pain of left knee (P35.668)      Referring Provider: Liana Gao  Date of Evaluation:    6/20/2023    Precautions:  None Next MD visit:   Lumbar Epidural Steroid Injection scheduled 7/11/2023  Dr. Hay Hearing hip ortho 7/17/2023  Date of Surgery: n/a   Insurance Primary/Secondary: [employer] INS / N/A     # Auth Visits: 60v limit, no auth            Subjective: Pt reports back symptoms are subsiding. Nerve symptoms more flared up today. Had been better after last visit. Was bending forward a few times today before coming here - increased symptoms. \"Little stuff\". Sit to stand increases symptoms     Pain: 5-6/10      Objective: See Flowsheet for details  7/10/2023:  Standing: + pain R anterior thigh after movement; reduced in sitting. Neutral pelvic alignment in supine. Joint mobility: hypomob L1-L5 with + jt signs worse L3 < L5 & sacrum central PA. + antalgia after joint mobs; reduced antalgia after hip mobs & STM/ MFR    7/7/2023:  Before initiated IFC e-stim confirmed no contraindications- no pacemaker or implantable devices. Skin check WNL throughout after electrode removal. + referral of anterior thigh discomfort with palpation to/ STM at R Psoas Major; STRs R TFL    6/27/2023:  Pre-test: lumbar flexion AROM min restricted 2/10. R hip PROM: ER mod restricted, IR mod restricted. Post-test: lumbar flexion AROM min restricted but more than pre, no pain & increased ease of movement. Continues with reduced antalgia with more natural gait pattern, less guarding, more upright.    6/23/2023:  Palpation: increased tension to R QL & R LPSM. ROM: Continues with restricted R hip ER & IR, improved grossly with mobs/ MWM. Lumbar flex iniitially mod restricted improved to min restricted by end of visit. Lumbar ext micheal restricted/ unable due to increased pain.  Gait: by end of visit pt had reduced forward lean      Assessment: Peripheralization of symptoms with lumbosacral central PA mobs though able to reduce RLE symptoms with hip mobs/ MWM & MFR. Manual RLE long axis distraction/ traction did not alleviate symptoms sufficiently today as previously noted. Goals: (to be met in 8 visits)  Pt will improve transversus abdominis recruitment to perform proper isometric contraction without requiring verbal or tactile cuing to promote advancement of therex   Pt will demonstrate good understanding of proper posture and body mechanics to decrease pain and improve spinal safety   Pt will report the ability to tolerate sitting > 60 minutes with pain at worst 4/10 for driving & work duties. Pt will improve B hip abduction strength to 4+/5 for community ambulation  Pt will improve R hip IR & ER ROM to mod restricted or better for ability to don R sock & shoe with improved ease  Pt will report ability to sleep at night waking max of 1x for improved ability to focus & function daytime. Pt will be independent and compliant with comprehensive HEP to maintain progress achieved in PT     Plan: Continued efforts at improving lumbar & hip mobility; consider progressing core strength/ abdominal activation; Injection scheduled for 7/11. Next visit: requires Progress Note if recommending continuation of care past visit 8. Pt to confirm with physician that he is able to come in for PT session Weds after injection Tues.   Date: 6/23/2023  TX#: 2/8 Date: 6/26/2023  TX#: 3/8 Date: 6/27/2023  TX#: 4/8 Date: 7/5/2023  TX#: 5/8 Date: 7/7/2023   Tx#: 6/8 Date: 7/10/2023  Tx#: 7/8   Manual Therapy: 24'  R hip inf, lat mobs gr II-III/ MWM flex, IR, ER (supine)  R lumbar (emphasis L5-S1) unilateral distraction gr II-III (L sidelying - neutral)  STM R QL, LPSM (L sidelying)   Therex: 16'  Trial of lumbar positional distraction stretch over L bolster - defer  Lumbar decompressive stretch supine 90/90 position x 8' - instruction in parameters for adding to HEP - no increased peripheralization  Education: deferring prolonged walking (ext) as exercise for now focusing on more neutral to flexion based cardio with goal of incorporating as symptoms reduce. Manual Therapy: 28'  R hip inf, lat mobs gr II-III/ MWM flex, IR, ER (supine)  R lumbar (emphasis L5-S1) unilateral distraction gr II-III (L sidelying - neutral)  STM R Piriformis, Glute Med, QL, LPSM (L sidelying)  Therex: 8'  LTR x 5'  PPT x 30 Manual Therapy: 25'  Supine:  Bilateral traction with LEs first over Swiss Ball->stepstool (90/90 position) with mobilization belt  RLE traction in OPP with mobilization belt  Test<>Re-test before & after manual Tx: see above  Therex: 15'  Hip Abduction Ana Cristina with Pilates Ring (purple) 5\" hold, 2x15 hooklying - add to HEP with Opal Gottron  Hip Adduction Ana Cristina with Pilates Ring (purple) 5\" hold, 2x15 hooklying - add to HEP with ball squeeze, folded pillow, or rolled blanket Manual Therapy: 25'  Supine:   RLE traction in OPP with mobilization belt  R hip inf, lat mobs gr II-III/ MWM flex, IR, ER (supine)  L Sidelying:  STM/ MFR R Piriformis, Glute Med  Therex: 15'  Doorway QL stretch: L; 3x30\" - instruction for HEP  Flat back stretch @ // bars 3x30\" - instruction for HEP  Instruction in self-performed RLE traction off bed or floor with belt/ towel/ sheet  Defer PPT for HEP Modalities: 15'  IFC Electrical Stimulation in supine 90/90 position with cold pack to lumbar spine (extra towel layer); 40% scan max of 15v. X 15' with check-ins regularly with pt throughout  Therex: 20'  Seated lumbar flexion stretch with Swiss Ball: lateral deviation R/L, fwd. X 5' total  Defer doorway QL stretch   Modified anaid hip flexor stretch tableside: with gentle manual OP for additional stretch to quad. Instruction to add to HEP - may use belt/ strap for additional stretch to simulate PT OP.  X 5' total  Education: connection between lumbar & hip, including connection with Iliopsoas  Manual Therapy: 10'  STM/ MFR: R Iliopsoas, TFL (supine) Manual Therapy: 45'  L3-L5, sacrum central PA; trial R L5 (prone over 1 pillow)  Manual myofascial traction stretch lumbar in prone over 1 pillow  RLE traction in OPP with mobilization belt  R hip inf, lat mobs gr II-III/ MWM flex, IR, ER (supine)  STM/ MFR: R Piriformis, Glute Med       HEP:  PPT in hooklying, LTR.  6/23/2023: lumbar decompressive stretch supine 90/90 position  6/27/2023: hip abduction isometric with Veatrice Fender, hip adduction isometric with ball squeeze, folded pillow, or rolled blanket  7/5/2023: doorway QL stretch, flat back stretch, self-performed RLE traction.  Pt denied need for written handout  7/7/2023: modified anaid hip flexor stretch off side of bed    Charges: Manual x 3       Total Timed Treatment: 45 min  Total Treatment Time: 45 min

## 2023-07-10 NOTE — PATIENT PROFILE ADULT. - TEACHING/LEARNING LEARNING PREFERENCES
[FreeTextEntry1] : # basophilia\par reviewed records since 2019\par Improved diet and CRP markedly improved.\par ESR/CRP wnl\par CBC reviewed. ABC 0.12 ( normal 0.1).\par flow normal, \par BCR ABL negative, \par MPNR negative\par influenza/COVID/ RSV - negative\par food allergy panel negative\par mold allergy panel + for  [employer] - unsure if this can be contributing to basophilia - given name of allergist, Dr. Cesar Vicente.\par ANALI neg/CTDC neg/ CCP neg\par quant gold - neg\par vs and labs reviewed. labs drawn in office today. Wbc 4.05, hgb 13.7, plts, 280k, ABC 0.10, basophils % 2.5, ALC 1.49. \par Will continue to monitor - I do not believe we need to do a BM bx at this time given the slight elevation in basophilia and I believe we can continue to monitor however I did discuss that I can not 100% rule out a primary bone marrow disorder and if she wants to understand definitively that nothing is concerning in the bone marrow we can arrange a biopsy - She will think about this more and decide. \par \par #Night sweats and basophilia and elevated kappa light chains\par will consider doing a PET CT in the future if this does not resolve and hormonal work up by GYN is negative\par \par #fatty liver and HLD\par improved since changing diet completely - avoids sugars and dairy.\par \par RTC in 3 months with cbc with diff, cmp, ESR/CRP, iron ferritin, immunoglobulins group instruction

## 2023-07-11 ENCOUNTER — APPOINTMENT (OUTPATIENT)
Dept: GENERAL RADIOLOGY | Facility: HOSPITAL | Age: 68
End: 2023-07-11
Attending: ANESTHESIOLOGY
Payer: COMMERCIAL

## 2023-07-11 ENCOUNTER — HOSPITAL ENCOUNTER (OUTPATIENT)
Facility: HOSPITAL | Age: 68
Setting detail: HOSPITAL OUTPATIENT SURGERY
Discharge: HOME | End: 2023-07-11
Attending: ANESTHESIOLOGY | Admitting: ANESTHESIOLOGY
Payer: COMMERCIAL

## 2023-07-11 VITALS
DIASTOLIC BLOOD PRESSURE: 72 MMHG | TEMPERATURE: 97 F | RESPIRATION RATE: 16 BRPM | OXYGEN SATURATION: 92 % | HEART RATE: 57 BPM | SYSTOLIC BLOOD PRESSURE: 138 MMHG

## 2023-07-11 PROCEDURE — 3E0R33Z INTRODUCTION OF ANTI-INFLAMMATORY INTO SPINAL CANAL, PERCUTANEOUS APPROACH: ICD-10-PCS | Performed by: ANESTHESIOLOGY

## 2023-07-11 PROCEDURE — 62323 NJX INTERLAMINAR LMBR/SAC: CPT | Performed by: ANESTHESIOLOGY

## 2023-07-11 PROCEDURE — 3E0R3BZ INTRODUCTION OF ANESTHETIC AGENT INTO SPINAL CANAL, PERCUTANEOUS APPROACH: ICD-10-PCS | Performed by: ANESTHESIOLOGY

## 2023-07-11 RX ORDER — SODIUM CHLORIDE, SODIUM LACTATE, POTASSIUM CHLORIDE, CALCIUM CHLORIDE 600; 310; 30; 20 MG/100ML; MG/100ML; MG/100ML; MG/100ML
100 INJECTION, SOLUTION INTRAVENOUS CONTINUOUS
Status: DISCONTINUED | OUTPATIENT
Start: 2023-07-11 | End: 2023-07-11

## 2023-07-11 RX ORDER — ONDANSETRON 2 MG/ML
4 INJECTION INTRAMUSCULAR; INTRAVENOUS ONCE AS NEEDED
Status: DISCONTINUED | OUTPATIENT
Start: 2023-07-11 | End: 2023-07-11

## 2023-07-11 RX ORDER — DEXAMETHASONE SODIUM PHOSPHATE 10 MG/ML
INJECTION, SOLUTION INTRAMUSCULAR; INTRAVENOUS
Status: DISCONTINUED | OUTPATIENT
Start: 2023-07-11 | End: 2023-07-11

## 2023-07-11 RX ORDER — DIPHENHYDRAMINE HYDROCHLORIDE 50 MG/ML
50 INJECTION INTRAMUSCULAR; INTRAVENOUS ONCE AS NEEDED
Status: DISCONTINUED | OUTPATIENT
Start: 2023-07-11 | End: 2023-07-11

## 2023-07-11 RX ORDER — MIDAZOLAM HYDROCHLORIDE 1 MG/ML
INJECTION INTRAMUSCULAR; INTRAVENOUS
Status: DISCONTINUED | OUTPATIENT
Start: 2023-07-11 | End: 2023-07-11

## 2023-07-11 RX ORDER — LIDOCAINE HYDROCHLORIDE 10 MG/ML
INJECTION, SOLUTION EPIDURAL; INFILTRATION; INTRACAUDAL; PERINEURAL
Status: DISCONTINUED | OUTPATIENT
Start: 2023-07-11 | End: 2023-07-11

## 2023-07-11 RX ORDER — SODIUM CHLORIDE 9 MG/ML
INJECTION INTRAVENOUS
Status: DISCONTINUED | OUTPATIENT
Start: 2023-07-11 | End: 2023-07-11

## 2023-07-11 NOTE — DISCHARGE INSTRUCTIONS
You have been given medication that makes you sleepy. This may have included both pain medication and sleeping medication. Most of the effects have worn off but you may still have some drowsiness for the next 6 to 8 hours. Home Care  Follow these guidelines when you get home:    --Don't drink any alcohol for the next 24 hours. --Don't drive, operate dangerous machinery, make important business or personal    decisions, or sign legal documents during the next 24 hours. Follow Up Care  Follow up with your healthcare provider if you are not alert and back to your usual level of activity within 12 hours    When to seek medical advice  Call your healthcare provider right away if any of these occur:    --Drowsiness that gets worse  --Weakness or dizziness that gets worse  --Repeated vomiting  --You can not be awakened   Home Care Instructions Following Your Pain Procedure     Shahrzad Delgadillo,  It has been a pleasure to have you as our patient. To help you at home, you must follow these general discharge instructions. We will review these with you before you are discharged. It is our hope that you have a complete and uneventful recovery from our procedure. General Instructions:  What to Expect:  Bandages from your procedure today can be removed when you get home. Please avoid soaking and/or swimming for 24 hours. Showering is okay  It is normal to have increased pain symptoms and/or pain at injection site for up to 3-5 days after procedure, you can use heat or ice (20 minutes on 20 minutes off) for comfort. You may experience some temporary side effects which may include restlessness or insomnia, flushing of the face, or heart palpitations. Please contact the provider if these symptoms do not resolve within 3-4 days. Lightheadedness or nausea may occur and should resolve within 24 to 48 hours.   If you develop a headache after treatment, rest, drink fluids (with caffeine, if possible) and take mild over-the-counter pain medication. If the headache does not improve with the above treatment, contact the physician. Home Medications:  Resume all previously prescribed medication. Please avoid taking NSAIDs (Non-Steriodal Anti-Inflammatory Drugs) such as:  Ibuprofen ( Advil, Motrin) Aleve (Naproxen), Diclofenac, Meloxicam for 6 hours after procedure. If you are on Coumadin (Warfarin) or any other anti-coagulant (or \"blood thinning\") medication such as Plavix (Clopidogrel), Xarelto (Rivaroxaban), Eliquis (Apixaban), Effient (Prasugrel) etc., restart on the following day from the procedure unless otherwise directed by your provider. If you are a diabetic, please increase the frequency of your glucose monitoring after the procedure as steroids may cause a temporary (2-3 day) increase in your blood sugar. Contact your primary care physician if your blood sugar remains elevated as you may require some medication adjustment. Diet:  Resume your regular diet as tolerated. Activity: We recommend that you relax and rest during the rest of your procedure day. If you feel weakness in your arms or legs do not drive. Follow-up Appointment  Please schedule a follow-up visit within 3 to 4 weeks after your last procedure date. Question or Concerns:  Feel free to call our office with any questions or concerns at [phone] (option #2)    Shahrzad Delgadillo  Thank you for coming to [facility] for your procedure. The nurses try very hard to make sure you receive the best care possible. Your trust in them as well as us is greatly appreciated.     Thanks so much,   Dr. Marv Mcwilliams

## 2023-07-11 NOTE — H&P
History & Physical Examination    Patient Name: Wing Chaney  MRN: [id number]  CSN: [id number]  YOB: 1955    Pre-Operative Diagnosis:  Lumbar radiculitis [M54.16]    Present Illness: Patient with lumbar radiculitis    Meloxicam 15 MG Oral Tab, Take 1 tablet (15 mg total) by mouth daily. Start after steroids. , Disp: 30 tablet, Rfl: 0, 7/10/2023  amLODIPine 5 MG Oral Tab, Take 1 tablet (5 mg total) by mouth daily. , Disp: 90 tablet, Rfl: 1, 2023  [] cyclobenzaprine 5 MG Oral Tab, Take 1 tablet (5 mg total) by mouth nightly as needed for Muscle spasms. , Disp: 10 tablet, Rfl: 0, 2023  [] naproxen 500 MG Oral Tab, Take 1 tablet (500 mg total) by mouth 2 (two) times daily with meals for 7 days. , Disp: 14 tablet, Rfl: 0, 2023      lactated ringers infusion, 100 mL/hr, Intravenous, Continuous  ondansetron (Zofran) 4 MG/2ML injection 4 mg, 4 mg, Intravenous, Once PRN        Allergies: No Known Allergies    Past Medical History:   Diagnosis Date    Calculus of kidney 2012    Essential hypertension 05/10/2013    Essential hypertension     Paroxysmal supraventricular tachycardia ([facility] [address] 2012     Past Surgical History:   Procedure Laterality Date    COLONOSCOPY      NECK/CHEST PROCEDURE UNLISTED      right neck lymph    OTHER Right 2018    Right knee repair, not replacement. REPAIR OF NASAL SEPTUM       Family History   Problem Relation Age of Onset    Cancer Father         Lung     Social History    Tobacco Use      Smoking status: Never      Smokeless tobacco: Never    Alcohol use:  Yes      Alcohol/week: 0.0 standard drinks of alcohol      Comment: 1 drink a week      SYSTEM Check if Review is Normal Check if Physical Exam is Normal If not normal, please explain:   HEENT [x ] [x ]    NECK & BACK [x ] [x ]    HEART [x ] [x ]    LUNGS [x ] [x ]    ABDOMEN [x ] [x ]    UROGENITAL [x ] [x ]    EXTREMITIES [x ] [x ]    OTHER        [ x ] I have discussed the risks and benefits and alternatives with the patient/family. They understand and agree to proceed with plan of care. [ x ] I have reviewed the History and Physical done within the last 30 days. Any changes noted above.     Marvella Holter, MD

## 2023-07-11 NOTE — OPERATIVE REPORT
[facility]  Operative Report  2023 [facility] Patient Status:  Hospital Outpatient Surgery    1955 MRN [id number]   Location [address] [address] Attending Ashlyn Wiley MD   [address] Day # 0 PCP Jesus Perez MD     Indication: Hawa Grant is a 75year old male lumbar radiculitis    Preoperative Diagnosis:  Lumbar radiculitis [M54.16]    Postoperative Diagnosis: Same as above. Procedure performed: LUMBAR INTERLAMINAR EPIDURAL INJECTION with sedation    Anesthesia: Local and IV Sedation. EBL: Less than 1 ml. Procedure Description:  After reviewing the patient's history and performing a focused physical examination, the diagnosis and positive previous diagnostic tests were confirmed and contraindications such as infection and coagulopathy were ruled out. Following review of allergies and potential side effects and complications, including but not necessarily limited to infection, allergic reaction, local tissue breakdown, nerve injury, post-dural puncture headache and paresis, the patient consented for the procedure. The patient was brought to the procedure room in prone position. After comprehensive monitors were applied and IV access in the patient's arm, moderate intravenous sedation was given with versed and fentanyl. Moderate sedation was given for 6 minutes. After sterile prep of the lumbar spine, the L5-S1 interspace was identified with the help of fluoroscopy. Local anesthetic was given by a 25 gauge 1.5 inch needle with 1% lidocaine in that space level. Thereafter, a 20 gauge Tuohy needle was introduced and advanced under fluoroscopy. The epidural space was accessed by using loss of resistance to air technique. The needle position was confirmed with AP and lateral view under fluoroscopy. Omnipaque 180 was injected in 1 mL volume. A good epidurogram was obtained.   Thereafter, dexamethasone 10 mg with normal saline of 5 mL in total volume of 6 mL was injected through the Tuohy needle. The needle was flushed with 1 mL lidocaine. The needle was withdrawn with the stylet intact in situ. The needle's tip was intact. The patient tolerated the procedure very well without significant immediate complication. The patient's back was cleaned and sterile dressing was applied. The patient was discharged with an instruction to a responsible adult after discharge criteria were met. The patient was advised to contact us should any problems happen. The patient was also informed to go to the emergency room immediately if experiencing severe numbness or weakness in the extremities or experiencing bowel or bladder incontinence. Complications: None. Follow up: The patient was followed in the pain clinic as needed basis.           Baron Orlando MD

## 2023-07-12 ENCOUNTER — APPOINTMENT (OUTPATIENT)
Dept: PHYSICAL THERAPY | Age: 68
End: 2023-07-12
Attending: PHYSICIAN ASSISTANT
Payer: COMMERCIAL

## 2023-07-12 ENCOUNTER — TELEPHONE (OUTPATIENT)
Dept: PAIN CLINIC | Facility: CLINIC | Age: 68
End: 2023-07-12

## 2023-07-12 NOTE — TELEPHONE ENCOUNTER
Courtesy called placed to patient for post procedure follow up. Patient stated no effect yet. Informed patient that soreness is to be expected after the procedure. Educated patient that it takes 3-5 days for the steroid to be effective and to allow adequate time for medication to work. Encouraged patient to alternate ice and heat and to take medications as prescribed. Pt verbalized understanding to call with any questions or concerns.       Procedure: LUMBAR INTERLAMINAR EPIDURAL INJECTION with sedation   Date: 07/11/23  Follow up Visit Scheduled:

## 2023-07-17 ENCOUNTER — TELEPHONE (OUTPATIENT)
Dept: ORTHOPEDICS CLINIC | Facility: CLINIC | Age: 68
End: 2023-07-17

## 2023-07-17 ENCOUNTER — OFFICE VISIT (OUTPATIENT)
Dept: ORTHOPEDICS CLINIC | Facility: CLINIC | Age: 68
End: 2023-07-17
Payer: COMMERCIAL

## 2023-07-17 VITALS — BODY MASS INDEX: 28.26 KG/M2 | WEIGHT: 206.38 LBS | HEIGHT: 71.5 IN

## 2023-07-17 DIAGNOSIS — M16.11 PRIMARY OSTEOARTHRITIS OF RIGHT HIP: Primary | ICD-10-CM

## 2023-07-17 PROCEDURE — 99204 OFFICE O/P NEW MOD 45 MIN: CPT | Performed by: ORTHOPAEDIC SURGERY

## 2023-07-17 PROCEDURE — 3008F BODY MASS INDEX DOCD: CPT | Performed by: ORTHOPAEDIC SURGERY

## 2023-07-17 NOTE — PROGRESS NOTES
SURGERY SCHEDULING SHEET    Jose Maria Mackenzie  9/8/1955  [id number]    Procedure: Right anterior total hip arthroplasty    CPT: 11642    Diagnosis: Right hip osteoarthritis    Anesthesia: Choice    Length of Surgery: 2 hours    Disposition: Inpatient    Instruments: Shiva JESSICA    Assist: If case scheduled on Thurs/Fri, then Pelon Brenner first assist. If case scheduled on Tues, then Johanna Manuel ([phone]) first assist. If Jamie Bond unavailable, then please communicate to Parrish Blanco/Janet/Angeles to cancel Memo's clinic for that day and have Parrish Garcia first assist. If Parrish Garcia unavailable, contact Trevon Brooks for first assist. If Ayala Bellniranjan and Shelby Crawford unavailable, then contact [facility] for first assist.    Pre-op Testing: CBC, CMP, PT/INR, PTT, TYPE AND SCREEN, and MRSA/MSSA THROUGH EDW PAT    Clearance: MEDICAL/PCP    Post op: 2 weeks postop appointment with Pelon Poon date specifics: Next available    Olga Andrea MD, [address] Orthopedic Surgery  Phone: [phone]  Fax: [phone]

## 2023-07-17 NOTE — TELEPHONE ENCOUNTER
Date of Surgery:  9/12/23     Post Op Appt:  9/27/23 @ 8AM     Case ID: [id number]     Notes:             SURGERY SCHEDULING SHEET     Landen Bullard  9/8/1955  [id number]     Procedure: Right anterior total hip arthroplasty     CPT: 07989     Diagnosis: Right hip osteoarthritis     Anesthesia: Choice     Length of Surgery: 2 hours     Disposition: Inpatient     Instruments: Shiva JESSICA     Assist: If case scheduled on Thurs/Fri, then Asher Aguilar first assist. If case scheduled on Tues, then Randall Adame ([phone]) first assist. If Danni Alanis unavailable, then please communicate to Papito Blanco/Janet/Angeles to cancel Lanse's clinic for that day and have Papito Alston first assist. If Papito Alston unavailable, contact Jamarcus Pena for first assist. If Danni Alanis and Hero Ortiz unavailable, then contact [facility] for first assist.     Pre-op Testing: CBC, CMP, PT/INR, PTT, TYPE AND SCREEN, and MRSA/MSSA THROUGH EDW PAT     Clearance: MEDICAL/PCP     Post op: 2 weeks postop appointment with Asher Aguilar     Surgery date specifics: Next available     Ila He MD, [address] [facility] Orthopedic Surgery  Phone: [phone]  Fax: [phone]

## 2023-07-18 ENCOUNTER — OFFICE VISIT (OUTPATIENT)
Dept: PHYSICAL THERAPY | Age: 68
End: 2023-07-18
Attending: PHYSICIAN ASSISTANT
Payer: COMMERCIAL

## 2023-07-18 PROCEDURE — 97140 MANUAL THERAPY 1/> REGIONS: CPT

## 2023-07-18 PROCEDURE — 97110 THERAPEUTIC EXERCISES: CPT

## 2023-07-18 NOTE — PROGRESS NOTES
Diagnosis:   Spinal stenosis of lumbar region without neurogenic claudication (M48.061)  Hip tightness (R29.898)  Weakness of right lower extremity (R29.898)  Acute pain of left knee (G96.670)      Referring Provider: Lisbeth Fontaine  Date of Evaluation:    6/20/2023    Precautions:  None Next MD visit:  N/S  Date of Surgery: n/a   Insurance Primary/Secondary: Vini Valdez INS / N/A     # Auth Visits: 60v limit, no auth             Progress Summary  Pt has attended 8 visits in Physical Therapy. Subjective: Pt reports he received the lumbar injection. First few days nothing - no change first 48 hours. Last two days relief of tightness but not pain. Pain still noted at varied intensity 4-8/10. Per Dr. Lennox Lima - injections or surgery. Pt would like to proceed with surgery. Pt reports he \"always feels better when he leaves here. \" Improvements: No spasms to the back, a little more flexible - can now clean himself (toileting). Pressure (in the back) has decreased. However needs Tylenol to sleep. Impairments: sitting > 40 minutes, walking, exercise, sleeping, donning R sock & shoe. Overall 25% improvement reported. Pain: 6-7/10      Objective: See Flowsheet for details  7/18/2023:  Observation: Improved upright standing posture noted    Palpation: increased tension & TTP R glutes/ piriformis; TTP R distal QL attachment iliac crest    AROM:  Hip PROM Lumbar   ER: R micheal restricted  IR: R mod restricted Flexion: Almost WNL in quantity, with no LBP, but + pain R glute referring long term anterior thigh     Strength/MMT:  Hip   Abduction: R 4/5     Gait: pt ambulates on level ground with improved upright posture maintained & less antalgic gait pattern      Assessment: Pt has completed 8/8 visits on initial PT POC. Despite continuing to have distal RLE symptoms, pt does report reduced lumbar symptoms (no spasms), improved flexibility including for wiping when toileting, & less pressure to the low back.  Pt would continue to benefit from skilled PT addressing functional limitations of sitting > 40 minutes, walking, exercise, sleeping, donning R sock & shoe in order for return to OF. Thank you. Goals: (to be met in 14 visits)  Pt will improve transversus abdominis recruitment to perform proper isometric contraction without requiring verbal or tactile cuing to promote advancement of therex   Pt will demonstrate good understanding of proper posture and body mechanics to decrease pain and improve spinal safety - improved  Pt will report the ability to tolerate sitting > 60 minutes with pain at worst 4/10 for driving & work duties. - improved  Pt will improve B hip abduction strength to 4+/5 for community ambulation - improved  Pt will improve R hip IR & ER ROM to mod restricted or better for ability to don R sock & shoe with improved ease - improved  Pt will report ability to sleep at night waking max of 1x for improved ability to focus & function daytime. - no change  Pt will be independent and compliant with comprehensive HEP to maintain progress achieved in PT     Plan: Continued efforts at improving lumbar & hip mobility; consider progressing core strength/ abdominal activation  Oswestry Disability Index Score  Score: 68 % (6/17/2023  7:10 AM)    Post Oswestry Disability Index Score  Post Score: 46 % (7/18/2023  4:36 PM)    22 % improvement    Continue skilled Physical Therapy 1-2 x/week or a total of 6 visits over a 90 day period. Treatment will include: Gait training, Manual Therapy, Mechanical Traction, Neuromuscular Re-education, Self-Care Home Management, Therapeutic Activities, Therapeutic Exercise, Home Exercise Program instruction and Modalities to include: Electrical stimulation (unattended)       Patient/Family/Caregiver was advised of these findings, precautions, and treatment options and has agreed to actively participate in planning and for this course of care.     Thank you for your referral. If you have any questions, please contact me at Dept: [phone]. Sincerely,  Electronically signed by therapist: Delisa Centeno PT     Physician's certification required:  Yes  Please co-sign or sign and return this letter via fax as soon as possible to [phone]. I certify the need for these services furnished under this plan of treatment and while under my care. X___________________________________________________ Date____________________    Certification From: 3/08/9463  To:10/16/2023     Date: 6/23/2023  TX#: 2/8 Date: 6/26/2023  TX#: 3/8 Date: 6/27/2023  TX#: 4/8 Date: 7/5/2023  TX#: 5/8 Date: 7/7/2023   Tx#: 6/8 Date: 7/10/2023  Tx#: 7/8 Date: 7/18/2023  Tx#: 8/14  Progress Note   Manual Therapy: 24'  R hip inf, lat mobs gr II-III/ MWM flex, IR, ER (supine)  R lumbar (emphasis L5-S1) unilateral distraction gr II-III (L sidelying - neutral)  STM R QL, LPSM (L sidelying)   Therex: 16'  Trial of lumbar positional distraction stretch over L bolster - defer  Lumbar decompressive stretch supine 90/90 position x 8' - instruction in parameters for adding to HEP - no increased peripheralization  Education: deferring prolonged walking (ext) as exercise for now focusing on more neutral to flexion based cardio with goal of incorporating as symptoms reduce.  Manual Therapy: 28'  R hip inf, lat mobs gr II-III/ MWM flex, IR, ER (supine)  R lumbar (emphasis L5-S1) unilateral distraction gr II-III (L sidelying - neutral)  STM R Piriformis, Glute Med, QL, LPSM (L sidelying)  Therex: 8'  LTR x 5'  PPT x 30 Manual Therapy: 25'  Supine:  Bilateral traction with LEs first over Swiss Ball->stepstool (90/90 position) with mobilization belt  RLE traction in OPP with mobilization belt  Test<>Re-test before & after manual Tx: see above  Therex: 15'  Hip Abduction Ana Cristina with Pilates Ring (purple) 5\" hold, 2x15 hooklying - add to HEP with [place] Shutters  Hip Adduction Ana Cristina with Pilates Ring (purple) 5\" hold, 2x15 hooklying - add to HEP with ball squeeze, folded pillow, or rolled blanket Manual Therapy: 25'  Supine:   RLE traction in OPP with mobilization belt  R hip inf, lat mobs gr II-III/ MWM flex, IR, ER (supine)  L Sidelying:  STM/ MFR R Piriformis, Glute Med  Therex: 15'  Doorway QL stretch: L; 3x30\" - instruction for HEP  Flat back stretch @ // bars 3x30\" - instruction for HEP  Instruction in self-performed RLE traction off bed or floor with belt/ towel/ sheet  Defer PPT for HEP Modalities: 15'  IFC Electrical Stimulation in supine 90/90 position with cold pack to lumbar spine (extra towel layer); 40% scan max of 15v. X 15' with check-ins regularly with pt throughout  Therex: 20'  Seated lumbar flexion stretch with Swiss Ball: lateral deviation R/L, fwd. X 5' total  Defer doorway QL stretch   Modified anaid hip flexor stretch tableside: with gentle manual OP for additional stretch to quad. Instruction to add to HEP - may use belt/ strap for additional stretch to simulate PT OP. X 5' total  Education: connection between lumbar & hip, including connection with Iliopsoas  Manual Therapy: 10'  STM/ MFR: R Iliopsoas, TFL (supine) Manual Therapy: 45'  L3-L5, sacrum central PA; trial R L5 (prone over 1 pillow)  Manual myofascial traction stretch lumbar in prone over 1 pillow  RLE traction in OPP with mobilization belt  R hip inf, lat mobs gr II-III/ MWM flex, IR, ER (supine)  STM/ MFR: R Piriformis, Glute Med     Therex: 17'  Checking in - status post lumbar injection.  POC/ DC planning - moving forward  Re-assessment (see above)  Manual Therapy: 21'  STM/ MFR: R Piriformis, Glute Med  Cross-hand R sidelying fascial stretch/ release  R hip inf, lat mobs gr II-III/ MWM flex, IR, ER (supine)         HEP:  PPT in hooklying, LTR.  6/23/2023: lumbar decompressive stretch supine 90/90 position  6/27/2023: hip abduction isometric with [place], hip adduction isometric with ball squeeze, folded pillow, or rolled blanket  7/5/2023: doorway QL stretch, flat back stretch, self-performed RLE traction.  Pt denied need for written handout  7/7/2023: modified anaid hip flexor stretch off side of bed    Charges: Manual x 2, Therex x 1       Total Timed Treatment: 40 min  Total Treatment Time: 40 min

## 2023-07-20 ENCOUNTER — TELEPHONE (OUTPATIENT)
Dept: ORTHOPEDICS CLINIC | Facility: CLINIC | Age: 68
End: 2023-07-20

## 2023-07-20 ENCOUNTER — TELEPHONE (OUTPATIENT)
Dept: FAMILY MEDICINE CLINIC | Facility: CLINIC | Age: 68
End: 2023-07-20

## 2023-07-20 DIAGNOSIS — M16.11 PRIMARY OSTEOARTHRITIS OF RIGHT HIP: Primary | ICD-10-CM

## 2023-07-20 DIAGNOSIS — Z01.818 PRE-OP TESTING: Primary | ICD-10-CM

## 2023-07-20 NOTE — TELEPHONE ENCOUNTER
Pt is having Rt anterior total hip arthroplasty on 9/12/23 with Dr Cathy Quintero  Pre op on 9/5/23 at 10    Needs   H&P  MEDICAL CLEARANCE  CBC W DIFF  CMP  PT/INR  PTT  TYPE AND SCREEN    Paperwork in triage in front of printer

## 2023-07-21 ENCOUNTER — OFFICE VISIT (OUTPATIENT)
Dept: PHYSICAL THERAPY | Age: 68
End: 2023-07-21
Attending: PHYSICIAN ASSISTANT
Payer: COMMERCIAL

## 2023-07-21 PROCEDURE — 97110 THERAPEUTIC EXERCISES: CPT

## 2023-07-21 NOTE — PROGRESS NOTES
Diagnosis:   Spinal stenosis of lumbar region without neurogenic claudication (M48.061)  Hip tightness (R29.898)  Weakness of right lower extremity (R29.898)  Acute pain of left knee (S30.155)      Referring Provider: Bharath Gil  Date of Evaluation:    6/20/2023    Precautions:  None Next MD visit:  N/S  R hip replacement scheduled for 9/12/2023  Date of Surgery: n/a   Insurance Primary/Secondary: Ale Camarena INS / N/A     # Auth Visits: 60v limit, no auth    Subjective: Pt scheduled hip replacement surgery for September 12th. Pt will get home health care for 2 wks post-op. The pain to the R low back/ glute referring to R thigh never goes away. But today can actually stand on it. Yesterday pain to stand - felt it was the hip not the back. Pain: 3-5/10      Objective: See Flowsheet for details  7/21/2023:  Flexibility: Quad L Mod Restricted, R Mod-Radhames Restricted      7/18/2023:  Observation: Improved upright standing posture noted    Palpation: increased tension & TTP R glutes/ piriformis; TTP R distal QL attachment iliac crest    AROM:  Hip PROM Lumbar   ER: R radhames restricted  IR: R mod restricted Flexion: Almost WNL in quantity, with no LBP, but + pain R glute referring half-way anterior thigh     Strength/MMT:  Hip   Abduction: R 4/5     Gait: pt ambulates on level ground with improved upright posture maintained & less antalgic gait pattern      Assessment: Although symptoms increased standing walking after prone activities, reduced symptoms noted post NuStep. Today progressed strengthening activity for pt with overall excellent response noted. Pt reported feeling \"worked out\" sufficiently at end of visit.       Goals: (to be met in 14 visits)  Pt will improve transversus abdominis recruitment to perform proper isometric contraction without requiring verbal or tactile cuing to promote advancement of therex   Pt will demonstrate good understanding of proper posture and body mechanics to decrease pain and improve spinal safety - improved  Pt will report the ability to tolerate sitting > 60 minutes with pain at worst 4/10 for driving & work duties. - improved  Pt will improve B hip abduction strength to 4+/5 for community ambulation - improved  Pt will improve R hip IR & ER ROM to mod restricted or better for ability to don R sock & shoe with improved ease - improved  Pt will report ability to sleep at night waking max of 1x for improved ability to focus & function daytime.  - no change  Pt will be independent and compliant with comprehensive HEP to maintain progress achieved in PT     Plan: Continued efforts at improving lumbar & hip mobility; consider progressing core strength/ abdominal activation  Oswestry Disability Index Score  Score: 68 % (6/17/2023  7:10 AM)    Post Oswestry Disability Index Score  Post Score: 46 % (7/18/2023  4:36 PM)    22 % improvement    Date: 6/26/2023  TX#: 3/8 Date: 6/27/2023  TX#: 4/8 Date: 7/5/2023  TX#: 5/8 Date: 7/7/2023   Tx#: 6/8 Date: 7/10/2023  Tx#: 7/8 Date: 7/18/2023  Tx#: 8/14  Progress Note Date: 7/21/2023  Tx#: 9/14     Manual Therapy: 35'  R hip inf, lat mobs gr II-III/ MWM flex, IR, ER (supine)  R lumbar (emphasis L5-S1) unilateral distraction gr II-III (L sidelying - neutral)  STM R Piriformis, Glute Med, QL, LPSM (L sidelying)  Therex: 8'  LTR x 5'  PPT x 30 Manual Therapy: 25'  Supine:  Bilateral traction with LEs first over Swiss Ball->stepstool (90/90 position) with mobilization belt  RLE traction in OPP with mobilization belt  Test<>Re-test before & after manual Tx: see above  Therex: 15'  Hip Abduction Ana Cristina with Pilates Ring (purple) 5\" hold, 2x15 hooklying - add to HEP with [place]   Hip Adduction Ana Cristina with Pilates Ring (purple) 5\" hold, 2x15 hooklying - add to HEP with ball squeeze, folded pillow, or rolled blanket Manual Therapy: 25'  Supine:   RLE traction in OPP with mobilization belt  R hip inf, lat mobs gr II-III/ MWM flex, IR, ER (supine)  L Sidelying:  STM/ MFR R Piriformis, Glute Med  Therex: 15'  Doorway QL stretch: L; 3x30\" - instruction for HEP  Flat back stretch @ // bars 3x30\" - instruction for HEP  Instruction in self-performed RLE traction off bed or floor with belt/ towel/ sheet  Defer PPT for HEP Modalities: 15'  IFC Electrical Stimulation in supine 90/90 position with cold pack to lumbar spine (extra towel layer); 40% scan max of 15v. X 15' with check-ins regularly with pt throughout  Therex: 20'  Seated lumbar flexion stretch with Swiss Ball: lateral deviation R/L, fwd. X 5' total  Defer doorway QL stretch   Modified anaid hip flexor stretch tableside: with gentle manual OP for additional stretch to quad. Instruction to add to HEP - may use belt/ strap for additional stretch to simulate PT OP. X 5' total  Education: connection between lumbar & hip, including connection with Iliopsoas  Manual Therapy: 10'  STM/ MFR: R Iliopsoas, TFL (supine) Manual Therapy: 45'  L3-L5, sacrum central PA; trial R L5 (prone over 1 pillow)  Manual myofascial traction stretch lumbar in prone over 1 pillow  RLE traction in OPP with mobilization belt  R hip inf, lat mobs gr II-III/ MWM flex, IR, ER (supine)  STM/ MFR: R Piriformis, Glute Med     Therex: 17'  Checking in - status post lumbar injection.  POC/ DC planning - moving forward  Re-assessment (see above)  Manual Therapy: 21'  STM/ MFR: R Piriformis, Glute Med  Cross-hand R sidelying fascial stretch/ release  R hip inf, lat mobs gr II-III/ MWM flex, IR, ER (supine)       Therex: 43'  Prone R femoral nerve flossing manual x 5'  Prone R hip PROM IR & ER x 5'  Prone R quad stretch over 1 rolled towel x 5' intermittent  NuStep L4 x 5' UE & LE  Shuttle B Leg Press 2x15 62#  Shuttle R SL Press 2x15 37#  Resisted lateral walkouts G x 10 ea  Standing resisted shld ext Y->G 2x15  Discussed POC/ Discharge Planning   HEP:  PPT in hooklying, LTR.  6/23/2023: lumbar decompressive stretch supine 90/90 position  6/27/2023: hip abduction isometric with [place] Caceres, hip adduction isometric with ball squeeze, folded pillow, or rolled blanket  7/5/2023: doorway QL stretch, flat back stretch, self-performed RLE traction. Pt denied need for written handout  7/7/2023: modified anaid hip flexor stretch off side of bed    Charges:  Therex x 3       Total Timed Treatment: 43 min  Total Treatment Time: 43 min

## 2023-07-24 ENCOUNTER — OFFICE VISIT (OUTPATIENT)
Dept: PHYSICAL THERAPY | Age: 68
End: 2023-07-24
Attending: PHYSICIAN ASSISTANT
Payer: COMMERCIAL

## 2023-07-24 PROCEDURE — 97110 THERAPEUTIC EXERCISES: CPT

## 2023-07-24 PROCEDURE — 97140 MANUAL THERAPY 1/> REGIONS: CPT

## 2023-07-24 NOTE — PROGRESS NOTES
Diagnosis:   Spinal stenosis of lumbar region without neurogenic claudication (M48.061)  Hip tightness (R29.898)  Weakness of right lower extremity (R29.898)  Acute pain of left knee (J85.194)      Referring Provider: Rocio Ndiaye  Date of Evaluation:    6/20/2023    Precautions:  None Next MD visit:  R hip replacement scheduled for 9/12/2023  Date of Surgery: n/a   Insurance Primary/Secondary: Bill Mason INS / N/A     # Auth Visits: 60v limit, no auth    Subjective: Pt reports he went to stretch lab Sat - worked the R hip a lot. - may d/c stretch lab. Did feel good in PT session - not sure if bothered hip? Or from stretch lab (less than 24 hours later). Today more sore to R hip - feels the joint. Back is doing better     Pain: 6-7/10 R hip      Objective: See Flowsheet for details  7/21/2023:  Flexibility: Quad L Mod Restricted, R Mod-Radhames Restricted      7/18/2023:  Observation: Improved upright standing posture noted    Palpation: increased tension & TTP R glutes/ piriformis; TTP R distal QL attachment iliac crest    AROM:  Hip PROM Lumbar   ER: R radhames restricted  IR: R mod restricted Flexion: Almost WNL in quantity, with no LBP, but + pain R glute referring shelter anterior thigh     Strength/MMT:  Hip   Abduction: R 4/5     Gait: pt ambulates on level ground with improved upright posture maintained & less antalgic gait pattern      Assessment: Pain 3-4/10 after manual therapy. Decreased tension to R piriformis & glutes than previously noted. No pain with B shuttle leg press with mild + pain to R posterolateral hip with SL but able to complete all sets & reps. Pt overall continued to feel better at end of visit vs start. Pt will self-monitor for symptoms after visit today.       Goals: (to be met in 14 visits)  Pt will improve transversus abdominis recruitment to perform proper isometric contraction without requiring verbal or tactile cuing to promote advancement of therex   Pt will demonstrate good understanding of proper posture and body mechanics to decrease pain and improve spinal safety - improved  Pt will report the ability to tolerate sitting > 60 minutes with pain at worst 4/10 for driving & work duties. - improved  Pt will improve B hip abduction strength to 4+/5 for community ambulation - improved  Pt will improve R hip IR & ER ROM to mod restricted or better for ability to don R sock & shoe with improved ease - improved  Pt will report ability to sleep at night waking max of 1x for improved ability to focus & function daytime. - no change  Pt will be independent and compliant with comprehensive HEP to maintain progress achieved in PT     Plan: Continued efforts at improving lumbar & hip mobility; consider progressing core strength/ abdominal activation  Oswestry Disability Index Score  Score: 68 % (6/17/2023  7:10 AM)    Post Oswestry Disability Index Score  Post Score: 46 % (7/18/2023  4:36 PM)    22 % improvement    Date: 7/5/2023  TX#: 5/8 Date: 7/7/2023   Tx#: 6/8 Date: 7/10/2023  Tx#: 7/8 Date: 7/18/2023  Tx#: 8/14  Progress Note Date: 7/21/2023  Tx#: 9/14   Date: 7/24/2023  Tx#: 10/14   Manual Therapy: 25'  Supine:   RLE traction in OPP with mobilization belt  R hip inf, lat mobs gr II-III/ MWM flex, IR, ER (supine)  L Sidelying:  STM/ MFR R Piriformis, Glute Med  Therex: 15'  Doorway QL stretch: L; 3x30\" - instruction for HEP  Flat back stretch @ // bars 3x30\" - instruction for HEP  Instruction in self-performed RLE traction off bed or floor with belt/ towel/ sheet  Defer PPT for HEP Modalities: 15'  IFC Electrical Stimulation in supine 90/90 position with cold pack to lumbar spine (extra towel layer); 40% scan max of 15v. X 15' with check-ins regularly with pt throughout  Therex: 20'  Seated lumbar flexion stretch with Swiss Ball: lateral deviation R/L, fwd. X 5' total  Defer doorway QL stretch   Modified anaid hip flexor stretch tableside: with gentle manual OP for additional stretch to quad.  Instruction to add to HEP - may use belt/ strap for additional stretch to simulate PT OP. X 5' total  Education: connection between lumbar & hip, including connection with Iliopsoas  Manual Therapy: 10'  STM/ MFR: R Iliopsoas, TFL (supine) Manual Therapy: 45'  L3-L5, sacrum central PA; trial R L5 (prone over 1 pillow)  Manual myofascial traction stretch lumbar in prone over 1 pillow  RLE traction in OPP with mobilization belt  R hip inf, lat mobs gr II-III/ MWM flex, IR, ER (supine)  STM/ MFR: R Piriformis, Glute Med     Therex: 17'  Checking in - status post lumbar injection. POC/ DC planning - moving forward  Re-assessment (see above)  Manual Therapy: 21'  STM/ MFR: R Piriformis, Glute Med  Cross-hand R sidelying fascial stretch/ release  R hip inf, lat mobs gr II-III/ MWM flex, IR, ER (supine)       Therex: 43'  Prone R femoral nerve flossing manual x 5'  Prone R hip PROM IR & ER x 5'  Prone R quad stretch over 1 rolled towel x 5' intermittent  NuStep L4 x 5' UE & LE  Shuttle B Leg Press 2x15 62#  Shuttle R SL Press 2x15 37#  Resisted lateral walkouts G x 10 ea  Standing resisted shld ext Y->G 2x15  Discussed POC/ Discharge Planning Therex: 16'  Shuttle B Leg Press 3x15 62#  Shuttle R SL Press 2x15 37#  Resisted lateral walkouts G x 10 ea  Manual Therapy: 25'  STM/ MFR: R Piriformis, Glute Med  R hip inf, lat mobs gr II-III/ MWM flex, IR, ER (supine)   HEP:  PPT in hooklying, LTR.  6/23/2023: lumbar decompressive stretch supine 90/90 position  6/27/2023: hip abduction isometric with Ronnal[place], hip adduction isometric with ball squeeze, folded pillow, or rolled blanket  7/5/2023: doorway QL stretch, flat back stretch, self-performed RLE traction. Pt denied need for written handout  7/7/2023: modified anaid hip flexor stretch off side of bed    Charges:  Therex x 1, Manual x 2       Total Timed Treatment: 40 min  Total Treatment Time: 40 min

## 2023-07-28 ENCOUNTER — OFFICE VISIT (OUTPATIENT)
Dept: PHYSICAL THERAPY | Age: 68
End: 2023-07-28
Attending: PHYSICIAN ASSISTANT
Payer: COMMERCIAL

## 2023-07-28 PROCEDURE — 97140 MANUAL THERAPY 1/> REGIONS: CPT

## 2023-07-28 PROCEDURE — 97110 THERAPEUTIC EXERCISES: CPT

## 2023-07-28 NOTE — PROGRESS NOTES
Diagnosis:   Spinal stenosis of lumbar region without neurogenic claudication (M48.061)  Hip tightness (R29.898)  Weakness of right lower extremity (R29.898)  Acute pain of left knee (Z69.147)      Referring Provider: Janeen Pulido  Date of Evaluation:    6/20/2023    Precautions:  None Next MD visit:  R hip replacement scheduled for 9/12/2023  Date of Surgery: n/a   Insurance Primary/Secondary: [employer] / N/A     # Auth Visits: 60v limit, no auth    Subjective: Pt reports back is better. Hip is the main issue with varying referral down anterior thigh stopping at anterior knee     Pain: 5-6/10 R hip      Objective: See Flowsheet for details  7/21/2023:  Flexibility: Quad L Mod Restricted, R Mod-Radhames Restricted      7/18/2023:  Observation: Improved upright standing posture noted    Palpation: increased tension & TTP R glutes/ piriformis; TTP R distal QL attachment iliac crest    AROM:  Hip PROM Lumbar   ER: R radhames restricted  IR: R mod restricted Flexion: Almost WNL in quantity, with no LBP, but + pain R glute referring shelter anterior thigh     Strength/MMT:  Hip   Abduction: R 4/5     Gait: pt ambulates on level ground with improved upright posture maintained & less antalgic gait pattern      Assessment: Pt reported pressure to R hip with resisted walking ex's but no increased pain. Relief with manual therapy especially use of tiger tail roller stick to R hip musculature today. Reduced pain reported at end of visit during ambulation. Goals: (to be met in 14 visits)  Pt will improve transversus abdominis recruitment to perform proper isometric contraction without requiring verbal or tactile cuing to promote advancement of therex   Pt will demonstrate good understanding of proper posture and body mechanics to decrease pain and improve spinal safety - improved  Pt will report the ability to tolerate sitting > 60 minutes with pain at worst 4/10 for driving & work duties.  - improved  Pt will improve B hip abduction strength to 4+/5 for community ambulation - improved  Pt will improve R hip IR & ER ROM to mod restricted or better for ability to don R sock & shoe with improved ease - improved  Pt will report ability to sleep at night waking max of 1x for improved ability to focus & function daytime. - no change  Pt will be independent and compliant with comprehensive HEP to maintain progress achieved in PT     Plan: Continued efforts at improving lumbar & hip mobility; consider progressing core strength/ abdominal activation  Oswestry Disability Index Score  Score: 68 % (6/17/2023  7:10 AM)    Post Oswestry Disability Index Score  Post Score: 46 % (7/18/2023  4:36 PM)    22 % improvement    Date: 7/5/2023  TX#: 5/8 Date: 7/7/2023   Tx#: 6/8 Date: 7/10/2023  Tx#: 7/8 Date: 7/18/2023  Tx#: 8/14  Progress Note Date: 7/21/2023  Tx#: 9/14   Date: 7/24/2023  Tx#: 10/14 Date: 7/28/2023  Tx#: 11/14   Manual Therapy: 25'  Supine:   RLE traction in OPP with mobilization belt  R hip inf, lat mobs gr II-III/ MWM flex, IR, ER (supine)  L Sidelying:  STM/ MFR R Piriformis, Glute Med  Therex: 15'  Doorway QL stretch: L; 3x30\" - instruction for HEP  Flat back stretch @ // bars 3x30\" - instruction for HEP  Instruction in self-performed RLE traction off bed or floor with belt/ towel/ sheet  Defer PPT for HEP Modalities: 15'  IFC Electrical Stimulation in supine 90/90 position with cold pack to lumbar spine (extra towel layer); 40% scan max of 15v. X 15' with check-ins regularly with pt throughout  Therex: 20'  Seated lumbar flexion stretch with Swiss Ball: lateral deviation R/L, fwd. X 5' total  Defer doorway QL stretch   Modified anaid hip flexor stretch tableside: with gentle manual OP for additional stretch to quad. Instruction to add to HEP - may use belt/ strap for additional stretch to simulate PT OP.  X 5' total  Education: connection between lumbar & hip, including connection with Iliopsoas  Manual Therapy: 10'  STM/ MFR: R Iliopsoas, TFL (supine) Manual Therapy: 45'  L3-L5, sacrum central PA; trial R L5 (prone over 1 pillow)  Manual myofascial traction stretch lumbar in prone over 1 pillow  RLE traction in OPP with mobilization belt  R hip inf, lat mobs gr II-III/ MWM flex, IR, ER (supine)  STM/ MFR: R Piriformis, Glute Med     Therex: 17'  Checking in - status post lumbar injection. POC/ DC planning - moving forward  Re-assessment (see above)  Manual Therapy: 21'  STM/ MFR: R Piriformis, Glute Med  Cross-hand R sidelying fascial stretch/ release  R hip inf, lat mobs gr II-III/ MWM flex, IR, ER (supine)       Therex: 43'  Prone R femoral nerve flossing manual x 5'  Prone R hip PROM IR & ER x 5'  Prone R quad stretch over 1 rolled towel x 5' intermittent  NuStep L4 x 5' UE & LE  Shuttle B Leg Press 2x15 62#  Shuttle R SL Press 2x15 37#  Resisted lateral walkouts G x 10 ea  Standing resisted shld ext Y->G 2x15  Discussed POC/ Discharge Planning Therex: 16'  Shuttle B Leg Press 3x15 62#  Shuttle R SL Press 2x15 37#  Resisted lateral walkouts G x 10 ea  Manual Therapy: 25'  STM/ MFR: R Piriformis, Glute Med  R hip inf, lat mobs gr II-III/ MWM flex, IR, ER (supine) Therex: 18'  NuStep L5 x 5' UE & LE  Resisted lateral walking Yellow TB cuff loop 4x50 ft  Resisted fwd-bwd walking Yellow TB cuff loop 4x50 ft  Manual Therapy: 24'  R hip inf, lat mobs gr II-III/ MWM flex, IR, ER (supine)  STM Tiger Tail roller stick to R TFL, quad, piriformis, glute med   HEP:  PPT in hooklying, LTR.  6/23/2023: lumbar decompressive stretch supine 90/90 position  6/27/2023: hip abduction isometric with Charlott Jeremie, hip adduction isometric with ball squeeze, folded pillow, or rolled blanket  7/5/2023: doorway QL stretch, flat back stretch, self-performed RLE traction. Pt denied need for written handout  7/7/2023: modified anaid hip flexor stretch off side of bed    Charges:  Therex x 1, Manual x 2       Total Timed Treatment: 42 min  Total Treatment Time: 42 min

## 2023-07-31 ENCOUNTER — OFFICE VISIT (OUTPATIENT)
Dept: PHYSICAL THERAPY | Age: 68
End: 2023-07-31
Attending: PHYSICIAN ASSISTANT
Payer: COMMERCIAL

## 2023-07-31 PROCEDURE — 97140 MANUAL THERAPY 1/> REGIONS: CPT

## 2023-07-31 PROCEDURE — 97110 THERAPEUTIC EXERCISES: CPT

## 2023-07-31 NOTE — PROGRESS NOTES
Diagnosis:   Spinal stenosis of lumbar region without neurogenic claudication (M48.061)  Hip tightness (R29.898)  Weakness of right lower extremity (R29.898)  Acute pain of left knee (U70.846)      Referring Provider: Jacki Cheema  Date of Evaluation:    6/20/2023    Precautions:  None Next MD visit:  R hip replacement scheduled for 9/12/2023  Date of Surgery: n/a   Insurance Primary/Secondary: [employer] INS / N/A     # Auth Visits: 60v limit, no auth    Subjective: Pt reports R hip pain, wondering if from doing too much with exercise. LBP better     Pain: 5-6/10 R hip      Objective: See Flowsheet for details  7/21/2023:  Flexibility: Quad L Mod Restricted, R Mod-Radhames Restricted      7/18/2023:  Observation: Improved upright standing posture noted    Palpation: increased tension & TTP R glutes/ piriformis; TTP R distal QL attachment iliac crest    AROM:  Hip PROM Lumbar   ER: R radhames restricted  IR: R mod restricted Flexion: Almost WNL in quantity, with no LBP, but + pain R glute referring correction anterior thigh     Strength/MMT:  Hip   Abduction: R 4/5     Gait: pt ambulates on level ground with improved upright posture maintained & less antalgic gait pattern      Assessment: Pt presented with increased antalgia & slight anterior trunk lean noted at start of visit which reduced by end of visit & pt reporting both relief during use of Tiger Tail roller stick to R hip; decreased \"compression\" feeling to the R hip with amb in clinic post-table activities. Able to progress on shuttle leg press by 6#. Cues req with s/l hip abduction raises to ensure no compensatory mm involvement.       Goals: (to be met in 14 visits)  Pt will improve transversus abdominis recruitment to perform proper isometric contraction without requiring verbal or tactile cuing to promote advancement of therex   Pt will demonstrate good understanding of proper posture and body mechanics to decrease pain and improve spinal safety - improved  Pt will report the ability to tolerate sitting > 60 minutes with pain at worst 4/10 for driving & work duties. - improved  Pt will improve B hip abduction strength to 4+/5 for community ambulation - improved  Pt will improve R hip IR & ER ROM to mod restricted or better for ability to don R sock & shoe with improved ease - improved  Pt will report ability to sleep at night waking max of 1x for improved ability to focus & function daytime. - no change  Pt will be independent and compliant with comprehensive HEP to maintain progress achieved in PT     Plan: 2 more visits scheduled - may consider d/c to HEP at this time  Oswestry Disability Index Score  Score: 68 % (6/17/2023  7:10 AM)    Post Oswestry Disability Index Score  Post Score: 46 % (7/18/2023  4:36 PM)    22 % improvement    Date: 7/7/2023   Tx#: 6/8 Date: 7/10/2023  Tx#: 7/8 Date: 7/18/2023  Tx#: 8/14  Progress Note Date: 7/21/2023  Tx#: 9/14   Date: 7/24/2023  Tx#: 10/14 Date: 7/28/2023  Tx#: 11/14 Date: 7/31/2023  Tx#: 12/14   Modalities: 15'  IFC Electrical Stimulation in supine 90/90 position with cold pack to lumbar spine (extra towel layer); 40% scan max of 15v. X 15' with check-ins regularly with pt throughout  Therex: 20'  Seated lumbar flexion stretch with Swiss Ball: lateral deviation R/L, fwd. X 5' total  Defer doorway QL stretch   Modified anaid hip flexor stretch tableside: with gentle manual OP for additional stretch to quad. Instruction to add to HEP - may use belt/ strap for additional stretch to simulate PT OP.  X 5' total  Education: connection between lumbar & hip, including connection with Iliopsoas  Manual Therapy: 10'  STM/ MFR: R Iliopsoas, TFL (supine) Manual Therapy: 45'  L3-L5, sacrum central PA; trial R L5 (prone over 1 pillow)  Manual myofascial traction stretch lumbar in prone over 1 pillow  RLE traction in OPP with mobilization belt  R hip inf, lat mobs gr II-III/ MWM flex, IR, ER (supine)  STM/ MFR: R Piriformis, Glute Med     Therex: 17'  Checking in - status post lumbar injection. POC/ DC planning - moving forward  Re-assessment (see above)  Manual Therapy: 21'  STM/ MFR: R Piriformis, Glute Med  Cross-hand R sidelying fascial stretch/ release  R hip inf, lat mobs gr II-III/ MWM flex, IR, ER (supine)       Therex: 43'  Prone R femoral nerve flossing manual x 5'  Prone R hip PROM IR & ER x 5'  Prone R quad stretch over 1 rolled towel x 5' intermittent  NuStep L4 x 5' UE & LE  Shuttle B Leg Press 2x15 62#  Shuttle R SL Press 2x15 37#  Resisted lateral walkouts G x 10 ea  Standing resisted shld ext Y->G 2x15  Discussed POC/ Discharge Planning Therex: 16'  Shuttle B Leg Press 3x15 62#  Shuttle R SL Press 2x15 37#  Resisted lateral walkouts G x 10 ea  Manual Therapy: 25'  STM/ MFR: R Piriformis, Glute Med  R hip inf, lat mobs gr II-III/ MWM flex, IR, ER (supine) Therex: 18'  NuStep L5 x 5' UE & LE  Resisted lateral walking Yellow TB cuff loop 4x50 ft  Resisted fwd-bwd walking Yellow TB cuff loop 4x50 ft  Manual Therapy: 24'  R hip inf, lat mobs gr II-III/ MWM flex, IR, ER (supine)  STM Tiger Tail roller stick to R TFL, quad, piriformis, glute med Therex: 26'  NuStep L6 x 5' UE & LE  S/L hip abduction 2x10  Seated R hip ER with PT anchor x 20 YTB  TKE with yellow ball at wall 3\" x 20  Shuttle B Leg Press 3x15 62#->67#  Manual Therapy: 24'  R hip inf, lat mobs gr II-III/ MWM flex, IR, ER (supine)  STM Tiger Tail roller stick to R TFL, quad, piriformis, glute med   HEP:  PPT in hooklying, LTR.  6/23/2023: lumbar decompressive stretch supine 90/90 position  6/27/2023: hip abduction isometric with Murlene Ceasar, hip adduction isometric with ball squeeze, folded pillow, or rolled blanket  7/5/2023: doorway QL stretch, flat back stretch, self-performed RLE traction. Pt denied need for written handout  7/7/2023: modified anaid hip flexor stretch off side of bed    Charges:  Therex x 2, Manual x 2       Total Timed Treatment: 50 min  Total Treatment Time: 50 min

## 2023-08-02 ENCOUNTER — APPOINTMENT (OUTPATIENT)
Dept: PHYSICAL THERAPY | Age: 68
End: 2023-08-02
Attending: PHYSICIAN ASSISTANT
Payer: COMMERCIAL

## 2023-08-04 ENCOUNTER — OFFICE VISIT (OUTPATIENT)
Dept: PHYSICAL THERAPY | Age: 68
End: 2023-08-04
Attending: PHYSICIAN ASSISTANT
Payer: COMMERCIAL

## 2023-08-04 PROCEDURE — 97140 MANUAL THERAPY 1/> REGIONS: CPT

## 2023-08-04 PROCEDURE — 97110 THERAPEUTIC EXERCISES: CPT

## 2023-08-04 NOTE — PROGRESS NOTES
Diagnosis:   Spinal stenosis of lumbar region without neurogenic claudication (M48.061)  Hip tightness (R29.898)  Weakness of right lower extremity (R29.898)  Acute pain of left knee (A86.076)      Referring Provider: Rocio Ndiaye  Date of Evaluation:    6/20/2023    Precautions:  None Next MD visit:  R hip replacement scheduled for 9/12/2023  Date of Surgery: n/a   Insurance Primary/Secondary: [employer] INS / N/A     # Auth Visits: 60v limit, no auth    Subjective: Pt reports the pain comes & goes. Been engaging in normal ex's last two days including from doc for operation. Walking 3/4 mi, not easy, took advil, morning felt a little better. Movement seems to be helping. Back doing ok. R knee hurting- coming from hip - felt tight like it is getting squeezed     Pain: 3-4/10 R hip      Objective: See Flowsheet for details  8/4/2023  Restricted R tib-fem post/ IR & R pat-fem medial glide      7/21/2023:  Flexibility: Quad L Mod Restricted, R Mod-Radhames Restricted      7/18/2023:  Observation: Improved upright standing posture noted    Palpation: increased tension & TTP R glutes/ piriformis; TTP R distal QL attachment iliac crest    AROM:  Hip PROM Lumbar   ER: R radhames restricted  IR: R mod restricted Flexion: Almost WNL in quantity, with no LBP, but + pain R glute referring assisted anterior thigh     Strength/MMT:  Hip   Abduction: R 4/5     Gait: pt ambulates on level ground with improved upright posture maintained & less antalgic gait pattern      Assessment: Pt reported reduced tightness feeling to knee after manual techniques provided. Educated in kinetic chain connection between distal knee joint & pt's hip symptoms. Sufficient challenge with hip abduction & extension in standing with [employer].       Goals: (to be met in 14 visits)  Pt will improve transversus abdominis recruitment to perform proper isometric contraction without requiring verbal or tactile cuing to promote advancement of therex   Pt will demonstrate good understanding of proper posture and body mechanics to decrease pain and improve spinal safety - improved  Pt will report the ability to tolerate sitting > 60 minutes with pain at worst 4/10 for driving & work duties. - improved  Pt will improve B hip abduction strength to 4+/5 for community ambulation - improved  Pt will improve R hip IR & ER ROM to mod restricted or better for ability to don R sock & shoe with improved ease - improved  Pt will report ability to sleep at night waking max of 1x for improved ability to focus & function daytime. - no change  Pt will be independent and compliant with comprehensive HEP to maintain progress achieved in PT     Plan: 1 more visit scheduled - tentative d/c to HEP at this time    Pt advised he may reach out to PT when off from therapy next week if he has any questions/ concerns - next visit confirmed      Oswestry Disability Index Score  Score: 68 % (6/17/2023  7:10 AM)    Post Oswestry Disability Index Score  Post Score: 46 % (7/18/2023  4:36 PM)    22 % improvement    Date: 7/7/2023   Tx#: 6/8 Date: 7/10/2023  Tx#: 7/8 Date: 7/18/2023  Tx#: 8/14  Progress Note Date: 7/21/2023  Tx#: 9/14   Date: 7/24/2023  Tx#: 10/14 Date: 7/28/2023  Tx#: 11/14 Date: 7/31/2023  Tx#: 12/14 Date: 8/4/2023  Tx#: 13/14   Modalities: 15'  IFC Electrical Stimulation in supine 90/90 position with cold pack to lumbar spine (extra towel layer); 40% scan max of 15v. X 15' with check-ins regularly with pt throughout  Therex: 20'  Seated lumbar flexion stretch with Swiss Ball: lateral deviation R/L, fwd. X 5' total  Defer doorway QL stretch   Modified anadi hip flexor stretch tableside: with gentle manual OP for additional stretch to quad. Instruction to add to HEP - may use belt/ strap for additional stretch to simulate PT OP.  X 5' total  Education: connection between lumbar & hip, including connection with Iliopsoas  Manual Therapy: 10'  STM/ MFR: R Iliopsoas, TFL (supine) Manual Therapy: 45'  L3-L5, sacrum central PA; trial R L5 (prone over 1 pillow)  Manual myofascial traction stretch lumbar in prone over 1 pillow  RLE traction in OPP with mobilization belt  R hip inf, lat mobs gr II-III/ MWM flex, IR, ER (supine)  STM/ MFR: R Piriformis, Glute Med     Therex: 17'  Checking in - status post lumbar injection.  POC/ DC planning - moving forward  Re-assessment (see above)  Manual Therapy: 21'  STM/ MFR: R Piriformis, Glute Med  Cross-hand R sidelying fascial stretch/ release  R hip inf, lat mobs gr II-III/ MWM flex, IR, ER (supine)       Therex: 43'  Prone R femoral nerve flossing manual x 5'  Prone R hip PROM IR & ER x 5'  Prone R quad stretch over 1 rolled towel x 5' intermittent  NuStep L4 x 5' UE & LE  Shuttle B Leg Press 2x15 62#  Shuttle R SL Press 2x15 37#  Resisted lateral walkouts G x 10 ea  Standing resisted shld ext Y->G 2x15  Discussed POC/ Discharge Planning Therex: 16'  Shuttle B Leg Press 3x15 62#  Shuttle R SL Press 2x15 37#  Resisted lateral walkouts G x 10 ea  Manual Therapy: 25'  STM/ MFR: R Piriformis, Glute Med  R hip inf, lat mobs gr II-III/ MWM flex, IR, ER (supine) Therex: 18'  NuStep L5 x 5' UE & LE  Resisted lateral walking Yellow TB cuff loop 4x50 ft  Resisted fwd-bwd walking Yellow TB cuff loop 4x50 ft  Manual Therapy: 24'  R hip inf, lat mobs gr II-III/ MWM flex, IR, ER (supine)  STM Tiger Tail roller stick to R TFL, quad, piriformis, glute med Therex: 26'  NuStep L6 x 5' UE & LE  S/L hip abduction 2x10  Seated R hip ER with PT anchor x 20 YTB  TKE with yellow ball at wall 3\" x 20  Shuttle B Leg Press 3x15 62#->67#  Manual Therapy: 24'  R hip inf, lat mobs gr II-III/ MWM flex, IR, ER (supine)  STM Tiger Tail roller stick to R TFL, quad, piriformis, glute med Therex: 22'  Standing hip abduction with RTB 2x15 ea  Standing hip extension with RTB 2x15 ea  Standing hip ER toña at wall with yellow ball 2x15 ea  Standing gastroc stretch @ slantboard 3x30\" B  D/C Planning  Manual Therapy: 24'  Joint mobs:  R tib-fem post/ IR gr I-III in supine  R pat-fem med gr I-III in L sidelying pillow between knees   HEP:  PPT in hooklying, LTR.  6/23/2023: lumbar decompressive stretch supine 90/90 position  6/27/2023: hip abduction isometric with Hurman Caceres, hip adduction isometric with ball squeeze, folded pillow, or rolled blanket  7/5/2023: doorway QL stretch, flat back stretch, self-performed RLE traction. Pt denied need for written handout  7/7/2023: modified anaid hip flexor stretch off side of bed    Charges:  Therex x 1, Manual x 2       Total Timed Treatment: 46 min  Total Treatment Time: 46 min

## 2023-08-08 ENCOUNTER — APPOINTMENT (OUTPATIENT)
Dept: PHYSICAL THERAPY | Age: 68
End: 2023-08-08
Attending: PHYSICIAN ASSISTANT
Payer: COMMERCIAL

## 2023-08-15 ENCOUNTER — OFFICE VISIT (OUTPATIENT)
Dept: PHYSICAL THERAPY | Age: 68
End: 2023-08-15
Attending: PHYSICIAN ASSISTANT
Payer: COMMERCIAL

## 2023-08-15 PROCEDURE — 97110 THERAPEUTIC EXERCISES: CPT

## 2023-08-15 NOTE — PROGRESS NOTES
Diagnosis:   Spinal stenosis of lumbar region without neurogenic claudication (M48.061)  Hip tightness (R29.898)  Weakness of right lower extremity (R29.898)  Acute pain of left knee (L58.788)      Referring Provider: Dean Ford Date of Evaluation:    6/20/2023    Precautions:  None Next MD visit:  R hip replacement scheduled for 9/12/2023  Date of Surgery: n/a   Insurance Primary/Secondary: Doreen Bautista INS / N/A     # Auth Visits: 60v limit, no auth     Discharge Summary  Pt has attended 14 visits in Physical Therapy. Subjective: Pt reports increased hip pain with walking in airport & on plane flight. Wondering if the ex's he got in the binder for his hip before surgery are bothering him. R hip continues to limit pt in standing & stairclimbing tolerance. Affects the R thigh. Limited in initial walking after sitting. R hip bothers him with laying on R side for sleeping. Has not had nerve pain in a while. Back is better. Pt reports 100% improvement in lumbar symptoms - \"I feel I don't have a back issue anymore. \" However R hip is getting worse. Pain: 0/10 Lumbar; 2-3/10 R hip      Objective: See Flowsheet for details  8/15/2023  Observation: Improved upright standing posture noted    Palpation: increased tension R glutes/ piriformis    ROM:  Hip PROM   ER: R micheal restricted  IR: R mod restricted     Strength/MMT:  Hip   Abduction: R 4+/5 limited by pain, L 5/5     Gait: pt ambulates on level ground with improved upright posture maintained with decreased stance phase & early/ shortened push off & mild antalgia RLE      Assessment: Pt has completed 14 visits of skilled PT. Continues with R hip pain though lumbar symptoms have essentially resolved. Pt & PT are in agreement for discharge for pt to work on [id number] ex's from surgeon before planned R JESSICA scheduled for 9/12/2023.  Pt advised to discuss with surgeon re: prehab ex's that he has been advised to complete due to pt reporting possibly having increased pain from these. Pt plans to discuss with surgeon postop PT plans in order to facilitate smooth transition to outpatient PT if deemed appropriate by surgeon. Pt verbalizes understanding w/ HEP & ability to self-alter as appropriate. Pt advised to contact PT if questions/ concerns arise while performing HEP (Pt is aware PT will be out of office from 8/17 & returning 8/28 & will contact central scheduling if any issues arise during this time). Pt is in agreement with discharge plans. Thank you. Goals: (to be met in 14 visits)  Pt will improve transversus abdominis recruitment to perform proper isometric contraction without requiring verbal or tactile cuing to promote advancement of therex - improved  Pt will demonstrate good understanding of proper posture and body mechanics to decrease pain and improve spinal safety - met  Pt will report the ability to tolerate sitting > 60 minutes with pain at worst 4/10 for driving & work duties. - improved  Pt will improve B hip abduction strength to 4+/5 for community ambulation - met  Pt will improve R hip IR & ER ROM to mod restricted or better for ability to don R sock & shoe with improved ease - improved  Pt will report ability to sleep at night waking max of 1x for improved ability to focus & function daytime. - no change  Pt will be independent and compliant with comprehensive HEP to maintain progress achieved in PT - met    Plan: Discharge to HEP. Oswestry Disability Index Score  Score: 68 % (6/17/2023  7:10 AM)    Post Oswestry Disability Index Score  Post Score: 18 % (8/15/2023 12:18 PM)    50 % improvement    ( ^ Survey results are specifically for the lumbar spine not hip)    Patient/Family/Caregiver was advised of these findings, precautions, and treatment options and has agreed to actively participate in planning and for this course of care. Thank you for your referral. If you have any questions, please contact me at Dept: [phone].     Sincerely,  Electronically signed by therapist: Tyler Gray PT     Physician's certification required:  No  Please co-sign or sign and return this letter via fax as soon as possible to [phone]. I certify the need for these services furnished under this plan of treatment and while under my care.     X___________________________________________________ Date____________________    Certification From: 3/43/1748  To:11/13/2023      Date: 7/21/2023  Tx#: 9/14   Date: 7/24/2023  Tx#: 10/14 Date: 7/28/2023  Tx#: 11/14 Date: 7/31/2023  Tx#: 12/14 Date: 8/4/2023  Tx#: 13/14 Date: 8/15/2023  Tx#: 14/14  Discharge   Therex: 37'  Prone R femoral nerve flossing manual x 5'  Prone R hip PROM IR & ER x 5'  Prone R quad stretch over 1 rolled towel x 5' intermittent  NuStep L4 x 5' UE & LE  Shuttle B Leg Press 2x15 62#  Shuttle R SL Press 2x15 37#  Resisted lateral walkouts G x 10 ea  Standing resisted shld ext Y->G 2x15  Discussed POC/ Discharge Planning Therex: 16'  Shuttle B Leg Press 3x15 62#  Shuttle R SL Press 2x15 37#  Resisted lateral walkouts G x 10 ea  Manual Therapy: 25'  STM/ MFR: R Piriformis, Glute Med  R hip inf, lat mobs gr II-III/ MWM flex, IR, ER (supine) Therex: 18'  NuStep L5 x 5' UE & LE  Resisted lateral walking Yellow TB cuff loop 4x50 ft  Resisted fwd-bwd walking Yellow TB cuff loop 4x50 ft  Manual Therapy: 24'  R hip inf, lat mobs gr II-III/ MWM flex, IR, ER (supine)  STM Tiger Tail roller stick to R TFL, quad, piriformis, glute med Therex: 26'  NuStep L6 x 5' UE & LE  S/L hip abduction 2x10  Seated R hip ER with PT anchor x 20 YTB  TKE with yellow ball at wall 3\" x 20  Shuttle B Leg Press 3x15 62#->67#  Manual Therapy: 24'  R hip inf, lat mobs gr II-III/ MWM flex, IR, ER (supine)  STM Tiger Tail roller stick to R TFL, quad, piriformis, glute med Therex: 22'  Standing hip abduction with RTB 2x15 ea  Standing hip extension with RTB 2x15 ea  Standing hip ER toña at wall with yellow ball 2x15 ea  Standing gastroc stretch @ slantboard 3x30\" B  D/C Planning  Manual Therapy: 24'  Joint mobs:  R tib-fem post/ IR gr I-III in supine  R pat-fem med gr I-III in L sidelying pillow between knees Therex: 44'  Exercise recs - see above. Follow up with surgeon re: prehab ex's  Discharge Instructions  HEP review  Re-assessment: see above  Goal status completion  MASON outcome survey   HEP:  PPT in hooklying, LTR.  6/23/2023: lumbar decompressive stretch supine 90/90 position  6/27/2023: hip abduction isometric with Ila Pouch, hip adduction isometric with ball squeeze, folded pillow, or rolled blanket  7/5/2023: doorway QL stretch, flat back stretch, self-performed RLE traction. Pt denied need for written handout  7/7/2023: modified anaid hip flexor stretch off side of bed    Charges:  Therex x 3       Total Timed Treatment: 39 min  Total Treatment Time: 39 min

## 2023-08-18 ENCOUNTER — LABORATORY ENCOUNTER (OUTPATIENT)
Dept: LAB | Age: 68
End: 2023-08-18
Attending: ORTHOPAEDIC SURGERY
Payer: COMMERCIAL

## 2023-08-18 ENCOUNTER — EKG ENCOUNTER (OUTPATIENT)
Dept: LAB | Age: 68
End: 2023-08-18
Attending: ORTHOPAEDIC SURGERY
Payer: COMMERCIAL

## 2023-08-18 DIAGNOSIS — M16.11 OSTEOARTHRITIS OF RIGHT HIP: ICD-10-CM

## 2023-08-18 DIAGNOSIS — Z01.818 PRE-OP TESTING: ICD-10-CM

## 2023-08-18 LAB
ALBUMIN SERPL-MCNC: 3.9 G/DL (ref 3.4–5)
ALBUMIN/GLOB SERPL: 1.2 {RATIO} (ref 1–2)
ALP LIVER SERPL-CCNC: 62 U/L
ALT SERPL-CCNC: 32 U/L
ANION GAP SERPL CALC-SCNC: 5 MMOL/L (ref 0–18)
ANTIBODY SCREEN: NEGATIVE
APTT PPP: 27.5 SECONDS (ref 23.3–35.6)
AST SERPL-CCNC: 21 U/L (ref 15–37)
BASOPHILS # BLD AUTO: 0.03 X10(3) UL (ref 0–0.2)
BASOPHILS NFR BLD AUTO: 0.4 %
BILIRUB SERPL-MCNC: 0.6 MG/DL (ref 0.1–2)
BUN BLD-MCNC: 19 MG/DL (ref 7–18)
CALCIUM BLD-MCNC: 9.3 MG/DL (ref 8.5–10.1)
CHLORIDE SERPL-SCNC: 105 MMOL/L (ref 98–112)
CO2 SERPL-SCNC: 28 MMOL/L (ref 21–32)
CREAT BLD-MCNC: 1.21 MG/DL
EGFRCR SERPLBLD CKD-EPI 2021: 65 ML/MIN/1.73M2 (ref 60–?)
EOSINOPHIL # BLD AUTO: 0.15 X10(3) UL (ref 0–0.7)
EOSINOPHIL NFR BLD AUTO: 2.2 %
ERYTHROCYTE [DISTWIDTH] IN BLOOD BY AUTOMATED COUNT: 12.5 %
GLOBULIN PLAS-MCNC: 3.2 G/DL (ref 2.8–4.4)
GLUCOSE BLD-MCNC: 89 MG/DL (ref 70–99)
HCT VFR BLD AUTO: 40.6 %
HGB BLD-MCNC: 14.1 G/DL
IMM GRANULOCYTES # BLD AUTO: 0.02 X10(3) UL (ref 0–1)
IMM GRANULOCYTES NFR BLD: 0.3 %
INR BLD: 1 (ref 0.85–1.16)
LYMPHOCYTES # BLD AUTO: 1.55 X10(3) UL (ref 1–4)
LYMPHOCYTES NFR BLD AUTO: 23.1 %
MCH RBC QN AUTO: 29 PG (ref 26–34)
MCHC RBC AUTO-ENTMCNC: 34.7 G/DL (ref 31–37)
MCV RBC AUTO: 83.5 FL
MONOCYTES # BLD AUTO: 0.56 X10(3) UL (ref 0.1–1)
MONOCYTES NFR BLD AUTO: 8.3 %
NEUTROPHILS # BLD AUTO: 4.41 X10 (3) UL (ref 1.5–7.7)
NEUTROPHILS # BLD AUTO: 4.41 X10(3) UL (ref 1.5–7.7)
NEUTROPHILS NFR BLD AUTO: 65.7 %
OSMOLALITY SERPL CALC.SUM OF ELEC: 288 MOSM/KG (ref 275–295)
PLATELET # BLD AUTO: 197 10(3)UL (ref 150–450)
POTASSIUM SERPL-SCNC: 3.9 MMOL/L (ref 3.5–5.1)
PROT SERPL-MCNC: 7.1 G/DL (ref 6.4–8.2)
PROTHROMBIN TIME: 13.2 SECONDS (ref 11.6–14.8)
RBC # BLD AUTO: 4.86 X10(6)UL
RH BLOOD TYPE: POSITIVE
SODIUM SERPL-SCNC: 138 MMOL/L (ref 136–145)
WBC # BLD AUTO: 6.7 X10(3) UL (ref 4–11)

## 2023-08-18 PROCEDURE — 85730 THROMBOPLASTIN TIME PARTIAL: CPT

## 2023-08-18 PROCEDURE — 87081 CULTURE SCREEN ONLY: CPT

## 2023-08-18 PROCEDURE — 85025 COMPLETE CBC W/AUTO DIFF WBC: CPT

## 2023-08-18 PROCEDURE — 80053 COMPREHEN METABOLIC PANEL: CPT

## 2023-08-18 PROCEDURE — 36415 COLL VENOUS BLD VENIPUNCTURE: CPT

## 2023-08-18 PROCEDURE — 93005 ELECTROCARDIOGRAM TRACING: CPT

## 2023-08-18 PROCEDURE — 85610 PROTHROMBIN TIME: CPT

## 2023-08-18 PROCEDURE — 93010 ELECTROCARDIOGRAM REPORT: CPT | Performed by: INTERNAL MEDICINE

## 2023-08-18 PROCEDURE — 86901 BLOOD TYPING SEROLOGIC RH(D): CPT

## 2023-08-18 PROCEDURE — 86850 RBC ANTIBODY SCREEN: CPT

## 2023-08-18 PROCEDURE — 86900 BLOOD TYPING SEROLOGIC ABO: CPT

## 2023-08-19 ENCOUNTER — PATIENT MESSAGE (OUTPATIENT)
Dept: FAMILY MEDICINE CLINIC | Facility: CLINIC | Age: 68
End: 2023-08-19

## 2023-08-19 LAB
ATRIAL RATE: 64 BPM
P AXIS: 40 DEGREES
P-R INTERVAL: 174 MS
Q-T INTERVAL: 442 MS
QRS DURATION: 112 MS
QTC CALCULATION (BEZET): 455 MS
R AXIS: 39 DEGREES
T AXIS: 41 DEGREES
VENTRICULAR RATE: 64 BPM

## 2023-08-29 ENCOUNTER — ORDER TRANSCRIPTION (OUTPATIENT)
Dept: PHYSICAL THERAPY | Facility: HOSPITAL | Age: 68
End: 2023-08-29

## 2023-08-29 DIAGNOSIS — M16.11 OSTEOARTHRITIS OF RIGHT HIP: Primary | ICD-10-CM

## 2023-08-30 ENCOUNTER — TELEPHONE (OUTPATIENT)
Dept: PHYSICAL THERAPY | Facility: HOSPITAL | Age: 68
End: 2023-08-30

## 2023-08-31 ENCOUNTER — PATIENT MESSAGE (OUTPATIENT)
Dept: ORTHOPEDICS CLINIC | Facility: CLINIC | Age: 68
End: 2023-08-31

## 2023-09-01 ENCOUNTER — MED REC SCAN ONLY (OUTPATIENT)
Dept: ORTHOPEDICS CLINIC | Facility: CLINIC | Age: 68
End: 2023-09-01

## 2023-09-05 ENCOUNTER — OFFICE VISIT (OUTPATIENT)
Dept: FAMILY MEDICINE CLINIC | Facility: CLINIC | Age: 68
End: 2023-09-05
Payer: COMMERCIAL

## 2023-09-05 VITALS
DIASTOLIC BLOOD PRESSURE: 70 MMHG | RESPIRATION RATE: 16 BRPM | BODY MASS INDEX: 28.26 KG/M2 | SYSTOLIC BLOOD PRESSURE: 130 MMHG | OXYGEN SATURATION: 97 % | WEIGHT: 206.38 LBS | HEIGHT: 71.5 IN | HEART RATE: 66 BPM

## 2023-09-05 DIAGNOSIS — Z01.818 PREOP EXAMINATION: Primary | ICD-10-CM

## 2023-09-05 DIAGNOSIS — I10 ESSENTIAL HYPERTENSION: ICD-10-CM

## 2023-09-05 DIAGNOSIS — M16.11 ARTHRITIS OF RIGHT HIP: ICD-10-CM

## 2023-09-05 DIAGNOSIS — I47.1 SVT (SUPRAVENTRICULAR TACHYCARDIA) (HCC): ICD-10-CM

## 2023-09-05 PROCEDURE — 3075F SYST BP GE 130 - 139MM HG: CPT | Performed by: FAMILY MEDICINE

## 2023-09-05 PROCEDURE — 99214 OFFICE O/P EST MOD 30 MIN: CPT | Performed by: FAMILY MEDICINE

## 2023-09-05 PROCEDURE — 3008F BODY MASS INDEX DOCD: CPT | Performed by: FAMILY MEDICINE

## 2023-09-05 PROCEDURE — 3078F DIAST BP <80 MM HG: CPT | Performed by: FAMILY MEDICINE

## 2023-09-08 ENCOUNTER — TELEPHONE (OUTPATIENT)
Dept: CASE MANAGEMENT | Facility: HOSPITAL | Age: 68
End: 2023-09-08

## 2023-09-11 ENCOUNTER — ANESTHESIA EVENT (OUTPATIENT)
Dept: SURGERY | Facility: HOSPITAL | Age: 68
End: 2023-09-11
Payer: COMMERCIAL

## 2023-09-12 ENCOUNTER — APPOINTMENT (OUTPATIENT)
Dept: GENERAL RADIOLOGY | Facility: HOSPITAL | Age: 68
End: 2023-09-12
Attending: ORTHOPAEDIC SURGERY
Payer: COMMERCIAL

## 2023-09-12 ENCOUNTER — ANESTHESIA (OUTPATIENT)
Dept: SURGERY | Facility: HOSPITAL | Age: 68
End: 2023-09-12
Payer: COMMERCIAL

## 2023-09-12 ENCOUNTER — HOSPITAL ENCOUNTER (OUTPATIENT)
Facility: HOSPITAL | Age: 68
Discharge: HOME HEALTH | End: 2023-09-13
Attending: ORTHOPAEDIC SURGERY | Admitting: ORTHOPAEDIC SURGERY
Payer: COMMERCIAL

## 2023-09-12 DIAGNOSIS — M16.11 OSTEOARTHRITIS OF RIGHT HIP: Primary | ICD-10-CM

## 2023-09-12 DIAGNOSIS — M16.11 PRIMARY OSTEOARTHRITIS OF RIGHT HIP: ICD-10-CM

## 2023-09-12 PROCEDURE — 27130 TOTAL HIP ARTHROPLASTY: CPT | Performed by: ORTHOPAEDIC SURGERY

## 2023-09-12 PROCEDURE — 0SR903A REPLACEMENT OF RIGHT HIP JOINT WITH CERAMIC SYNTHETIC SUBSTITUTE, UNCEMENTED, OPEN APPROACH: ICD-10-PCS | Performed by: ORTHOPAEDIC SURGERY

## 2023-09-12 PROCEDURE — 76000 FLUOROSCOPY <1 HR PHYS/QHP: CPT | Performed by: ORTHOPAEDIC SURGERY

## 2023-09-12 PROCEDURE — 72170 X-RAY EXAM OF PELVIS: CPT | Performed by: ORTHOPAEDIC SURGERY

## 2023-09-12 DEVICE — IMPLANTABLE DEVICE: Type: IMPLANTABLE DEVICE | Site: HIP | Status: FUNCTIONAL

## 2023-09-12 DEVICE — IMPLANTABLE DEVICE
Type: IMPLANTABLE DEVICE | Site: HIP | Status: FUNCTIONAL
Brand: ECHO BI-METRIC MICROPLASTY HIP SYSTEM

## 2023-09-12 DEVICE — IMPLANTABLE DEVICE
Type: IMPLANTABLE DEVICE | Site: HIP | Status: FUNCTIONAL
Brand: G7® VIVACIT-E®

## 2023-09-12 DEVICE — IMPLANTABLE DEVICE
Type: IMPLANTABLE DEVICE | Site: HIP | Status: FUNCTIONAL
Brand: G7® ACETABULAR SYSTEM

## 2023-09-12 DEVICE — IMPLANTABLE DEVICE
Type: IMPLANTABLE DEVICE | Site: HIP | Status: FUNCTIONAL
Brand: BIOLOX® OPTION

## 2023-09-12 DEVICE — IMPLANTABLE DEVICE
Type: IMPLANTABLE DEVICE | Site: HIP | Status: FUNCTIONAL
Brand: BIOLOX® DELTA OPTION

## 2023-09-12 RX ORDER — CEFAZOLIN SODIUM/WATER 2 G/20 ML
2 SYRINGE (ML) INTRAVENOUS ONCE
Status: COMPLETED | OUTPATIENT
Start: 2023-09-12 | End: 2023-09-12

## 2023-09-12 RX ORDER — ACETAMINOPHEN 500 MG
1000 TABLET ORAL ONCE
Status: DISCONTINUED | OUTPATIENT
Start: 2023-09-12 | End: 2023-09-12 | Stop reason: HOSPADM

## 2023-09-12 RX ORDER — AMLODIPINE BESYLATE 5 MG/1
5 TABLET ORAL DAILY
Status: DISCONTINUED | OUTPATIENT
Start: 2023-09-13 | End: 2023-09-13

## 2023-09-12 RX ORDER — ONDANSETRON 2 MG/ML
4 INJECTION INTRAMUSCULAR; INTRAVENOUS EVERY 6 HOURS PRN
Status: DISCONTINUED | OUTPATIENT
Start: 2023-09-12 | End: 2023-09-12 | Stop reason: HOSPADM

## 2023-09-12 RX ORDER — ACETAMINOPHEN 500 MG
1000 TABLET ORAL EVERY 8 HOURS
Status: DISCONTINUED | OUTPATIENT
Start: 2023-09-12 | End: 2023-09-13

## 2023-09-12 RX ORDER — OXYCODONE HYDROCHLORIDE 5 MG/1
5 TABLET ORAL EVERY 4 HOURS PRN
Status: DISCONTINUED | OUTPATIENT
Start: 2023-09-12 | End: 2023-09-13

## 2023-09-12 RX ORDER — MELATONIN
325
Status: DISCONTINUED | OUTPATIENT
Start: 2023-09-13 | End: 2023-09-13

## 2023-09-12 RX ORDER — SENNOSIDES 8.6 MG
17.2 TABLET ORAL NIGHTLY
Status: DISCONTINUED | OUTPATIENT
Start: 2023-09-12 | End: 2023-09-13

## 2023-09-12 RX ORDER — ASPIRIN 81 MG/1
81 TABLET ORAL 2 TIMES DAILY
Status: DISCONTINUED | OUTPATIENT
Start: 2023-09-12 | End: 2023-09-13

## 2023-09-12 RX ORDER — TRANEXAMIC ACID 10 MG/ML
1000 INJECTION, SOLUTION INTRAVENOUS ONCE
Status: DISCONTINUED | OUTPATIENT
Start: 2023-09-12 | End: 2023-09-12 | Stop reason: HOSPADM

## 2023-09-12 RX ORDER — ONDANSETRON 2 MG/ML
4 INJECTION INTRAMUSCULAR; INTRAVENOUS EVERY 6 HOURS PRN
Status: DISCONTINUED | OUTPATIENT
Start: 2023-09-12 | End: 2023-09-13

## 2023-09-12 RX ORDER — HYDROMORPHONE HYDROCHLORIDE 1 MG/ML
INJECTION, SOLUTION INTRAMUSCULAR; INTRAVENOUS; SUBCUTANEOUS
Status: COMPLETED
Start: 2023-09-12 | End: 2023-09-12

## 2023-09-12 RX ORDER — ENEMA 19; 7 G/133ML; G/133ML
1 ENEMA RECTAL ONCE AS NEEDED
Status: DISCONTINUED | OUTPATIENT
Start: 2023-09-12 | End: 2023-09-13

## 2023-09-12 RX ORDER — DIPHENHYDRAMINE HYDROCHLORIDE 50 MG/ML
25 INJECTION INTRAMUSCULAR; INTRAVENOUS ONCE AS NEEDED
Status: ACTIVE | OUTPATIENT
Start: 2023-09-12 | End: 2023-09-12

## 2023-09-12 RX ORDER — HYDROMORPHONE HYDROCHLORIDE 1 MG/ML
0.2 INJECTION, SOLUTION INTRAMUSCULAR; INTRAVENOUS; SUBCUTANEOUS EVERY 2 HOUR PRN
Status: DISCONTINUED | OUTPATIENT
Start: 2023-09-12 | End: 2023-09-13

## 2023-09-12 RX ORDER — NALOXONE HYDROCHLORIDE 0.4 MG/ML
0.08 INJECTION, SOLUTION INTRAMUSCULAR; INTRAVENOUS; SUBCUTANEOUS AS NEEDED
Status: DISCONTINUED | OUTPATIENT
Start: 2023-09-12 | End: 2023-09-12 | Stop reason: HOSPADM

## 2023-09-12 RX ORDER — HYDROMORPHONE HYDROCHLORIDE 1 MG/ML
0.6 INJECTION, SOLUTION INTRAMUSCULAR; INTRAVENOUS; SUBCUTANEOUS EVERY 5 MIN PRN
Status: DISCONTINUED | OUTPATIENT
Start: 2023-09-12 | End: 2023-09-12 | Stop reason: HOSPADM

## 2023-09-12 RX ORDER — DEXAMETHASONE SODIUM PHOSPHATE 10 MG/ML
8 INJECTION, SOLUTION INTRAMUSCULAR; INTRAVENOUS ONCE
Status: COMPLETED | OUTPATIENT
Start: 2023-09-13 | End: 2023-09-13

## 2023-09-12 RX ORDER — HYDROMORPHONE HYDROCHLORIDE 1 MG/ML
0.4 INJECTION, SOLUTION INTRAMUSCULAR; INTRAVENOUS; SUBCUTANEOUS EVERY 2 HOUR PRN
Status: DISCONTINUED | OUTPATIENT
Start: 2023-09-12 | End: 2023-09-13

## 2023-09-12 RX ORDER — BISACODYL 10 MG
10 SUPPOSITORY, RECTAL RECTAL
Status: DISCONTINUED | OUTPATIENT
Start: 2023-09-12 | End: 2023-09-13

## 2023-09-12 RX ORDER — SODIUM CHLORIDE, SODIUM LACTATE, POTASSIUM CHLORIDE, CALCIUM CHLORIDE 600; 310; 30; 20 MG/100ML; MG/100ML; MG/100ML; MG/100ML
INJECTION, SOLUTION INTRAVENOUS CONTINUOUS
Status: DISCONTINUED | OUTPATIENT
Start: 2023-09-12 | End: 2023-09-12 | Stop reason: HOSPADM

## 2023-09-12 RX ORDER — BUPIVACAINE HYDROCHLORIDE 7.5 MG/ML
INJECTION, SOLUTION INTRASPINAL AS NEEDED
Status: DISCONTINUED | OUTPATIENT
Start: 2023-09-12 | End: 2023-09-12 | Stop reason: SURG

## 2023-09-12 RX ORDER — ACETAMINOPHEN 325 MG/1
650 TABLET ORAL ONCE
Status: COMPLETED | OUTPATIENT
Start: 2023-09-12 | End: 2023-09-12

## 2023-09-12 RX ORDER — LIDOCAINE HYDROCHLORIDE 10 MG/ML
INJECTION, SOLUTION EPIDURAL; INFILTRATION; INTRACAUDAL; PERINEURAL AS NEEDED
Status: DISCONTINUED | OUTPATIENT
Start: 2023-09-12 | End: 2023-09-12 | Stop reason: SURG

## 2023-09-12 RX ORDER — TRAMADOL HYDROCHLORIDE 50 MG/1
50 TABLET ORAL EVERY 6 HOURS SCHEDULED
Status: DISCONTINUED | OUTPATIENT
Start: 2023-09-12 | End: 2023-09-13

## 2023-09-12 RX ORDER — DOCUSATE SODIUM 100 MG/1
100 CAPSULE, LIQUID FILLED ORAL 2 TIMES DAILY
Status: DISCONTINUED | OUTPATIENT
Start: 2023-09-12 | End: 2023-09-13

## 2023-09-12 RX ORDER — HYDROMORPHONE HYDROCHLORIDE 1 MG/ML
0.2 INJECTION, SOLUTION INTRAMUSCULAR; INTRAVENOUS; SUBCUTANEOUS EVERY 5 MIN PRN
Status: DISCONTINUED | OUTPATIENT
Start: 2023-09-12 | End: 2023-09-12 | Stop reason: HOSPADM

## 2023-09-12 RX ORDER — ACETAMINOPHEN 500 MG
1000 TABLET ORAL ONCE AS NEEDED
Status: DISCONTINUED | OUTPATIENT
Start: 2023-09-12 | End: 2023-09-12 | Stop reason: HOSPADM

## 2023-09-12 RX ORDER — HYDROMORPHONE HYDROCHLORIDE 1 MG/ML
0.4 INJECTION, SOLUTION INTRAMUSCULAR; INTRAVENOUS; SUBCUTANEOUS EVERY 5 MIN PRN
Status: DISCONTINUED | OUTPATIENT
Start: 2023-09-12 | End: 2023-09-12 | Stop reason: HOSPADM

## 2023-09-12 RX ORDER — HYDROCODONE BITARTRATE AND ACETAMINOPHEN 5; 325 MG/1; MG/1
2 TABLET ORAL ONCE AS NEEDED
Status: DISCONTINUED | OUTPATIENT
Start: 2023-09-12 | End: 2023-09-12 | Stop reason: HOSPADM

## 2023-09-12 RX ORDER — SODIUM CHLORIDE, SODIUM LACTATE, POTASSIUM CHLORIDE, CALCIUM CHLORIDE 600; 310; 30; 20 MG/100ML; MG/100ML; MG/100ML; MG/100ML
INJECTION, SOLUTION INTRAVENOUS CONTINUOUS
Status: DISCONTINUED | OUTPATIENT
Start: 2023-09-12 | End: 2023-09-13

## 2023-09-12 RX ORDER — METOCLOPRAMIDE HYDROCHLORIDE 5 MG/ML
10 INJECTION INTRAMUSCULAR; INTRAVENOUS EVERY 8 HOURS PRN
Status: DISCONTINUED | OUTPATIENT
Start: 2023-09-12 | End: 2023-09-12 | Stop reason: HOSPADM

## 2023-09-12 RX ORDER — DIPHENHYDRAMINE HYDROCHLORIDE 50 MG/ML
12.5 INJECTION INTRAMUSCULAR; INTRAVENOUS EVERY 4 HOURS PRN
Status: DISCONTINUED | OUTPATIENT
Start: 2023-09-12 | End: 2023-09-13

## 2023-09-12 RX ORDER — METOCLOPRAMIDE HYDROCHLORIDE 5 MG/ML
10 INJECTION INTRAMUSCULAR; INTRAVENOUS EVERY 8 HOURS PRN
Status: DISCONTINUED | OUTPATIENT
Start: 2023-09-12 | End: 2023-09-13

## 2023-09-12 RX ORDER — HYDROMORPHONE HYDROCHLORIDE 1 MG/ML
INJECTION, SOLUTION INTRAMUSCULAR; INTRAVENOUS; SUBCUTANEOUS
Status: DISCONTINUED
Start: 2023-09-12 | End: 2023-09-12 | Stop reason: WASHOUT

## 2023-09-12 RX ORDER — MIDAZOLAM HYDROCHLORIDE 1 MG/ML
INJECTION INTRAMUSCULAR; INTRAVENOUS AS NEEDED
Status: DISCONTINUED | OUTPATIENT
Start: 2023-09-12 | End: 2023-09-12 | Stop reason: SURG

## 2023-09-12 RX ORDER — HYDROCODONE BITARTRATE AND ACETAMINOPHEN 5; 325 MG/1; MG/1
1 TABLET ORAL ONCE AS NEEDED
Status: DISCONTINUED | OUTPATIENT
Start: 2023-09-12 | End: 2023-09-12 | Stop reason: HOSPADM

## 2023-09-12 RX ORDER — KETOROLAC TROMETHAMINE 30 MG/ML
15 INJECTION, SOLUTION INTRAMUSCULAR; INTRAVENOUS EVERY 6 HOURS
Status: COMPLETED | OUTPATIENT
Start: 2023-09-12 | End: 2023-09-13

## 2023-09-12 RX ORDER — CEFAZOLIN SODIUM/WATER 2 G/20 ML
2 SYRINGE (ML) INTRAVENOUS EVERY 8 HOURS
Status: COMPLETED | OUTPATIENT
Start: 2023-09-12 | End: 2023-09-13

## 2023-09-12 RX ORDER — OXYCODONE HYDROCHLORIDE 5 MG/1
2.5 TABLET ORAL EVERY 4 HOURS PRN
Status: DISCONTINUED | OUTPATIENT
Start: 2023-09-12 | End: 2023-09-13

## 2023-09-12 RX ORDER — POLYETHYLENE GLYCOL 3350 17 G/17G
17 POWDER, FOR SOLUTION ORAL DAILY PRN
Status: DISCONTINUED | OUTPATIENT
Start: 2023-09-12 | End: 2023-09-13

## 2023-09-12 RX ORDER — ACETAMINOPHEN 325 MG/1
TABLET ORAL
Status: COMPLETED
Start: 2023-09-12 | End: 2023-09-12

## 2023-09-12 RX ORDER — DIPHENHYDRAMINE HCL 25 MG
25 CAPSULE ORAL EVERY 4 HOURS PRN
Status: DISCONTINUED | OUTPATIENT
Start: 2023-09-12 | End: 2023-09-13

## 2023-09-12 RX ADMIN — LIDOCAINE HYDROCHLORIDE 50 MG: 10 INJECTION, SOLUTION EPIDURAL; INFILTRATION; INTRACAUDAL; PERINEURAL at 14:16:00

## 2023-09-12 RX ADMIN — MIDAZOLAM HYDROCHLORIDE 2 MG: 1 INJECTION INTRAMUSCULAR; INTRAVENOUS at 14:10:00

## 2023-09-12 RX ADMIN — BUPIVACAINE HYDROCHLORIDE 1.4 ML: 7.5 INJECTION, SOLUTION INTRASPINAL at 14:14:00

## 2023-09-12 RX ADMIN — CEFAZOLIN SODIUM/WATER 2 G: 2 G/20 ML SYRINGE (ML) INTRAVENOUS at 14:17:00

## 2023-09-12 RX ADMIN — SODIUM CHLORIDE, SODIUM LACTATE, POTASSIUM CHLORIDE, CALCIUM CHLORIDE: 600; 310; 30; 20 INJECTION, SOLUTION INTRAVENOUS at 16:46:00

## 2023-09-13 VITALS
HEART RATE: 61 BPM | SYSTOLIC BLOOD PRESSURE: 130 MMHG | RESPIRATION RATE: 18 BRPM | BODY MASS INDEX: 27.93 KG/M2 | OXYGEN SATURATION: 92 % | DIASTOLIC BLOOD PRESSURE: 65 MMHG | TEMPERATURE: 98 F | HEIGHT: 71.5 IN | WEIGHT: 204 LBS

## 2023-09-13 PROCEDURE — 99024 POSTOP FOLLOW-UP VISIT: CPT | Performed by: PHYSICIAN ASSISTANT

## 2023-09-13 RX ORDER — TRAMADOL HYDROCHLORIDE 50 MG/1
50 TABLET ORAL EVERY 8 HOURS
Qty: 45 TABLET | Refills: 0 | Status: SHIPPED | OUTPATIENT
Start: 2023-09-13 | End: 2023-09-28

## 2023-09-13 RX ORDER — CELECOXIB 200 MG/1
200 CAPSULE ORAL
Qty: 30 CAPSULE | Refills: 0 | Status: SHIPPED | OUTPATIENT
Start: 2023-09-13

## 2023-09-13 RX ORDER — ACETAMINOPHEN 500 MG
1000 TABLET ORAL EVERY 8 HOURS
Qty: 90 TABLET | Refills: 0 | Status: SHIPPED | OUTPATIENT
Start: 2023-09-13 | End: 2023-09-28

## 2023-09-13 RX ORDER — OXYCODONE HYDROCHLORIDE 5 MG/1
TABLET ORAL EVERY 4 HOURS PRN
Qty: 30 TABLET | Refills: 0 | Status: SHIPPED | OUTPATIENT
Start: 2023-09-13

## 2023-09-13 RX ORDER — PSEUDOEPHEDRINE HCL 30 MG
100 TABLET ORAL 2 TIMES DAILY PRN
Qty: 30 CAPSULE | Refills: 0 | Status: SHIPPED | OUTPATIENT
Start: 2023-09-13

## 2023-09-13 RX ORDER — ASPIRIN 81 MG/1
81 TABLET ORAL 2 TIMES DAILY
Qty: 80 TABLET | Refills: 0 | Status: SHIPPED | OUTPATIENT
Start: 2023-09-13 | End: 2023-10-23

## 2023-09-14 ENCOUNTER — PATIENT MESSAGE (OUTPATIENT)
Dept: ORTHOPEDICS CLINIC | Facility: CLINIC | Age: 68
End: 2023-09-14

## 2023-09-14 DIAGNOSIS — Z96.641 STATUS POST RIGHT HIP REPLACEMENT: Primary | ICD-10-CM

## 2023-09-15 NOTE — TELEPHONE ENCOUNTER
DOS 9/12/23  Next appt 9/27/23    Pt asking if okay to restart supplements: fish oil, mg2+, VitC, Zn, biotin, protein drink w/nutrrients    Future Appointments   Date Time Provider Esthela Marquez   9/19/2023 11:00 AM Lisandra Moreno MD [facility]   9/26/2023  5:15 PM [place]a [place], PT [facility] [place]   9/27/2023  8:00 AM Aleyda Young PA-C [facility]   10/2/2023 11:45 AM Cyna [place], PT [facility] [place]   10/4/2023  9:30 AM Cyna [place], PT [facility] [place]   10/9/2023 10:15 AM Cyna [place], PT [facility] [place]   10/11/2023 11:00 AM [facility] [place]   10/16/2023 10:15 AM [facility] [place]   10/18/2023  1:15 PM Cyna [place], PT [facility] [place]   10/23/2023 10:15 AM Cyntha [place], PT [facility] [place]   10/25/2023  3:00 PM [facility], PT [facility] [place]   10/30/2023 10:15 AM Cyna [place], PT [facility] [place]   11/1/2023  3:00 PM [facility], PT [facility] [place]   11/6/2023 10:15 AM Cyntha [place], PT [facility]

## 2023-09-15 NOTE — TELEPHONE ENCOUNTER
From: Eliazar Johns  To: Aimee Ramírez  Sent: 9/14/2023 2:23 PM CDT  Subject: Vitamins/supplements     Hi Doctor   Based on the medication you have me on may I resume taking the following:Fish oil, Magnesium,vitamin C, D3,zinc, biotin and a plant based protein drink that includes Calcium,iron and potassium.     Thank you

## 2023-09-19 ENCOUNTER — OFFICE VISIT (OUTPATIENT)
Dept: FAMILY MEDICINE CLINIC | Facility: CLINIC | Age: 68
End: 2023-09-19
Payer: COMMERCIAL

## 2023-09-19 VITALS
RESPIRATION RATE: 16 BRPM | WEIGHT: 210 LBS | SYSTOLIC BLOOD PRESSURE: 132 MMHG | BODY MASS INDEX: 29.4 KG/M2 | DIASTOLIC BLOOD PRESSURE: 58 MMHG | HEIGHT: 71 IN | HEART RATE: 66 BPM

## 2023-09-19 DIAGNOSIS — Z96.641 STATUS POST HIP REPLACEMENT, RIGHT: Primary | ICD-10-CM

## 2023-09-19 PROCEDURE — 3078F DIAST BP <80 MM HG: CPT | Performed by: FAMILY MEDICINE

## 2023-09-19 PROCEDURE — 1111F DSCHRG MED/CURRENT MED MERGE: CPT | Performed by: FAMILY MEDICINE

## 2023-09-19 PROCEDURE — 99213 OFFICE O/P EST LOW 20 MIN: CPT | Performed by: FAMILY MEDICINE

## 2023-09-19 PROCEDURE — 3008F BODY MASS INDEX DOCD: CPT | Performed by: FAMILY MEDICINE

## 2023-09-19 PROCEDURE — 3075F SYST BP GE 130 - 139MM HG: CPT | Performed by: FAMILY MEDICINE

## 2023-09-19 RX ORDER — SENNOSIDES 8.6 MG/1
TABLET ORAL
COMMUNITY
Start: 2023-09-13

## 2023-09-19 RX ORDER — HYDROCODONE BITARTRATE AND ACETAMINOPHEN 5; 325 MG/1; MG/1
1-2 TABLET ORAL NIGHTLY PRN
Qty: 20 TABLET | Refills: 0 | Status: SHIPPED | OUTPATIENT
Start: 2023-09-19

## 2023-09-19 NOTE — TELEPHONE ENCOUNTER
DOS 9/12/23  Next appt 9/27/23    Pt reports that oxycodone keeps him awake at night, requesting alternative to try. Takes tramadol and tylenol during day, states these 2 meds do not control the worsening pain at night. Pended norco though he may have same s/e?

## 2023-09-21 ENCOUNTER — TELEPHONE (OUTPATIENT)
Dept: PHYSICAL THERAPY | Facility: HOSPITAL | Age: 68
End: 2023-09-21

## 2023-09-22 RX ORDER — TRAMADOL HYDROCHLORIDE 50 MG/1
50 TABLET ORAL EVERY 8 HOURS
Qty: 21 TABLET | Refills: 0 | Status: SHIPPED | OUTPATIENT
Start: 2023-09-22 | End: 2023-09-29

## 2023-09-22 NOTE — TELEPHONE ENCOUNTER
Tramadol pending  - insurance only covered 7 days of medication   - last script was filled with only 21 pills rather than 45 as prescribed. New script for 7 days pending. Tramadol  DOS: 09/12/23  Last OV: 07/17/23  Last refill date: 09/13/23     #/refills: 0  Upcoming appt:    Future Appointments   Date Time Provider Esthela Marquez   9/26/2023  5:15 PM Daina [place], PT [facility] [place]   9/27/2023  8:00 AM Marky Harley PA-C [facility]   10/2/2023 11:45 AM Daina [place], PT [facility] [place]   10/4/2023  9:30 AM D[place]a [place], PT [facility] [place]   10/9/2023 10:15 AM D[place]a [place], PT [facility] [place]   10/11/2023 11:00 AM D[place]a [place], PT [facility] [place]   10/16/2023 10:15 AM Daina [place], PT [facility] [place]   10/18/2023  1:15 PM D[place]a [place], PT [facility] [place]   10/23/2023 10:15 AM Daina [place], PT [facility] [place]   10/25/2023  3:00 PM Daina [place], PT [facility] [place]   10/30/2023 10:15 AM Daina [place], PT [facility] [place]   11/1/2023  3:00 PM Daina [place], PT [facility] [place]   11/6/2023 10:15 AM Daina [place], PT [facility] [place]

## 2023-09-22 NOTE — TELEPHONE ENCOUNTER
Called patient and explained that he got 7 days worth of pills due to insurance coverage. He states he has not gotten the Norco that was previously prescribed. He states he took himself off the oxycodone due to hallucinations. He will follow up with pharmacy on [place] and tramadol.

## 2023-09-25 ENCOUNTER — TELEPHONE (OUTPATIENT)
Dept: ORTHOPEDICS CLINIC | Facility: CLINIC | Age: 68
End: 2023-09-25

## 2023-09-25 NOTE — TELEPHONE ENCOUNTER
[facility] sent and approval fax for Tramadol. Approved from 09/22/23 to 10/22/23.

## 2023-09-26 ENCOUNTER — OFFICE VISIT (OUTPATIENT)
Dept: PHYSICAL THERAPY | Age: 68
End: 2023-09-26
Attending: ORTHOPAEDIC SURGERY
Payer: COMMERCIAL

## 2023-09-26 ENCOUNTER — TELEPHONE (OUTPATIENT)
Dept: ORTHOPEDICS CLINIC | Facility: CLINIC | Age: 68
End: 2023-09-26

## 2023-09-26 DIAGNOSIS — M16.11 OSTEOARTHRITIS OF RIGHT HIP: Primary | ICD-10-CM

## 2023-09-26 PROCEDURE — 97161 PT EVAL LOW COMPLEX 20 MIN: CPT | Performed by: PHYSICAL THERAPIST

## 2023-09-26 PROCEDURE — 97110 THERAPEUTIC EXERCISES: CPT | Performed by: PHYSICAL THERAPIST

## 2023-09-26 NOTE — TELEPHONE ENCOUNTER
PT order is placed at 1st post-op visit which is tomorrow. Per protocol as previously stated by Dr Kin Dennis.

## 2023-09-26 NOTE — TELEPHONE ENCOUNTER
PT is calling for new order for POST OP rehab. The order from 8.29.23 is a transcribed order.  Please place new order

## 2023-09-27 ENCOUNTER — OFFICE VISIT (OUTPATIENT)
Dept: ORTHOPEDICS CLINIC | Facility: CLINIC | Age: 68
End: 2023-09-27
Payer: COMMERCIAL

## 2023-09-27 ENCOUNTER — PATIENT MESSAGE (OUTPATIENT)
Dept: ORTHOPEDICS CLINIC | Facility: CLINIC | Age: 68
End: 2023-09-27

## 2023-09-27 DIAGNOSIS — Z96.641 STATUS POST RIGHT HIP REPLACEMENT: Primary | ICD-10-CM

## 2023-09-27 PROCEDURE — 1111F DSCHRG MED/CURRENT MED MERGE: CPT | Performed by: PHYSICIAN ASSISTANT

## 2023-09-27 PROCEDURE — 99024 POSTOP FOLLOW-UP VISIT: CPT | Performed by: PHYSICIAN ASSISTANT

## 2023-09-27 NOTE — PROGRESS NOTES
[facility] Progress Note    Subjective: Patient returns to clinic 2 weeks status post right anterior total hip arthroplasty performed on 9/12/23. They have been taking Tylenol and Celebrex for pain control. They continue to take aspirin for DVT prophylaxis. They are overall satisfied with their progress. Denies any fevers, chills, night sweats. No redness or drainage from the incision concerning for infection. He is ambulating independently most of the time and with a cane when outside the home. Objective: Patient is seated comfortably in the exam chair. Alert and oriented. Nonlabored breathing. Grossly neurologically intact. Incision is clean, dry, and intact with steri-strips in place without any redness or drainage concerning for infection. Calves are soft and nontender. Assessment/Plan:  Patient is doing well now 2 weeks s/p right anterior total hip arthoplasty. They may get their incision wet in the shower at this point in time, avoiding soaking or submerging the incision in a pool or tub until 6 weeks postop. Continue with aspirin for DVT prophylaxis. Outpatient physical therapy referral written. Follow-up in 4 weeks with Dr. Sancho Kim for routine 6-week postoperative visit or sooner if any concerns. The patient's questions were sought and answered satisfactorily. They are happy with the plan and will follow as advised. X-rays needed at next visit: Postoperative radiographs right hip        [facility]    This note was dictated using Dragon software. While it was briefly proofread prior to completion, some grammatical, spelling, and word choice errors due to dictation may still occur.

## 2023-09-27 NOTE — TELEPHONE ENCOUNTER
From: Matteo Mendoza  To: Giuliano Fernandez  Sent: 9/27/2023 9:02 AM CDT  Subject: Supplement     When can I resume taking fish oil supplement.

## 2023-09-28 ENCOUNTER — OFFICE VISIT (OUTPATIENT)
Dept: PHYSICAL THERAPY | Age: 68
End: 2023-09-28
Attending: ORTHOPAEDIC SURGERY
Payer: COMMERCIAL

## 2023-09-28 PROCEDURE — 97140 MANUAL THERAPY 1/> REGIONS: CPT | Performed by: PHYSICAL THERAPIST

## 2023-09-28 PROCEDURE — 97110 THERAPEUTIC EXERCISES: CPT | Performed by: PHYSICAL THERAPIST

## 2023-09-28 NOTE — PROGRESS NOTES
Dx: R JESSICA on 7/12/2023         Authorized # of Visits:  60 visit limit         Next MD visit: 10/23/2023  Fall Risk: standard         Precautions: Ant approach JESSICA precautions - no hip hyperextension. Subjective: Pt states MD appt went well. Pt continues to have significant difficulty sleeping. Pain: 3/10 in R hip    Objective:       Assessment: Discussed importance of using pain medications as needed prior to sleeping per MD prescription to be able to sleep well at night as it will help with healing and tolerance to exercise progression in PT. Early fatigue and throbbing of R hip flexors noted with step ups. Goals:   (To be met in 12 visits)   Pt will have improved hip AROM Flex to 110 deg and ABD to 25 deg to be able to don/doff shoes and perform car transfers without difficulty  Pt will improve hip ABD and ER strength to 4+/5 to increase ease with standing and walking   Pt will demonstrate improved SLS to >10 seconds NAVA to promote safety and decrease risk of falls on uneven surfaces such as grass  Pt will perform TUG in <9 seconds, demonstrating improved gait speed for improved participation in ADL such as community ambulation  Pt will be able to squat to  light objects around the house with < 1/10 hip pain   Pt will improve functional hip strength to report ability to ascend/descend 1 flight of stairs reciprocally without use of handrail  Pt will be independent and compliant with comprehensive HEP to maintain progress achieved in PT       Plan: Continue to progress with hip flexibility and strengthening exercises per tolerance.   Date: 9/28/2023  TX#: 2/12 Date:  TX#: 3/   Date:  TX#: 4/ Date:  TX#: 5/ Date:  TX#: 6/ Date:  TX#: 7/ Date:  TX#: 8/   THERE EX 39  MINS         Recumbent bike X 5 mins         Slant board stretch 30 sec X 3         Step up fwd 6\" 2 X 10         Step up lat 6\" 2 X 10         Step down dip 4\" 2 X 10 with UE support         Supine clams RTB 2 X 10         Side lying hip abd 2 X 10         Heel slides on board X 20         MAN THERE 15 MINS         Roller to R thigh         Removed adhesive around incision site.                                           Charges: there ex X 3, man there X 1       Total Timed Treatment: 55 min  Total Treatment Time: 55 min

## 2023-10-02 ENCOUNTER — OFFICE VISIT (OUTPATIENT)
Dept: PHYSICAL THERAPY | Age: 68
End: 2023-10-02
Attending: ORTHOPAEDIC SURGERY
Payer: COMMERCIAL

## 2023-10-02 PROCEDURE — 97110 THERAPEUTIC EXERCISES: CPT

## 2023-10-02 NOTE — PROGRESS NOTES
Dx: R JESSICA on 7/12/2023         Authorized # of Visits:  60 visit limit         Next MD visit: 10/23/2023  Fall Risk: standard         Precautions: Ant approach JESSICA precautions - no hip hyperextension. Subjective: Pt reports trouble sleeping, wakes up all night long - uncomfortable. Still doesn't have the motion but the pain has reduced. Still swollen  Pain: 2-3/10 in R hip    Objective:   10/2/2023: incision covered with partially intact steri strips. No red streaking. Sensation grossly intact surrounding incision. + mild redness/ irritation? To most proximal portion of incision      Assessment: No guarding or pain during gentle PROM to tolerance. Pt reported most challenge with step down dip. Pt to ice at home after visit today. Goals:   (To be met in 12 visits)   Pt will have improved hip AROM Flex to 110 deg and ABD to 25 deg to be able to don/doff shoes and perform car transfers without difficulty  Pt will improve hip ABD and ER strength to 4+/5 to increase ease with standing and walking   Pt will demonstrate improved SLS to >10 seconds NAVA to promote safety and decrease risk of falls on uneven surfaces such as grass  Pt will perform TUG in <9 seconds, demonstrating improved gait speed for improved participation in ADL such as community ambulation  Pt will be able to squat to  light objects around the house with < 1/10 hip pain   Pt will improve functional hip strength to report ability to ascend/descend 1 flight of stairs reciprocally without use of handrail  Pt will be independent and compliant with comprehensive HEP to maintain progress achieved in PT       Plan: Continue to progress with hip flexibility and strengthening exercises per tolerance.   Pt recommended to take picture of incision (most proximal portion that has mild redness/ irritation?) to send to surgeon for assessment  Date: 9/28/2023  TX#: 2/12 Date: 10/2/2023  TX#: 3/12   Date:  TX#: 4/ Date:  TX#: 5/ Date:  TX#: 6/ Date:  TX#: 7/ Date:  TX#: 8/   THERE EX 39  MINS Therex: 40'         PROM R hip to tolerance with precautions maintained. X 8'        Recumbent bike X 5 mins Recumbent bike X 5 mins. No resistance, seat 12        Slant board stretch 30 sec X 3 Slant board stretch 30 sec X 3        Step up fwd 6\" 2 X 10 Step up fwd 6\" 2 X 10        Step up lat 6\" 2 X 10 Step up lat 6\" 2 X 10        Step down dip 4\" 2 X 10 with UE support Step down dip 4\" 2 X 10 with UE support        Supine clams RTB 2 X 10 Supine clams RTB 2 X 10        Side lying hip abd 2 X 10         Heel slides on board X 20 Heel slides on board X 30        MAN THERE 15 MINS         Roller to R thigh         Removed adhesive around incision site.                                           Charges: there ex X 3       Total Timed Treatment: 44 min  Total Treatment Time: 44 min

## 2023-10-04 ENCOUNTER — OFFICE VISIT (OUTPATIENT)
Dept: PHYSICAL THERAPY | Age: 68
End: 2023-10-04
Attending: ORTHOPAEDIC SURGERY
Payer: COMMERCIAL

## 2023-10-04 ENCOUNTER — PATIENT MESSAGE (OUTPATIENT)
Dept: ORTHOPEDICS CLINIC | Facility: CLINIC | Age: 68
End: 2023-10-04

## 2023-10-04 PROCEDURE — 97110 THERAPEUTIC EXERCISES: CPT

## 2023-10-04 PROCEDURE — 97140 MANUAL THERAPY 1/> REGIONS: CPT

## 2023-10-04 NOTE — PROGRESS NOTES
Dx: R JESSICA on 9/12/2023         Authorized # of Visits:  60 visit limit         Next MD visit: 10/23/2023  Fall Risk: standard         Precautions: Ant approach JESSICA precautions - no hip hyperextension. Subjective: Pt reports increased discomfort about 25 minutes in to two 45 minute drives. Modified his undergarments to try to avoid any rubbing to the top of the incision. Used some neosporin    Pain: 1-2/10 in R hip \"aware of it\"    Objective:   10/4/2023: mild redness/ irritation? To most proximal portion of incision. No red streaking  10/2/2023: incision covered with partially intact steri strips. No red streaking. Sensation grossly intact surrounding incision. + mild redness/ irritation? To most proximal portion of incision      Assessment: Pt is 3 wks & 1 day post-op. Pt reported feeling \"definitely looser\" after treatment including manual roller stick/ STM & gentle stretching. Pt agreed for inspection of healing incision again today & consented for PT to take photo on pt's personal cell phone in order for pt to send to surgical team via [facility] to confirm no infection as most proximal portion of incision remains slightly reddened, possibly irritated & PT is unable to diagnose so pt needs to reach out to surgical team to ensure. Pt to defer use of neosporin on incision until checking with surgical team, should just keep incision clean & dry. Pt verbalized understanding of PT recommendations.       Goals:   (To be met in 12 visits)   Pt will have improved hip AROM Flex to 110 deg and ABD to 25 deg to be able to don/doff shoes and perform car transfers without difficulty  Pt will improve hip ABD and ER strength to 4+/5 to increase ease with standing and walking   Pt will demonstrate improved SLS to >10 seconds NAVA to promote safety and decrease risk of falls on uneven surfaces such as grass  Pt will perform TUG in <9 seconds, demonstrating improved gait speed for improved participation in ADL such as community ambulation  Pt will be able to squat to  light objects around the house with < 1/10 hip pain   Pt will improve functional hip strength to report ability to ascend/descend 1 flight of stairs reciprocally without use of handrail  Pt will be independent and compliant with comprehensive HEP to maintain progress achieved in PT       Plan: Continue to progress with hip flexibility and strengthening exercises per tolerance. Date: 9/28/2023  TX#: 2/12 Date: 10/2/2023  TX#: 3/12   Date: 10/4/2023  TX#: 4/12 Date:  TX#: 5/ Date:  TX#: 6/ Date:  TX#: 7/ Date:  TX#: 8/   THERE EX 39  MINS Therex: 40' Therex: 27'        PROM R hip to tolerance with precautions maintained. X 8' Education: incision - reach out to surgical team       Recumbent bike X 5 mins Recumbent bike X 5 mins. No resistance, seat 12 NuStep L1 X 5', seat 10       Slant board stretch 30 sec X 3 Slant board stretch 30 sec X 3 Slant board stretch 30 sec X 3       Step up fwd 6\" 2 X 10 Step up fwd 6\" 2 X 10        Step up lat 6\" 2 X 10 Step up lat 6\" 2 X 10        Step down dip 4\" 2 X 10 with UE support Step down dip 4\" 2 X 10 with UE support        Supine clams RTB 2 X 10 Supine clams RTB 2 X 10 S/L Clams 3x10 no added resistance       Side lying hip abd 2 X 10  Modified anaid hip flexor/ quad stretch: tableside x 3' (with PT control of RLE, 2 folded towels under thigh to control ext)       Heel slides on board X 20 Heel slides on board X 30 Standing hip Abd no added resistance x 10 ea         Bridges 2x10         SB HS curl with Q/S in ext, 3x10       MAN THERE 15 MINS  Manual Therapy: 15'       Roller to R thigh  Roller to R thigh & manual STM to quad/ ITB/ TFL, distal adductors       Removed adhesive around incision site. Charges:  Therex x 2, Manual x 1       Total Timed Treatment: 45 min  Total Treatment Time: 45 min

## 2023-10-04 NOTE — TELEPHONE ENCOUNTER
LOV 9/27/23  DOS 9/12/23, hip    Complaint of red incision. Denies drainage, fever, chills. Photo attached.

## 2023-10-09 ENCOUNTER — OFFICE VISIT (OUTPATIENT)
Dept: PHYSICAL THERAPY | Age: 68
End: 2023-10-09
Attending: ORTHOPAEDIC SURGERY
Payer: COMMERCIAL

## 2023-10-09 PROCEDURE — 97110 THERAPEUTIC EXERCISES: CPT

## 2023-10-09 NOTE — PATIENT INSTRUCTIONS
Access Code: [email or web address]  URL: [email or web address]  Date: 10/09/2023  Prepared by: Delmis Cochran    Exercises  - Seated Hamstring Stretch  - 1 x daily - 4-5 x weekly - 3 sets - 20-30 sec hold  - Hooklying Active Hamstring Stretch  - 1 x daily - 4-5 x weekly - 1 sets - 10 reps - 10 sec hold  - Hooklying Single Knee to Chest Stretch  - 1 x daily - 4-5 x weekly - 3 sets - 30 sec hold  - Supine Sciatic Nerve Glide  - 1 x daily - 4-5 x weekly - 2 sets - 10 reps  - Standing 'L' Stretch at Counter  - 1 x daily - 4-5 x weekly - 3 sets - 20-30 sec hold  - Supine Bridge  - 1 x daily - 3 x weekly - 2-3 sets - 10 reps

## 2023-10-09 NOTE — PROGRESS NOTES
Dx: R JESSICA on 9/12/2023         Authorized # of Visits:  60 visit limit         Next MD visit: 10/23/2023  Fall Risk: standard         Precautions: Ant approach JESSICA precautions - no hip hyperextension. Subjective: Pt reports feeling he has plateaued, doing ex's daily but feeling sore - more pain sore  At home: Doing Bike 10 min, Standing bending forward to stretch back & HS    Pain: 3-4/10 in R hip    Objective:   10/9/2023: reduced redness/ irritation to most proximal portion of incision, most steri strips have been removed, only distal remains; incision closed without any red streaking with varied scabbing present  10/4/2023: mild redness/ irritation? To most proximal portion of incision. No red streaking  10/2/2023: incision covered with partially intact steri strips. No red streaking. Sensation grossly intact surrounding incision. + mild redness/ irritation? To most proximal portion of incision      Assessment: Pt recommended to defer standing lumbar flexion back & HS stretch but instead provided with additional options that PT feels are better suited for back & HS stretching. Pt was advised in activity modifications to avoid increasing pain & muscle soreness, though pt was advised some pain & soreness is normal at this time. Tightness with prone quad stretching though no pain, good tolerance to prone position with pillow under abdomen/ pelvis without increased hip pain. Pt consented for incision inspection with improved healing noted vs last visit.       Goals: (To be met in 12 visits)   Pt will have improved hip AROM Flex to 110 deg and ABD to 25 deg to be able to don/doff shoes and perform car transfers without difficulty  Pt will improve hip ABD and ER strength to 4+/5 to increase ease with standing and walking   Pt will demonstrate improved SLS to >10 seconds NAVA to promote safety and decrease risk of falls on uneven surfaces such as grass  Pt will perform TUG in <9 seconds, demonstrating improved gait speed for improved participation in ADL such as community ambulation  Pt will be able to squat to  light objects around the house with < 1/10 hip pain   Pt will improve functional hip strength to report ability to ascend/descend 1 flight of stairs reciprocally without use of handrail  Pt will be independent and compliant with comprehensive HEP to maintain progress achieved in PT       Plan: Continue to progress with hip flexibility and strengthening exercises per tolerance. Date: 9/28/2023  TX#: 2/12 Date: 10/2/2023  TX#: 3/12   Date: 10/4/2023  TX#: 4/12 Date: 10/9/2023  TX#: 5/12 Date:  TX#: 6/ Date:  TX#: 7/ Date:  TX#: 8/   THERE EX 39  MINS Therex: 40' Therex: 27' Therex: 39'       PROM R hip to tolerance with precautions maintained. X 8' Education: incision - reach out to surgical team Activity modifications - PT recommendations for exercise & mobility to reduce pain, icing      Recumbent bike X 5 mins Recumbent bike X 5 mins.  No resistance, seat 12 NuStep L1 X 5', seat 10 NuStep L2 x 5', seat 13      Slant board stretch 30 sec X 3 Slant board stretch 30 sec X 3 Slant board stretch 30 sec X 3       Step up fwd 6\" 2 X 10 Step up fwd 6\" 2 X 10  Instruction in active HS stretch & ankle pump (for more neural tension)      Step up lat 6\" 2 X 10 Step up lat 6\" 2 X 10  Instruction in [facility] stretch      Step down dip 4\" 2 X 10 with UE support Step down dip 4\" 2 X 10 with UE support  Instruction in seated HS stretch      Supine clams RTB 2 X 10 Supine clams RTB 2 X 10 S/L Clams 3x10 no added resistance Instruction in flat back stretch @ // bars      Side lying hip abd 2 X 10  Modified anaid hip flexor/ quad stretch: tableside x 3' (with PT control of RLE, 2 folded towels under thigh to control ext) Modified anaid hip flexor/ quad stretch: tableside x 3' (with PT control of RLE, 2 folded towels under thigh to control ext)      Heel slides on board X 20 Heel slides on board X 30 Standing hip Abd no added resistance x 10 ea Prone manual quad stretch 5x30\" with pillow under abdomen/ pelvis        Bridges 2x10 Education in massage roller ball for travel        SB HS curl with Q/S in ext, 3x10 Education: scar mobs when ready. Vitamin e, cocoa butter. Will revisit this when appropriate      MAN THERE 15 MINS  Manual Therapy: 15'       Roller to R thigh  Roller to R thigh & manual STM to quad/ ITB/ TFL, distal adductors       Removed adhesive around incision site. Access Code: [email or web address]  URL: [email or web address]  Date: 10/09/2023  Prepared by: Miquel Hodgson    Exercises  - Seated Hamstring Stretch  - 1 x daily - 4-5 x weekly - 3 sets - 20-30 sec hold  - Hooklying Active Hamstring Stretch  - 1 x daily - 4-5 x weekly - 1 sets - 10 reps - 10 sec hold  - Hooklying Single Knee to Chest Stretch  - 1 x daily - 4-5 x weekly - 3 sets - 30 sec hold  - Supine Sciatic Nerve Glide  - 1 x daily - 4-5 x weekly - 2 sets - 10 reps  - Standing 'L' Stretch at Counter  - 1 x daily - 4-5 x weekly - 3 sets - 20-30 sec hold  - Supine Bridge  - 1 x daily - 3 x weekly - 2-3 sets - 10 reps      Charges:  Therex x 3      Total Timed Treatment: 45 min  Total Treatment Time: 45 min

## 2023-10-11 ENCOUNTER — OFFICE VISIT (OUTPATIENT)
Dept: PHYSICAL THERAPY | Age: 68
End: 2023-10-11
Attending: ORTHOPAEDIC SURGERY
Payer: COMMERCIAL

## 2023-10-11 PROCEDURE — 97140 MANUAL THERAPY 1/> REGIONS: CPT

## 2023-10-11 PROCEDURE — 97110 THERAPEUTIC EXERCISES: CPT

## 2023-10-11 NOTE — PROGRESS NOTES
Dx: R JESSICA on 9/12/2023         Authorized # of Visits:  60 visit limit         Next MD visit: 10/23/2023  Fall Risk: standard         Precautions: Ant approach JESSICA precautions - no hip hyperextension. Subjective: Pt reports he went lighter - spaced it out more  Monday night rough  Tuesday took it easy    Pain: 0/10 awareness more than pain    Objective:   10/9/2023: reduced redness/ irritation to most proximal portion of incision, most steri strips have been removed, only distal remains; incision closed without any red streaking with varied scabbing present  10/4/2023: mild redness/ irritation? To most proximal portion of incision. No red streaking  10/2/2023: incision covered with partially intact steri strips. No red streaking. Sensation grossly intact surrounding incision. + mild redness/ irritation? To most proximal portion of incision      Assessment: Reduction in hip discomfort with application of PT recommendations for reducing intensity of HEP & self-exercise.  No guarding with PROM      Goals: (To be met in 12 visits)   Pt will have improved hip AROM Flex to 110 deg and ABD to 25 deg to be able to don/doff shoes and perform car transfers without difficulty  Pt will improve hip ABD and ER strength to 4+/5 to increase ease with standing and walking   Pt will demonstrate improved SLS to >10 seconds NAVA to promote safety and decrease risk of falls on uneven surfaces such as grass  Pt will perform TUG in <9 seconds, demonstrating improved gait speed for improved participation in ADL such as community ambulation  Pt will be able to squat to  light objects around the house with < 1/10 hip pain   Pt will improve functional hip strength to report ability to ascend/descend 1 flight of stairs reciprocally without use of handrail  Pt will be independent and compliant with comprehensive HEP to maintain progress achieved in PT       Plan: Continue to progress with hip flexibility and strengthening exercises per tolerance. Date: 9/28/2023  TX#: 2/12 Date: 10/2/2023  TX#: 3/12   Date: 10/4/2023  TX#: 4/12 Date: 10/9/2023  TX#: 5/12 Date: 10/11/2023  TX#: 6/12 Date:  TX#: 7/ Date:  TX#: 8/   THERE EX 39  MINS Therex: 40' Therex: 27' Therex: 39' Therex: 12'        PROM R hip to tolerance with precautions maintained. X 8' Education: incision - reach out to surgical team Activity modifications - PT recommendations for exercise & mobility to reduce pain, icing PROM: R hip to tolerance with precautions maintained. Recumbent bike X 5 mins Recumbent bike X 5 mins. No resistance, seat 12 NuStep L1 X 5', seat 10 NuStep L2 x 5', seat 13      Slant board stretch 30 sec X 3 Slant board stretch 30 sec X 3 Slant board stretch 30 sec X 3       Step up fwd 6\" 2 X 10 Step up fwd 6\" 2 X 10  Instruction in active HS stretch & ankle pump (for more neural tension)      Step up lat 6\" 2 X 10 Step up lat 6\" 2 X 10  Instruction in [facility] stretch      Step down dip 4\" 2 X 10 with UE support Step down dip 4\" 2 X 10 with UE support  Instruction in seated HS stretch      Supine clams RTB 2 X 10 Supine clams RTB 2 X 10 S/L Clams 3x10 no added resistance Instruction in flat back stretch @ // bars      Side lying hip abd 2 X 10  Modified anaid hip flexor/ quad stretch: tableside x 3' (with PT control of RLE, 2 folded towels under thigh to control ext) Modified anaid hip flexor/ quad stretch: tableside x 3' (with PT control of RLE, 2 folded towels under thigh to control ext)      Heel slides on board X 20 Heel slides on board X 30 Standing hip Abd no added resistance x 10 ea Prone manual quad stretch 5x30\" with pillow under abdomen/ pelvis        Bridges 2x10 Education in massage roller ball for travel        SB HS curl with Q/S in ext, 3x10 Education: scar mobs when ready. Vitamin e, cocoa butter.  Will revisit this when appropriate      MAN THERE 15 MINS  Manual Therapy: 15'  Manual Therapy: 13'     Roller to R thigh  Roller to R thigh & manual STM to quad/ ITB/ TFL, distal adductors  Manual STM to R quad, ITB, glutes, piriformis, TFL, gentle to scar (over clothing)     Removed adhesive around incision site. Access Code: [email or web address]  URL: [email or web address]  Date: 10/09/2023  Prepared by: Sabina Espinoza    Exercises  - Seated Hamstring Stretch  - 1 x daily - 4-5 x weekly - 3 sets - 20-30 sec hold  - Hooklying Active Hamstring Stretch  - 1 x daily - 4-5 x weekly - 1 sets - 10 reps - 10 sec hold  - Hooklying Single Knee to Chest Stretch  - 1 x daily - 4-5 x weekly - 3 sets - 30 sec hold  - Supine Sciatic Nerve Glide  - 1 x daily - 4-5 x weekly - 2 sets - 10 reps  - Standing 'L' Stretch at Counter  - 1 x daily - 4-5 x weekly - 3 sets - 20-30 sec hold  - Supine Bridge  - 1 x daily - 3 x weekly - 2-3 sets - 10 reps      Charges: Therex x 1, Manual x 1     Total Timed Treatment: 25 min  Total Treatment Time: 25 min. Session time limited due to pt's schedule conflict.

## 2023-10-16 ENCOUNTER — OFFICE VISIT (OUTPATIENT)
Dept: PHYSICAL THERAPY | Age: 68
End: 2023-10-16
Attending: ORTHOPAEDIC SURGERY
Payer: COMMERCIAL

## 2023-10-16 PROCEDURE — 97110 THERAPEUTIC EXERCISES: CPT

## 2023-10-16 NOTE — PROGRESS NOTES
Dx: R JESSICA on 9/12/2023         Authorized # of Visits:  60 visit limit         Next MD visit: 10/23/2023  Fall Risk: standard         Precautions: Ant approach JESSICA precautions - no hip hyperextension. Subjective: Pt reports - day after is a miserable day - better as the day goes   Did a lot of stretching Sat (yesterday miserable including thru night)  Sore with squats    Pain: 0/10    Objective:   10/9/2023: reduced redness/ irritation to most proximal portion of incision, most steri strips have been removed, only distal remains; incision closed without any red streaking with varied scabbing present  10/4/2023: mild redness/ irritation? To most proximal portion of incision. No red streaking  10/2/2023: incision covered with partially intact steri strips. No red streaking. Sensation grossly intact surrounding incision. + mild redness/ irritation? To most proximal portion of incision      Assessment: Improving gait pattern with more normalized hip extension during push off. Form check with squats: pt not coming back up to neutral with each rep effectively staying in hip flexion & was instructed to perform glute squeeze upon returning back up & with this correction pt reported feeling a difference. Cued with standing hip abduction with yellow theraband at ankles to avoid compensatory trunk lean. Pt reported feeling good stretch with prone quad stretch & modified anaid stretch. Emphasized the importance of keeping thigh neutral during modified anaid stretch to HEP to ensure controlled hip extension stretch (avoid hip hyperextension); pt verbalized understanding.       Goals: (To be met in 12 visits)   Pt will have improved hip AROM Flex to 110 deg and ABD to 25 deg to be able to don/doff shoes and perform car transfers without difficulty  Pt will improve hip ABD and ER strength to 4+/5 to increase ease with standing and walking   Pt will demonstrate improved SLS to >10 seconds NAVA to promote safety and decrease risk of falls on uneven surfaces such as grass  Pt will perform TUG in <9 seconds, demonstrating improved gait speed for improved participation in ADL such as community ambulation  Pt will be able to squat to  light objects around the house with < 1/10 hip pain   Pt will improve functional hip strength to report ability to ascend/descend 1 flight of stairs reciprocally without use of handrail  Pt will be independent and compliant with comprehensive HEP to maintain progress achieved in PT       Plan: Continue to progress with hip flexibility and strengthening exercises per tolerance. Date: 9/28/2023  TX#: 2/12 Date: 10/2/2023  TX#: 3/12   Date: 10/4/2023  TX#: 4/12 Date: 10/9/2023  TX#: 5/12 Date: 10/11/2023  TX#: 6/12 Date: 10/16/2023  TX#: 7/12 Date:  TX#: 8/   THERE EX 39  MINS Therex: 40' Therex: 27' Therex: 39' Therex: 12'   Therex: 43'     PROM R hip to tolerance with precautions maintained. X 8' Education: incision - reach out to surgical team Activity modifications - PT recommendations for exercise & mobility to reduce pain, icing PROM: R hip to tolerance with precautions maintained. Educate: More intervals of ex's/ day vs 1 longer interval to prevent increased feeling of stiffness    Recumbent bike X 5 mins Recumbent bike X 5 mins. No resistance, seat 12 NuStep L1 X 5', seat 10 NuStep L2 x 5', seat 13  Calf raises - instruct for HEP    Slant board stretch 30 sec X 3 Slant board stretch 30 sec X 3 Slant board stretch 30 sec X 3   hooklying PPT x 30    Step up fwd 6\" 2 X 10 Step up fwd 6\" 2 X 10  Instruction in active HS stretch & ankle pump (for more neural tension)  Educate: heat vs cold.  Wait until at least 6 wks post-op to add heat - talk with surgeon    Step up lat 6\" 2 X 10 Step up lat 6\" 2 X 10  Instruction in [facility] stretch      Step down dip 4\" 2 X 10 with UE support Step down dip 4\" 2 X 10 with UE support  Instruction in seated HS stretch      Supine clams RTB 2 X 10 Supine clams RTB 2 X 10 S/L Clams 3x10 no added resistance Instruction in flat back stretch @ // bars  Retro walk in // bars x 4 laps    Side lying hip abd 2 X 10  Modified anaid hip flexor/ quad stretch: tableside x 3' (with PT control of RLE, 2 folded towels under thigh to control ext) Modified anaid hip flexor/ quad stretch: tableside x 3' (with PT control of RLE, 2 folded towels under thigh to control ext)  Modified anaid hip flexor/ quad stretch: tableside x 3' (with PT control of RLE, 2 folded towels under thigh to control ext) - add to HEP    Heel slides on board X 20 Heel slides on board X 30 Standing hip Abd no added resistance x 10 ea Prone manual quad stretch 5x30\" with pillow under abdomen/ pelvis  Prone manual quad stretch 5x30\" with pillow under abdomen/ pelvis  Instruct in prone over 1 pillow laying for HEP with glute sets      Bridges 2x10 Education in massage roller ball for travel  Form check: squats  -squeeze glutes when come back up - return to neutral      SB HS curl with Q/S in ext, 3x10 Education: scar mobs when ready. Vitamin e, cocoa butter. Will revisit this when appropriate  Standing hip Abd form check: YTB at ankles 2x15 ea  Flex 2x15 ea YTB at ankles    MAN THERE 15 MINS  Manual Therapy: 15'  Manual Therapy: 13'     Roller to R thigh  Roller to R thigh & manual STM to quad/ ITB/ TFL, distal adductors  Manual STM to R quad, ITB, glutes, piriformis, TFL, gentle to scar (over clothing)     Removed adhesive around incision site. Access Code: [email or web address]  URL: [email or web address]  Date: 10/09/2023  Prepared by: Caro Foy    Exercises  - Seated Hamstring Stretch  - 1 x daily - 4-5 x weekly - 3 sets - 20-30 sec hold  - Hooklying Active Hamstring Stretch  - 1 x daily - 4-5 x weekly - 1 sets - 10 reps - 10 sec hold  - Hooklying Single Knee to Chest Stretch  - 1 x daily - 4-5 x weekly - 3 sets - 30 sec hold  - Supine Sciatic Nerve Glide  - 1 x daily - 4-5 x weekly - 2 sets - 10 reps  - Standing 'L' Stretch at Counter  - 1 x daily - 4-5 x weekly - 3 sets - 20-30 sec hold  - Supine Bridge  - 1 x daily - 3 x weekly - 2-3 sets - 10 reps    Access Code: [email or web address]  URL: [email or web address]  Date: 10/16/2023  Prepared by: Raad Collier    Exercises  - Modified Gianni Stretch  - 1-2 x daily - 4 x weekly - 2-3 minutes hold  - Lying Prone with 1 Pillow  - 1-2 x daily - 7 x weekly - 2 sets - 10 reps      Charges:  Therex x 3    Total Timed Treatment: 42 min  Total Treatment Time: 42 min

## 2023-10-18 ENCOUNTER — OFFICE VISIT (OUTPATIENT)
Dept: PHYSICAL THERAPY | Age: 68
End: 2023-10-18
Attending: ORTHOPAEDIC SURGERY
Payer: COMMERCIAL

## 2023-10-18 PROCEDURE — 97140 MANUAL THERAPY 1/> REGIONS: CPT

## 2023-10-18 PROCEDURE — 97110 THERAPEUTIC EXERCISES: CPT

## 2023-10-18 NOTE — PROGRESS NOTES
Dx: R JESSICA on 9/12/2023         Authorized # of Visits:  60 visit limit         Next MD visit: 10/23/2023  Fall Risk: standard         Precautions: Ant approach JESSICA precautions - no hip hyperextension. Subjective: Pt reports: yesterday & last night not good. Affecting R knee. Hardly slept. Limping most of the morning. Stretches yesterday - light. In the moment - stretches feel good. Felt really good leaving here the other day. Sitting - most pain. Sit too long - \"not good\"    Pain: 0/10 \"not painful just tight\"    Objective:   10/18/2023: scar healing WNL with no s/s infection noted. Mostly fully closed other than small scabbing on most proximal portion of incision  0/9/2023: reduced redness/ irritation to most proximal portion of incision, most steri strips have been removed, only distal remains; incision closed without any red streaking with varied scabbing present  10/4/2023: mild redness/ irritation? To most proximal portion of incision. No red streaking  10/2/2023: incision covered with partially intact steri strips. No red streaking. Sensation grossly intact surrounding incision. + mild redness/ irritation? To most proximal portion of incision      Assessment: Recommending pt to reduce intensity of stretching despite relief noted while performing, as symptoms may be noted worse after stretching. Added more isometric strengthening today which pt had great amanda to: cued with standing hip Abd at wall when WB on the RLE to avoid hip hike as pt had increased LBP at first but when provided correction to form able to complete without LBP. Pt reported feeling better, looser, with ambulating in clinic end of session however still reported not feeling as good as he did when leaving clinic last visit.       Goals: (To be met in 12 visits)   Pt will have improved hip AROM Flex to 110 deg and ABD to 25 deg to be able to don/doff shoes and perform car transfers without difficulty  Pt will improve hip ABD and ER strength to 4+/5 to increase ease with standing and walking   Pt will demonstrate improved SLS to >10 seconds NAVA to promote safety and decrease risk of falls on uneven surfaces such as grass  Pt will perform TUG in <9 seconds, demonstrating improved gait speed for improved participation in ADL such as community ambulation  Pt will be able to squat to  light objects around the house with < 1/10 hip pain   Pt will improve functional hip strength to report ability to ascend/descend 1 flight of stairs reciprocally without use of handrail  Pt will be independent and compliant with comprehensive HEP to maintain progress achieved in PT       Plan: Continue to progress with hip flexibility and strengthening exercises per tolerance. Surgical post-op visit Mon 10/23. Pt may be traveling next week if cleared by surgeon. If so pt may reach out to PT if he has any questions or concerns while he is traveling. Date: 9/28/2023  TX#: 2/12 Date: 10/2/2023  TX#: 3/12   Date: 10/4/2023  TX#: 4/12 Date: 10/9/2023  TX#: 5/12 Date: 10/11/2023  TX#: 6/12 Date: 10/16/2023  TX#: 7/12 Date: 10/18/2023  TX#: 8/12   THERE EX 39  MINS Therex: 40' Therex: 30' Therex: 39' Therex: 12'   Therex: 43' Therex: 23'    PROM R hip to tolerance with precautions maintained. X 8' Education: incision - reach out to surgical team Activity modifications - PT recommendations for exercise & mobility to reduce pain, icing PROM: R hip to tolerance with precautions maintained. Educate: More intervals of ex's/ day vs 1 longer interval to prevent increased feeling of stiffness    Recumbent bike X 5 mins Recumbent bike X 5 mins.  No resistance, seat 12 NuStep L1 X 5', seat 10 NuStep L2 x 5', seat 13  Calf raises - instruct for HEP Educate: reduce intensity of stretching to prevent aggravation of sxs   Slant board stretch 30 sec X 3 Slant board stretch 30 sec X 3 Slant board stretch 30 sec X 3   hooklying PPT x 30 Standing Hip Abd toña with yellow ball at wall, x 20 ea   Step up fwd 6\" 2 X 10 Step up fwd 6\" 2 X 10  Instruction in active HS stretch & ankle pump (for more neural tension)  Educate: heat vs cold. Wait until at least 6 wks post-op to add heat - talk with surgeon    Step up lat 6\" 2 X 10 Step up lat 6\" 2 X 10  Instruction in [facility] MENT[facility] HE[facility] stretch      Step down dip 4\" 2 X 10 with UE support Step down dip 4\" 2 X 10 with UE support  Instruction in seated HS stretch   Seated Hip ER ana cristina with yellow ball squeeze, 5\" x 2x15    Supine clams RTB 2 X 10 Supine clams RTB 2 X 10 S/L Clams 3x10 no added resistance Instruction in flat back stretch @ // bars  Retro walk in // bars x 4 laps Hip Abd Ana Cristina w/ purple pilates ring,  5\" x 2x15 hooklying   Side lying hip abd 2 X 10  Modified anaid hip flexor/ quad stretch: tableside x 3' (with PT control of RLE, 2 folded towels under thigh to control ext) Modified anaid hip flexor/ quad stretch: tableside x 3' (with PT control of RLE, 2 folded towels under thigh to control ext)  Modified anaid hip flexor/ quad stretch: tableside x 3' (with PT control of RLE, 2 folded towels under thigh to control ext) - add to HEP    Heel slides on board X 20 Heel slides on board X 30 Standing hip Abd no added resistance x 10 ea Prone manual quad stretch 5x30\" with pillow under abdomen/ pelvis  Prone manual quad stretch 5x30\" with pillow under abdomen/ pelvis  Instruct in prone over 1 pillow laying for HEP with glute sets      Bridges 2x10 Education in massage roller ball for travel  Form check: squats  -squeeze glutes when come back up - return to neutral      SB HS curl with Q/S in ext, 3x10 Education: scar mobs when ready. Vitamin e, cocoa butter.  Will revisit this when appropriate  Standing hip Abd form check: YTB at ankles 2x15 ea  Flex 2x15 ea YTB at ankles    MAN THERE 15 MINS  Manual Therapy: 15'  Manual Therapy: 13'  Manual Therapy: 17'   Roller to R thigh  Roller to R thigh & manual STM to quad/ ITB/ TFL, distal adductors  Manual STM to R quad, ITB, glutes, piriformis, TFL, gentle to scar (over clothing)  Scar assess - with scar mob over clothing (educate in use of vitamin e/ cocoa butter for self scar mobs when cleared by PT - hold off for now)  Gentle STM R TFL & prox rectus   Removed adhesive around incision site. Access Code: [email or web address]  URL: [employer]/  Date: 10/09/2023  Prepared by: Henri Gonzalez    Exercises  - Seated Hamstring Stretch  - 1 x daily - 4-5 x weekly - 3 sets - 20-30 sec hold  - Hooklying Active Hamstring Stretch  - 1 x daily - 4-5 x weekly - 1 sets - 10 reps - 10 sec hold  - Hooklying Single Knee to Chest Stretch  - 1 x daily - 4-5 x weekly - 3 sets - 30 sec hold  - Supine Sciatic Nerve Glide  - 1 x daily - 4-5 x weekly - 2 sets - 10 reps  - Standing 'L' Stretch at Counter  - 1 x daily - 4-5 x weekly - 3 sets - 20-30 sec hold  - Supine Bridge  - 1 x daily - 3 x weekly - 2-3 sets - 10 reps    Access Code: [email or web address]  URL: [employer]/  Date: 10/16/2023  Prepared by: Henri Gonzalez    Exercises  - Modified Gianni Stretch  - 1-2 x daily - 4 x weekly - 2-3 minutes hold  - Lying Prone with 1 Pillow  - 1-2 x daily - 7 x weekly - 2 sets - 10 reps      Charges:  Therex x 2, Manual x 1    Total Timed Treatment: 40 min  Total Treatment Time: 40 min

## 2023-10-20 ENCOUNTER — TELEPHONE (OUTPATIENT)
Dept: ORTHOPEDICS CLINIC | Facility: CLINIC | Age: 68
End: 2023-10-20

## 2023-10-20 DIAGNOSIS — Z96.641 STATUS POST RIGHT HIP REPLACEMENT: Primary | ICD-10-CM

## 2023-10-20 NOTE — TELEPHONE ENCOUNTER
X-rays ordered for upcoming appointment     Please reach out to patient and inform him to come 15-20 minutes earlier to complete images

## 2023-10-21 ENCOUNTER — HOSPITAL ENCOUNTER (EMERGENCY)
Age: 68
Discharge: HOME | End: 2023-10-21
Attending: EMERGENCY MEDICINE
Payer: COMMERCIAL

## 2023-10-21 VITALS
DIASTOLIC BLOOD PRESSURE: 86 MMHG | BODY MASS INDEX: 28.07 KG/M2 | SYSTOLIC BLOOD PRESSURE: 154 MMHG | TEMPERATURE: 98 F | OXYGEN SATURATION: 96 % | RESPIRATION RATE: 17 BRPM | HEIGHT: 71.5 IN | HEART RATE: 77 BPM | WEIGHT: 205 LBS

## 2023-10-21 DIAGNOSIS — K12.2 UVULITIS: Primary | ICD-10-CM

## 2023-10-21 PROCEDURE — 87430 STREP A AG IA: CPT | Performed by: EMERGENCY MEDICINE

## 2023-10-21 PROCEDURE — 99283 EMERGENCY DEPT VISIT LOW MDM: CPT

## 2023-10-21 RX ORDER — PREDNISONE 20 MG/1
40 TABLET ORAL DAILY
Qty: 10 TABLET | Refills: 0 | Status: SHIPPED | OUTPATIENT
Start: 2023-10-21 | End: 2023-10-26

## 2023-10-21 RX ORDER — DIPHENHYDRAMINE HYDROCHLORIDE 12.5 MG/5ML
25 SOLUTION ORAL ONCE
Status: COMPLETED | OUTPATIENT
Start: 2023-10-21 | End: 2023-10-21

## 2023-10-21 RX ORDER — PREDNISONE 20 MG/1
60 TABLET ORAL ONCE
Status: COMPLETED | OUTPATIENT
Start: 2023-10-21 | End: 2023-10-21

## 2023-10-23 ENCOUNTER — HOSPITAL ENCOUNTER (OUTPATIENT)
Dept: GENERAL RADIOLOGY | Age: 68
Discharge: HOME | End: 2023-10-23
Attending: ORTHOPAEDIC SURGERY

## 2023-10-23 ENCOUNTER — APPOINTMENT (OUTPATIENT)
Dept: PHYSICAL THERAPY | Age: 68
End: 2023-10-23
Attending: ORTHOPAEDIC SURGERY
Payer: COMMERCIAL

## 2023-10-23 ENCOUNTER — OFFICE VISIT (OUTPATIENT)
Dept: ORTHOPEDICS CLINIC | Facility: CLINIC | Age: 68
End: 2023-10-23
Payer: COMMERCIAL

## 2023-10-23 DIAGNOSIS — Z96.641 STATUS POST RIGHT HIP REPLACEMENT: Primary | ICD-10-CM

## 2023-10-23 DIAGNOSIS — Z96.641 STATUS POST RIGHT HIP REPLACEMENT: ICD-10-CM

## 2023-10-23 PROCEDURE — 99024 POSTOP FOLLOW-UP VISIT: CPT | Performed by: ORTHOPAEDIC SURGERY

## 2023-10-23 PROCEDURE — 73502 X-RAY EXAM HIP UNI 2-3 VIEWS: CPT | Performed by: ORTHOPAEDIC SURGERY

## 2023-10-23 NOTE — PROGRESS NOTES
[facility] Progress Note    Subjective: Patient returns to clinic 6 weeks postop from right hip replacement. He reports that he is doing well, pain has been getting much better with the hip. He does have some pain in the back as well as some continued tightness/stiffness around the hip. He does note some start up stiffness and pain that improves as he continues to walk. He does feel that after therapy and home exercises when he is most sore the next day. He has not had any issues with incision healing. He is looking to get back to the gym. Objective: Patient is very pleasant, appears comfortable. Right hip incision is well-healed. He is ambulating with a nonantalgic gait. Imaging: X-rays of the right hip and pelvis personally viewed, independently interpreted and radiology report read. Demonstrates uncemented right total hip arthroplasty unchanged from postoperative films. Right lower extremity length and about 4 to 5 mm as expected from preoperative template, preoperatively longer on the surgical side when measuring teardrop to lesser trochanter by 5 to 8 mm. No subsidence of the stem, cup is in just over 40 degrees abduction and just under 20 degrees anteversion. Assessment/Plan: 69-year-old male 6 weeks postop status post anterior right total hip arthroplasty. He is doing very well. Continue with therapy and wean into home exercise program, may consider limiting some of the more aggressive activities in order to avoid increased soreness. He may use over-the-counter ibuprofen at this point as this is helped him in the past.  He does note that recent steroid medication for an upper respiratory/GI issue has made his symptoms much better as well. No restrictions on the incision at this point. He is done with aspirin DVT prophylaxis.   We will have him follow-up in 6 weeks, he may cancel that appointment if he is doing well, at which time we would have him follow-up 1 year from surgery with repeat x-rays.     Tapan Hopper MD, [address] [facility]  Phone [phone]  Fax [phone]

## 2023-10-25 ENCOUNTER — APPOINTMENT (OUTPATIENT)
Dept: PHYSICAL THERAPY | Age: 68
End: 2023-10-25
Attending: ORTHOPAEDIC SURGERY
Payer: COMMERCIAL

## 2023-10-27 ENCOUNTER — TELEPHONE (OUTPATIENT)
Dept: PHYSICAL THERAPY | Age: 68
End: 2023-10-27

## 2023-10-30 ENCOUNTER — APPOINTMENT (OUTPATIENT)
Dept: PHYSICAL THERAPY | Age: 68
End: 2023-10-30
Attending: ORTHOPAEDIC SURGERY
Payer: COMMERCIAL

## 2023-11-01 ENCOUNTER — OFFICE VISIT (OUTPATIENT)
Dept: PHYSICAL THERAPY | Age: 68
End: 2023-11-01
Attending: ORTHOPAEDIC SURGERY
Payer: COMMERCIAL

## 2023-11-01 PROCEDURE — 97110 THERAPEUTIC EXERCISES: CPT

## 2023-11-01 NOTE — PROGRESS NOTES
Dx: R JESSICA on 9/12/2023         Authorized # of Visits:  60 visit limit         Next MD visit: 12/4/2023  Fall Risk: standard         Precautions: Ant approach JESSICA precautions - no hip hyperextension. Subjective: Pt reports he was recommended to continue with PT. Stood up when he could last week with flying. Has been busy with work - \"good days & bad days\". With an extreme stretch he can get his sock on - feels limited in the anterior aspect of his hip. \"Back has been behaving\" but hasn't pushed it yet. Biggest concern is with donning sock. Was on 5 day regime of steroids for unrelated issue recently - everything to the hip felt better while on meds, still maintained about 75% now. Ibuprofen 1x/ day or every other day. Reports surgeon gave him permission to do the inversion table. Pain: 0/10 \"I know I have a hip\"    Objective:   10/18/2023: scar healing WNL with no s/s infection noted. Mostly fully closed other than small scabbing on most proximal portion of incision  0/9/2023: reduced redness/ irritation to most proximal portion of incision, most steri strips have been removed, only distal remains; incision closed without any red streaking with varied scabbing present  10/4/2023: mild redness/ irritation? To most proximal portion of incision. No red streaking  10/2/2023: incision covered with partially intact steri strips. No red streaking. Sensation grossly intact surrounding incision. + mild redness/ irritation? To most proximal portion of incision      Assessment: Pt is 7 wks & 1 day post-op. Scar fully closed with no abnormal redness noted however just mild \"pink\" coloration to scar & just surrounding (mostly proximal) which appears consistent with normal healing progression of scar, though pt advised to take picture of scar over the next several days & if any redness is increased he should reach out to surgeon for evaluation. Pt verbalized understanding of PT recommendations.  Pt reported relief with prone quad stretch self-performed today. Pt amb throughout session with almost normal gait pattern by end of visit. Goals: (To be met in 12 visits)   Pt will have improved hip AROM Flex to 110 deg and ABD to 25 deg to be able to don/doff shoes and perform car transfers without difficulty  Pt will improve hip ABD and ER strength to 4+/5 to increase ease with standing and walking   Pt will demonstrate improved SLS to >10 seconds NAVA to promote safety and decrease risk of falls on uneven surfaces such as grass  Pt will perform TUG in <9 seconds, demonstrating improved gait speed for improved participation in ADL such as community ambulation  Pt will be able to squat to  light objects around the house with < 1/10 hip pain   Pt will improve functional hip strength to report ability to ascend/descend 1 flight of stairs reciprocally without use of handrail  Pt will be independent and compliant with comprehensive HEP to maintain progress achieved in PT       Plan: Continue to progress with hip flexibility and strengthening exercises per tolerance. Date: 9/28/2023  TX#: 2/12 Date: 10/2/2023  TX#: 3/12   Date: 10/4/2023  TX#: 4/12 Date: 10/9/2023  TX#: 5/12 Date: 10/11/2023  TX#: 6/12 Date: 10/16/2023  TX#: 7/12 Date: 10/18/2023  TX#: 8/12 Date: 11/1/2023  Tx#: 9/12   THERE EX 45  MINS Therex: 40' Therex: 30' Therex: 39' Therex: 12'   Therex: 43' Therex: 21' Therex: 39'    PROM R hip to tolerance with precautions maintained. X 8' Education: incision - reach out to surgical team Activity modifications - PT recommendations for exercise & mobility to reduce pain, icing PROM: R hip to tolerance with precautions maintained. Educate: More intervals of ex's/ day vs 1 longer interval to prevent increased feeling of stiffness  HEP review/ check in - frequency, intensity  POC - moving forward with discharge planning   Recumbent bike X 5 mins Recumbent bike X 5 mins.  No resistance, seat 12 NuStep L1 X 5', seat 10 NuStep L2 x 5', seat 13  Calf raises - instruct for HEP Educate: reduce intensity of stretching to prevent aggravation of sxs NuStep L4 x 6'  Education: scar: see above   Slant board stretch 30 sec X 3 Slant board stretch 30 sec X 3 Slant board stretch 30 sec X 3   hooklying PPT x 30 Standing Hip Abd ana cristina with yellow ball at wall, x 20 ea Prone quad stretch with two rolled towels & strap (self-performed) 6x30\"   Step up fwd 6\" 2 X 10 Step up fwd 6\" 2 X 10  Instruction in active HS stretch & ankle pump (for more neural tension)  Educate: heat vs cold.  Wait until at least 6 wks post-op to add heat - talk with surgeon     Step up lat 6\" 2 X 10 Step up lat 6\" 2 X 10  Instruction in [facility] stretch    Prone hip ER ana cristina with yellow ball squeeze, 20x5\"   Step down dip 4\" 2 X 10 with UE support Step down dip 4\" 2 X 10 with UE support  Instruction in seated HS stretch   Seated Hip ER ana cristina with yellow ball squeeze, 5\" x 2x15  Clams 2x10  Reverse clams 2x10   Supine clams RTB 2 X 10 Supine clams RTB 2 X 10 S/L Clams 3x10 no added resistance Instruction in flat back stretch @ // bars  Retro walk in // bars x 4 laps Hip Abd Ana Cristina w/ purple pilates ring,  5\" x 2x15 hooklying    Side lying hip abd 2 X 10  Modified anaid hip flexor/ quad stretch: tableside x 3' (with PT control of RLE, 2 folded towels under thigh to control ext) Modified anaid hip flexor/ quad stretch: tableside x 3' (with PT control of RLE, 2 folded towels under thigh to control ext)  Modified anaid hip flexor/ quad stretch: tableside x 3' (with PT control of RLE, 2 folded towels under thigh to control ext) - add to HEP     Heel slides on board X 20 Heel slides on board X 30 Standing hip Abd no added resistance x 10 ea Prone manual quad stretch 5x30\" with pillow under abdomen/ pelvis  Prone manual quad stretch 5x30\" with pillow under abdomen/ pelvis  Instruct in prone over 1 pillow laying for HEP with glute sets       Bridges 2x10 Education in massage roller ball for travel  Form check: squats  -squeeze glutes when come back up - return to neutral       SB HS curl with Q/S in ext, 3x10 Education: scar mobs when ready. Vitamin e, cocoa butter. Will revisit this when appropriate  Standing hip Abd form check: YTB at ankles 2x15 ea  Flex 2x15 ea YTB at ankles     MAN THERE 15 MINS  Manual Therapy: 15'  Manual Therapy: 13'  Manual Therapy: 17'    Roller to R thigh  Roller to R thigh & manual STM to quad/ ITB/ TFL, distal adductors  Manual STM to R quad, ITB, glutes, piriformis, TFL, gentle to scar (over clothing)  Scar assess - with scar mob over clothing (educate in use of vitamin e/ cocoa butter for self scar mobs when cleared by PT - hold off for now)  Gentle STM R TFL & prox rectus    Removed adhesive around incision site. Access Code: [email or web address]  URL: [email or web address]  Date: 10/09/2023  Prepared by: Olivia Oviedo    Exercises  - Seated Hamstring Stretch  - 1 x daily - 4-5 x weekly - 3 sets - 20-30 sec hold  - Hooklying Active Hamstring Stretch  - 1 x daily - 4-5 x weekly - 1 sets - 10 reps - 10 sec hold  - Hooklying Single Knee to Chest Stretch  - 1 x daily - 4-5 x weekly - 3 sets - 30 sec hold  - Supine Sciatic Nerve Glide  - 1 x daily - 4-5 x weekly - 2 sets - 10 reps  - Standing 'L' Stretch at Counter  - 1 x daily - 4-5 x weekly - 3 sets - 20-30 sec hold  - Supine Bridge  - 1 x daily - 3 x weekly - 2-3 sets - 10 reps    Access Code: [email or web address]  URL: [email or web address]  Date: 10/16/2023  Prepared by: Olivia Oviedo    Exercises  - Modified Gianni Stretch  - 1-2 x daily - 4 x weekly - 2-3 minutes hold  - Lying Prone with 1 Pillow  - 1-2 x daily - 7 x weekly - 2 sets - 10 reps      Charges:  Therex x 3   Total Timed Treatment: 41 min  Total Treatment Time: 41 min

## 2023-11-06 ENCOUNTER — OFFICE VISIT (OUTPATIENT)
Dept: PHYSICAL THERAPY | Age: 68
End: 2023-11-06
Attending: ORTHOPAEDIC SURGERY
Payer: COMMERCIAL

## 2023-11-06 PROCEDURE — 97110 THERAPEUTIC EXERCISES: CPT

## 2023-11-06 PROCEDURE — 97140 MANUAL THERAPY 1/> REGIONS: CPT

## 2023-11-06 NOTE — PROGRESS NOTES
Dx: R JESSICA on 9/12/2023         Authorized # of Visits:  60 visit limit         Next MD visit: 12/4/2023  Fall Risk: standard         Precautions: Ant approach JESSICA precautions - no hip hyperextension. Subjective: Pt reports he may have exercised a little too much over the weekend as his calf muscles are \"killing him\" - 5 min on elliptical, been stretching out - though felt good to do this. Able to get sock on now (bend down). Feels scar is looking better    Pain: 0/10    Objective:   10/18/2023: scar healing WNL with no s/s infection noted. Mostly fully closed other than small scabbing on most proximal portion of incision  0/9/2023: reduced redness/ irritation to most proximal portion of incision, most steri strips have been removed, only distal remains; incision closed without any red streaking with varied scabbing present  10/4/2023: mild redness/ irritation? To most proximal portion of incision. No red streaking  10/2/2023: incision covered with partially intact steri strips. No red streaking. Sensation grossly intact surrounding incision. + mild redness/ irritation? To most proximal portion of incision      Assessment: Pt denied need for PT to visually assess scar today. Pt reported relief, or \"good sore\" with manual STM to anterolateral R LE. Amb through session without any antalgia. PROM grossly assessed today with pt having increased R (surgical) hip IR vs L, though slightly reduced R hip ER vs L.       Goals: (To be met in 12 visits)   Pt will have improved hip AROM Flex to 110 deg and ABD to 25 deg to be able to don/doff shoes and perform car transfers without difficulty  Pt will improve hip ABD and ER strength to 4+/5 to increase ease with standing and walking   Pt will demonstrate improved SLS to >10 seconds NAVA to promote safety and decrease risk of falls on uneven surfaces such as grass  Pt will perform TUG in <9 seconds, demonstrating improved gait speed for improved participation in ADL such as community ambulation  Pt will be able to squat to  light objects around the house with < 1/10 hip pain   Pt will improve functional hip strength to report ability to ascend/descend 1 flight of stairs reciprocally without use of handrail  Pt will be independent and compliant with comprehensive HEP to maintain progress achieved in PT       Plan: Continue to progress with hip flexibility and strengthening exercises per tolerance. Progressing towards discharge. Pt unable to come in next week due to travel  Date: 9/28/2023  TX#: 2/12 Date: 10/2/2023  TX#: 3/12   Date: 10/4/2023  TX#: 4/12 Date: 10/9/2023  TX#: 5/12 Date: 10/11/2023  TX#: 6/12 Date: 10/16/2023  TX#: 7/12 Date: 10/18/2023  TX#: 8/12 Date: 11/1/2023  Tx#: 9/12 Date: 11/6/2023  Tx#: 10/12   THERE EX 45  MINS Therex: 40' Therex: 30' Therex: 39' Therex: 12'   Therex: 43' Therex: 23' Therex: 39' Therex: 12'    PROM R hip to tolerance with precautions maintained. X 8' Education: incision - reach out to surgical team Activity modifications - PT recommendations for exercise & mobility to reduce pain, icing PROM: R hip to tolerance with precautions maintained. Educate: More intervals of ex's/ day vs 1 longer interval to prevent increased feeling of stiffness  HEP review/ check in - frequency, intensity  POC - moving forward with discharge planning    Recumbent bike X 5 mins Recumbent bike X 5 mins.  No resistance, seat 12 NuStep L1 X 5', seat 10 NuStep L2 x 5', seat 13  Calf raises - instruct for HEP Educate: reduce intensity of stretching to prevent aggravation of sxs NuStep L4 x 6'  Education: scar: see above Slantboard 2-way calf stretch: 3x30\" ea B    Gentle PROM hip flex, IR, ER, abduction   Slant board stretch 30 sec X 3 Slant board stretch 30 sec X 3 Slant board stretch 30 sec X 3   hooklying PPT x 30 Standing Hip Abd toña with yellow ball at wall, x 20 ea Prone quad stretch with two rolled towels & strap (self-performed) 6x30\" Form check - HEP ex's (standing)   Step up fwd 6\" 2 X 10 Step up fwd 6\" 2 X 10  Instruction in active HS stretch & ankle pump (for more neural tension)  Educate: heat vs cold. Wait until at least 6 wks post-op to add heat - talk with surgeon      Step up lat 6\" 2 X 10 Step up lat 6\" 2 X 10  Instruction in [facility] stretch    Prone hip ER ana cristina with yellow ball squeeze, 20x5\"    Step down dip 4\" 2 X 10 with UE support Step down dip 4\" 2 X 10 with UE support  Instruction in seated HS stretch   Seated Hip ER ana cristina with yellow ball squeeze, 5\" x 2x15  Clams 2x10  Reverse clams 2x10    Supine clams RTB 2 X 10 Supine clams RTB 2 X 10 S/L Clams 3x10 no added resistance Instruction in flat back stretch @ // bars  Retro walk in // bars x 4 laps Hip Abd Ana Cristina w/ purple pilates ring,  5\" x 2x15 hooklying     Side lying hip abd 2 X 10  Modified anaid hip flexor/ quad stretch: tableside x 3' (with PT control of RLE, 2 folded towels under thigh to control ext) Modified anaid hip flexor/ quad stretch: tableside x 3' (with PT control of RLE, 2 folded towels under thigh to control ext)  Modified anaid hip flexor/ quad stretch: tableside x 3' (with PT control of RLE, 2 folded towels under thigh to control ext) - add to HEP      Heel slides on board X 20 Heel slides on board X 30 Standing hip Abd no added resistance x 10 ea Prone manual quad stretch 5x30\" with pillow under abdomen/ pelvis  Prone manual quad stretch 5x30\" with pillow under abdomen/ pelvis  Instruct in prone over 1 pillow laying for HEP with glute sets        Bridges 2x10 Education in massage roller ball for travel  Form check: squats  -squeeze glutes when come back up - return to neutral        SB HS curl with Q/S in ext, 3x10 Education: scar mobs when ready. Vitamin e, cocoa butter.  Will revisit this when appropriate  Standing hip Abd form check: YTB at ankles 2x15 ea  Flex 2x15 ea YTB at ankles      MAN THERE 15 MINS  Manual Therapy: 15'  Manual Therapy: 13'  Manual Therapy: 17'  Manual Therapy: 16'   Roller to R thigh  Roller to R thigh & manual STM to quad/ ITB/ TFL, distal adductors  Manual STM to R quad, ITB, glutes, piriformis, TFL, gentle to scar (over clothing)  Scar assess - with scar mob over clothing (educate in use of vitamin e/ cocoa butter for self scar mobs when cleared by PT - hold off for now)  Gentle STM R TFL & prox rectus  Manual STM to R quad, ITB, TFL, gentle to scar (over clothing)   Removed adhesive around incision site. Access Code: [email or web address]  URL: [email or web address]  Date: 10/09/2023  Prepared by: Chace Nett    Exercises  - Seated Hamstring Stretch  - 1 x daily - 4-5 x weekly - 3 sets - 20-30 sec hold  - Hooklying Active Hamstring Stretch  - 1 x daily - 4-5 x weekly - 1 sets - 10 reps - 10 sec hold  - Hooklying Single Knee to Chest Stretch  - 1 x daily - 4-5 x weekly - 3 sets - 30 sec hold  - Supine Sciatic Nerve Glide  - 1 x daily - 4-5 x weekly - 2 sets - 10 reps  - Standing 'L' Stretch at Counter  - 1 x daily - 4-5 x weekly - 3 sets - 20-30 sec hold  - Supine Bridge  - 1 x daily - 3 x weekly - 2-3 sets - 10 reps    Access Code: [email or web address]  URL: [email or web address]  Date: 10/16/2023  Prepared by: Chace Nett    Exercises  - Modified Gianni Stretch  - 1-2 x daily - 4 x weekly - 2-3 minutes hold  - Lying Prone with 1 Pillow  - 1-2 x daily - 7 x weekly - 2 sets - 10 reps      Charges: Therex x 1, Manual x 1  Total Timed Treatment: 28 min  Total Treatment Time: 28 min. Session shortened due to pt's schedule availability today.

## 2023-11-07 ENCOUNTER — TELEPHONE (OUTPATIENT)
Dept: PHYSICAL THERAPY | Age: 68
End: 2023-11-07

## 2023-11-08 ENCOUNTER — OFFICE VISIT (OUTPATIENT)
Dept: PHYSICAL THERAPY | Age: 68
End: 2023-11-08
Attending: ORTHOPAEDIC SURGERY
Payer: COMMERCIAL

## 2023-11-08 PROCEDURE — 97110 THERAPEUTIC EXERCISES: CPT

## 2023-11-08 PROCEDURE — 97140 MANUAL THERAPY 1/> REGIONS: CPT

## 2023-11-08 NOTE — PROGRESS NOTES
Dx: R JESSICA on 9/12/2023         Authorized # of Visits:  60 visit limit         Next MD visit: 12/4/2023  Fall Risk: standard         Precautions: Ant approach JESSICA precautions - no hip hyperextension. Subjective: Pt reports still some soreness lingering from the weekend workout, limping a little today, tightness to muscles - \"overdoing a little\". Last visit helpful to thigh    Pain: 0/10    Objective:   10/18/2023: scar healing WNL with no s/s infection noted. Mostly fully closed other than small scabbing on most proximal portion of incision  0/9/2023: reduced redness/ irritation to most proximal portion of incision, most steri strips have been removed, only distal remains; incision closed without any red streaking with varied scabbing present  10/4/2023: mild redness/ irritation? To most proximal portion of incision. No red streaking  10/2/2023: incision covered with partially intact steri strips. No red streaking. Sensation grossly intact surrounding incision. + mild redness/ irritation? To most proximal portion of incision      Assessment: Pt denied need for PT to visually assess scar today. Pt reported relief with PROM to R hip ER with gentle stretching taken to pt's comfort in tolerable range. Reduced antalgia noted in clinic when ambulating at end of session.       Goals: (To be met in 12 visits)   Pt will have improved hip AROM Flex to 110 deg and ABD to 25 deg to be able to don/doff shoes and perform car transfers without difficulty  Pt will improve hip ABD and ER strength to 4+/5 to increase ease with standing and walking   Pt will demonstrate improved SLS to >10 seconds NAVA to promote safety and decrease risk of falls on uneven surfaces such as grass  Pt will perform TUG in <9 seconds, demonstrating improved gait speed for improved participation in ADL such as community ambulation  Pt will be able to squat to  light objects around the house with < 1/10 hip pain   Pt will improve functional hip strength to report ability to ascend/descend 1 flight of stairs reciprocally without use of handrail  Pt will be independent and compliant with comprehensive HEP to maintain progress achieved in PT       Plan: Continue to progress with hip flexibility and strengthening exercises per tolerance. Progressing towards discharge. Pt unable to come in next week due to travel but was advised to reach out to PT with any questions/ concerns via [employer] PN next visit (12/12)  Date: 9/28/2023  TX#: 2/12 Date: 10/2/2023  TX#: 3/12   Date: 10/4/2023  TX#: 4/12 Date: 10/9/2023  TX#: 5/12 Date: 10/11/2023  TX#: 6/12 Date: 10/16/2023  TX#: 7/12 Date: 10/18/2023  TX#: 8/12 Date: 11/1/2023  Tx#: 9/12 Date: 11/6/2023  Tx#: 10/12 Date: 11/8/2023  Tx#: 11/12   THERE EX 45  MINS Therex: 40' Therex: 30' Therex: 39' Therex: 12'   Therex: 43' Therex: 23' Therex: 39' Therex: 12' Therex: 20'    PROM R hip to tolerance with precautions maintained. X 8' Education: incision - reach out to surgical team Activity modifications - PT recommendations for exercise & mobility to reduce pain, icing PROM: R hip to tolerance with precautions maintained. Educate: More intervals of ex's/ day vs 1 longer interval to prevent increased feeling of stiffness  HEP review/ check in - frequency, intensity  POC - moving forward with discharge planning  Recumbent Bike L10 x 5'  Education: DOMS  Education: seated LE mobility while on flight, get up mid flight to stretch LE   Recumbent bike X 5 mins Recumbent bike X 5 mins.  No resistance, seat 12 NuStep L1 X 5', seat 10 NuStep L2 x 5', seat 13  Calf raises - instruct for HEP Educate: reduce intensity of stretching to prevent aggravation of sxs NuStep L4 x 6'  Education: scar: see above Slantboard 2-way calf stretch: 3x30\" ea B    Gentle PROM hip flex, IR, ER, abduction Slantboard 2-way calf stretch: 3x30\" ea B    Gentle PROM hip flex, IR, ER   Slant board stretch 30 sec X 3 Slant board stretch 30 sec X 3 Slant board stretch 30 sec X 3   hooklying PPT x 30 Standing Hip Abd ana cristina with yellow ball at wall, x 20 ea Prone quad stretch with two rolled towels & strap (self-performed) 6x30\" Form check - HEP ex's (standing)    Step up fwd 6\" 2 X 10 Step up fwd 6\" 2 X 10  Instruction in active HS stretch & ankle pump (for more neural tension)  Educate: heat vs cold. Wait until at least 6 wks post-op to add heat - talk with surgeon       Step up lat 6\" 2 X 10 Step up lat 6\" 2 X 10  Instruction in [facility] stretch    Prone hip ER ana cristina with yellow ball squeeze, 20x5\"     Step down dip 4\" 2 X 10 with UE support Step down dip 4\" 2 X 10 with UE support  Instruction in seated HS stretch   Seated Hip ER ana cristina with yellow ball squeeze, 5\" x 2x15  Clams 2x10  Reverse clams 2x10     Supine clams RTB 2 X 10 Supine clams RTB 2 X 10 S/L Clams 3x10 no added resistance Instruction in flat back stretch @ // bars  Retro walk in // bars x 4 laps Hip Abd Ana Cristina w/ purple pilates ring,  5\" x 2x15 hooklying      Side lying hip abd 2 X 10  Modified anaid hip flexor/ quad stretch: tableside x 3' (with PT control of RLE, 2 folded towels under thigh to control ext) Modified anaid hip flexor/ quad stretch: tableside x 3' (with PT control of RLE, 2 folded towels under thigh to control ext)  Modified anaid hip flexor/ quad stretch: tableside x 3' (with PT control of RLE, 2 folded towels under thigh to control ext) - add to HEP       Heel slides on board X 20 Heel slides on board X 30 Standing hip Abd no added resistance x 10 ea Prone manual quad stretch 5x30\" with pillow under abdomen/ pelvis  Prone manual quad stretch 5x30\" with pillow under abdomen/ pelvis  Instruct in prone over 1 pillow laying for HEP with glute sets         Bridges 2x10 Education in massage roller ball for travel  Form check: squats  -squeeze glutes when come back up - return to neutral         SB HS curl with Q/S in ext, 3x10 Education: scar mobs when ready. Vitamin e, cocoa butter. Will revisit this when appropriate  Standing hip Abd form check: YTB at ankles 2x15 ea  Flex 2x15 ea YTB at ankles       MAN THERE 15 MINS  Manual Therapy: 15'  Manual Therapy: 13'  Manual Therapy: 17'  Manual Therapy: 16' Manual Therapy: 15'   Roller to R thigh  Roller to R thigh & manual STM to quad/ ITB/ TFL, distal adductors  Manual STM to R quad, ITB, glutes, piriformis, TFL, gentle to scar (over clothing)  Scar assess - with scar mob over clothing (educate in use of vitamin e/ cocoa butter for self scar mobs when cleared by PT - hold off for now)  Gentle STM R TFL & prox rectus  Manual STM to R quad, ITB, TFL, gentle to scar (over clothing) Manual STM to R quad, ITB, TFL, gentle to scar (over clothing)   Removed adhesive around incision site. Access Code: [email or web address]  URL: [email or web address]  Date: 10/09/2023  Prepared by: Chace Nett    Exercises  - Seated Hamstring Stretch  - 1 x daily - 4-5 x weekly - 3 sets - 20-30 sec hold  - Hooklying Active Hamstring Stretch  - 1 x daily - 4-5 x weekly - 1 sets - 10 reps - 10 sec hold  - Hooklying Single Knee to Chest Stretch  - 1 x daily - 4-5 x weekly - 3 sets - 30 sec hold  - Supine Sciatic Nerve Glide  - 1 x daily - 4-5 x weekly - 2 sets - 10 reps  - Standing 'L' Stretch at Counter  - 1 x daily - 4-5 x weekly - 3 sets - 20-30 sec hold  - Supine Bridge  - 1 x daily - 3 x weekly - 2-3 sets - 10 reps    Access Code: [email or web address]  URL: [email or web address]  Date: 10/16/2023  Prepared by: Chace Nett    Exercises  - Modified Gianni Stretch  - 1-2 x daily - 4 x weekly - 2-3 minutes hold  - Lying Prone with 1 Pillow  - 1-2 x daily - 7 x weekly - 2 sets - 10 reps      Charges:  Therex x 1, Manual x 1  Total Timed Treatment: 35 min  Total Treatment Time: 35 min

## 2023-11-16 ENCOUNTER — TELEPHONE (OUTPATIENT)
Dept: PHYSICAL THERAPY | Age: 68
End: 2023-11-16

## 2023-11-20 ENCOUNTER — TELEPHONE (OUTPATIENT)
Dept: PHYSICAL THERAPY | Facility: HOSPITAL | Age: 68
End: 2023-11-20

## 2023-11-20 ENCOUNTER — APPOINTMENT (OUTPATIENT)
Dept: PHYSICAL THERAPY | Age: 68
End: 2023-11-20
Attending: ORTHOPAEDIC SURGERY
Payer: COMMERCIAL

## 2023-11-21 ENCOUNTER — APPOINTMENT (OUTPATIENT)
Dept: PHYSICAL THERAPY | Age: 68
End: 2023-11-21
Attending: ORTHOPAEDIC SURGERY
Payer: COMMERCIAL

## 2023-12-01 ENCOUNTER — TELEPHONE (OUTPATIENT)
Dept: ORTHOPEDICS CLINIC | Facility: CLINIC | Age: 68
End: 2023-12-01

## 2023-12-01 DIAGNOSIS — Z96.641 STATUS POST RIGHT HIP REPLACEMENT: Primary | ICD-10-CM

## 2023-12-01 DIAGNOSIS — I10 ESSENTIAL HYPERTENSION, BENIGN: ICD-10-CM

## 2023-12-01 RX ORDER — AMLODIPINE BESYLATE 5 MG/1
5 TABLET ORAL DAILY
Qty: 90 TABLET | Refills: 1 | Status: SHIPPED | OUTPATIENT
Start: 2023-12-01

## 2023-12-01 NOTE — TELEPHONE ENCOUNTER
Xrays ordered for upcoming appointment    Please call patient to come 15-20 minutes earlier to complete images

## 2023-12-01 NOTE — TELEPHONE ENCOUNTER
Requested Prescriptions     Pending Prescriptions Disp Refills    AMLODIPINE 5 MG Oral Tab [Pharmacy Med Name: AMLODIPINE BESYLATE 5MG TABLETS] 90 tablet 1     Sig: TAKE 1 TABLET(5 MG) BY MOUTH DAILY     LOV 9/19/2023     Patient was asked to follow-up in: Follow-up not documented in note    Appointment scheduled: Visit date not found     Medication refilled per protocol.

## 2023-12-04 ENCOUNTER — OFFICE VISIT (OUTPATIENT)
Dept: ORTHOPEDICS CLINIC | Facility: CLINIC | Age: 68
End: 2023-12-04
Payer: COMMERCIAL

## 2023-12-04 ENCOUNTER — HOSPITAL ENCOUNTER (OUTPATIENT)
Dept: GENERAL RADIOLOGY | Age: 68
Discharge: HOME | End: 2023-12-04
Attending: ORTHOPAEDIC SURGERY
Payer: COMMERCIAL

## 2023-12-04 ENCOUNTER — OFFICE VISIT (OUTPATIENT)
Dept: PHYSICAL THERAPY | Age: 68
End: 2023-12-04
Attending: ORTHOPAEDIC SURGERY
Payer: COMMERCIAL

## 2023-12-04 DIAGNOSIS — Z96.641 STATUS POST RIGHT HIP REPLACEMENT: Primary | ICD-10-CM

## 2023-12-04 DIAGNOSIS — Z96.641 STATUS POST RIGHT HIP REPLACEMENT: ICD-10-CM

## 2023-12-04 PROCEDURE — 97110 THERAPEUTIC EXERCISES: CPT

## 2023-12-04 PROCEDURE — 73502 X-RAY EXAM HIP UNI 2-3 VIEWS: CPT | Performed by: ORTHOPAEDIC SURGERY

## 2023-12-04 PROCEDURE — 99024 POSTOP FOLLOW-UP VISIT: CPT | Performed by: ORTHOPAEDIC SURGERY

## 2023-12-04 NOTE — PROGRESS NOTES
Dx: R JESSICA on 9/12/2023         Authorized # of Visits:  60 visit limit         Next MD visit: 12/4/2023  Fall Risk: standard         Precautions: Ant approach JESSICA precautions - no hip hyperextension. Discharge Summary  Pt has attended 12 visits in Physical Therapy. Subjective: Pt reports he pulled a muscle/ rib a few weeks ago on the R so has been dealing with this, slowing him down. Did go-kart around this time but does not recall any issues. Hip has been doing okay; sees the surgeon this afternoon. ROM getting better a little at a time. Still challenged with donning sock - biggest thing he is having problems with. Reports tightness vs pain, hip stiff in the morning when getting out of bed; needs to take ibuprofen at night to be okay in the morning. A little stiff on the flights but able to walk it off slowly. Standing & walking is fine including on uneven surfaces; no pain with squatting but some tightness (able to squat but takes it slow); able to negotiate stairs reciprocally. Pain: 0/10    Objective:  12/4/2023:  Observation/Skin: no AD, able to stand & complete bed mobility indep without any guarding  Palpation: no tenderness along R hip incision, mild STRs noted. TTP along R lateral ribcage wall    AROM: (* denotes performed with pain)  Hip   Flexion: R 110 deg  Abduction: R 25 deg  ER: R 20 deg  IR: R 28     Strength/MMT: (* denotes performed with pain)  Hip   Flexion: R 5/5  Extension: R 4/5  Abduction: R 4/5  ER: R 4+/5  IR: R 5/5     Balance: SLS: > 30 sec R SLS    Functional Mobility:  5x sit<>stand: 10 sec  TUG (no AD, time): 9 sec    Gait: pt ambulates on level ground with WNL gait pattern without AD      Assessment: Pt has completed 12/12 visits of skilled PT. Pt will be 12 weeks post-op tomorrow. Pt has improved in function & on therapy goal completion. Pt reports feeling ready for discharge. All questions answered. PT is in agreement for discharge if deemed appropriate per surgeon. Pt has been advised that if symptoms persist or increase despite adherence to HEP, pt may be able to resume skilled PT in the future. Pt is in agreement to plan. Thank you. (Of note, pt was also recommended to discuss recent onset of rib pain with surgeon for assessment.)      Goals: (To be met in 12 visits)   Pt will have improved hip AROM Flex to 110 deg and ABD to 25 deg to be able to don/doff shoes and perform car transfers without difficulty - improved  Pt will improve hip ABD and ER strength to 4+/5 to increase ease with standing and walking - improved  Pt will demonstrate improved SLS to >10 seconds NAVA to promote safety and decrease risk of falls on uneven surfaces such as grass - MET on RLE, NT on the LLE  Pt will perform TUG in <9 seconds, demonstrating improved gait speed for improved participation in ADL such as community ambulation- MET (9 sec)  Pt will be able to squat to  light objects around the house with < 1/10 hip pain - MET  Pt will improve functional hip strength to report ability to ascend/descend 1 flight of stairs reciprocally without use of handrail - MET  Pt will be independent and compliant with comprehensive HEP to maintain progress achieved in PT - MET    Plan: Tentative discharge to HEP; awaiting consult with surgeon this afternoon at post-op visit. If surgeon in agreement for discharge, pt will be discharged from current PT POC. LEFS Score  LEFS Score: 36.25 % (9/27/2023  1:21 PM)    Post LEFS Score  Post LEFS Score: 78.75 % (12/4/2023 10:12 AM)    42.5 % improvement    Patient/Family/Caregiver was advised of these findings, precautions, and treatment options and has agreed to actively participate in planning and for this course of care. Thank you for your referral. If you have any questions, please contact me at Dept: [phone].     Sincerely,  Electronically signed by therapist: Olivia Oviedo PT     Physician's certification required:  Yes  Please co-sign or sign and return this letter via fax as soon as possible to [phone]. I certify the need for these services furnished under this plan of treatment and while under my care. X___________________________________________________ Date____________________    Certification From: 68/9/1032  To:3/3/2024     Date: 10/18/2023  TX#: 8/12 Date: 11/1/2023  Tx#: 9/12 Date: 11/6/2023  Tx#: 10/12 Date: 11/8/2023  Tx#: 11/12 Date: 12/4/2023  Tx#: 12/12  Discharge Note   Therex: 23' Therex: 39' Therex: 12' Therex: 21' Therex: 28'    HEP review/ check in - frequency, intensity  POC - moving forward with discharge planning  Recumbent Bike L10 x 5'  Education: DOMS  Education: seated LE mobility while on flight, get up mid flight to stretch LE NuStep L6 x 5'  Re-assessment  Discharge Instructions  HEP review  Post LEFS survey with education   Educate: reduce intensity of stretching to prevent aggravation of sxs NuStep L4 x 6'  Education: scar: see above Slantboard 2-way calf stretch: 3x30\" ea B    Gentle PROM hip flex, IR, ER, abduction Slantboard 2-way calf stretch: 3x30\" ea B    Gentle PROM hip flex, IR, ER    Standing Hip Abd ana cristina with yellow ball at wall, x 20 ea Prone quad stretch with two rolled towels & strap (self-performed) 6x30\" Form check - HEP ex's (standing)             Prone hip ER ana cristina with yellow ball squeeze, 20x5\"      Seated Hip ER ana cristina with yellow ball squeeze, 5\" x 2x15  Clams 2x10  Reverse clams 2x10      Hip Abd Ana Cristina w/ purple pilates ring,  5\" x 2x15 hooklying       Manual Therapy: 17'  Manual Therapy: 16' Manual Therapy: 15'    Scar assess - with scar mob over clothing (educate in use of vitamin e/ cocoa butter for self scar mobs when cleared by PT - hold off for now)  Gentle STM R TFL & prox rectus  Manual STM to R quad, ITB, TFL, gentle to scar (over clothing) Manual STM to R quad, ITB, TFL, gentle to scar (over clothing)      Access Code: [email or web address]  URL: [email or web address]  Date: 10/09/2023  Prepared by: Raad Collier    Exercises  - Seated Hamstring Stretch  - 1 x daily - 4-5 x weekly - 3 sets - 20-30 sec hold  - Hooklying Active Hamstring Stretch  - 1 x daily - 4-5 x weekly - 1 sets - 10 reps - 10 sec hold  - Hooklying Single Knee to Chest Stretch  - 1 x daily - 4-5 x weekly - 3 sets - 30 sec hold  - Supine Sciatic Nerve Glide  - 1 x daily - 4-5 x weekly - 2 sets - 10 reps  - Standing 'L' Stretch at Counter  - 1 x daily - 4-5 x weekly - 3 sets - 20-30 sec hold  - Supine Bridge  - 1 x daily - 3 x weekly - 2-3 sets - 10 reps    Access Code: [email or web address]  URL: [email or web address]  Date: 10/16/2023  Prepared by: Raad Collier    Exercises  - Modified Gianni Stretch  - 1-2 x daily - 4 x weekly - 2-3 minutes hold  - Lying Prone with 1 Pillow  - 1-2 x daily - 7 x weekly - 2 sets - 10 reps    Charges:  Therex x 2  Total Timed Treatment: 35 min  Total Treatment Time: 35 min

## 2023-12-04 NOTE — PROGRESS NOTES
[facility] Progress Note    Subjective: Patient returns to clinic 3 months postop from right hip replacement. Reports that he continues to improve, largely doing well, has been back to low impact exercises. He does get some stiffness/discomfort with first few steps that resolves after walking a short distance. He has no issues with the incision, no complaints today. He was on go carts recently, hit his right side of his abdomen/thorax, has some pain that radiates into his back. Objective: Patient is very pleasant, appears comfortable. He is ambulating with a nonantalgic gait. Imaging: X-rays of the right hip and pelvis personally viewed, independently interpreted and radiology report read. Demonstrates uncemented right total hip arthroplasty unchanged from previous films 6 weeks ago. Assessment/Plan: 59-year-old male 3 months postop from right hip replacement. Doing very well from the hip replacement standpoint. Advised follow-up at 1 year from date of surgery with repeat x-rays or contact us sooner if needed. Did discuss local modalities for symptoms of right sided rib/thorax pain. If symptoms persist, he can contact us for direction towards physiatry team.  Otherwise follow-up at 1 year from surgery or contact us sooner if needed.     Geovanna Crews MD, [address] Orthopedic Surgery  Phone [phone]  Fax [phone]

## 2023-12-06 ENCOUNTER — APPOINTMENT (OUTPATIENT)
Dept: PHYSICAL THERAPY | Age: 68
End: 2023-12-06
Attending: ORTHOPAEDIC SURGERY
Payer: COMMERCIAL

## 2024-02-18 DIAGNOSIS — Z00.00 ROUTINE GENERAL MEDICAL EXAMINATION AT A HEALTH CARE FACILITY: ICD-10-CM

## 2024-02-19 RX ORDER — FAMCICLOVIR 500 MG/1
500 TABLET ORAL 3 TIMES DAILY
Qty: 42 TABLET | Refills: 5 | Status: SHIPPED | OUTPATIENT
Start: 2024-02-19

## 2024-02-19 NOTE — TELEPHONE ENCOUNTER
Refill request for:    Requested Prescriptions     Pending Prescriptions Disp Refills    FAMCICLOVIR 500 MG Oral Tab [Pharmacy Med Name: FAMCICLOVIR 500MG TABLETS] 42 tablet 5     Sig: TAKE 1 TABLET(500 MG) BY MOUTH THREE TIMES DAILY        Last Prescribed Quantity Refills   9/8/22 42 5     LOV 9/19/2023     Patient was asked to follow-up in: not documented in note.    Appointment scheduled: Visit date not found    Medication not on protocol.     Routed to Martin Anton MD for review

## 2024-06-11 ENCOUNTER — TELEPHONE (OUTPATIENT)
Dept: ORTHOPEDICS CLINIC | Facility: CLINIC | Age: 69
End: 2024-06-11

## 2024-06-11 DIAGNOSIS — M25.551 RIGHT HIP PAIN: Primary | ICD-10-CM

## 2024-06-11 NOTE — TELEPHONE ENCOUNTER
Pt is seeing Dr. Hansen for right hip pain. Please advise if imaging is needed.   Future Appointments   Date Time Provider Department Center   7/29/2024  8:00 AM Jatinder Hansen MD [facility] [address]

## 2024-06-12 NOTE — TELEPHONE ENCOUNTER
Future Appointments   Date Time Provider Department Center   7/29/2024  8:00 AM Jatinder Hansen MD [facility] [address]       LMOM for patient to schedule imaging or come in prior to appt.

## 2024-06-30 ENCOUNTER — TELEPHONE (OUTPATIENT)
Dept: FAMILY MEDICINE CLINIC | Facility: CLINIC | Age: 69
End: 2024-06-30

## 2024-06-30 DIAGNOSIS — I10 ESSENTIAL HYPERTENSION, BENIGN: ICD-10-CM

## 2024-07-01 NOTE — TELEPHONE ENCOUNTER
Requested Prescriptions     Pending Prescriptions Disp Refills    AMLODIPINE 5 MG Oral Tab [Pharmacy Med Name: AMLODIPINE BESYLATE 5MG TABLETS] 90 tablet 1     Sig: TAKE 1 TABLET(5 MG) BY MOUTH DAILY     LOV 9/19/2023     Patient was asked to follow-up in: 1 year    Appointment scheduled: Visit date not found     Medication refilled per protocol.    Routed to . Please assist pt with an appt. Pt is due for annual physical. Per Dr. Anton note on 3/29/2022 would like to see pt annually.

## 2024-07-02 ENCOUNTER — TELEPHONE (OUTPATIENT)
Dept: FAMILY MEDICINE CLINIC | Facility: CLINIC | Age: 69
End: 2024-07-02

## 2024-07-02 DIAGNOSIS — E55.9 VITAMIN D DEFICIENCY: Primary | ICD-10-CM

## 2024-07-02 DIAGNOSIS — I10 ESSENTIAL HYPERTENSION: ICD-10-CM

## 2024-07-02 DIAGNOSIS — Z00.00 LABORATORY EXAM ORDERED AS PART OF ROUTINE GENERAL MEDICAL EXAMINATION: ICD-10-CM

## 2024-07-02 NOTE — TELEPHONE ENCOUNTER
Please enter lab orders for the patient's upcoming physical appointment.     Physical scheduled:   Your appointments       Date & Time Appointment Department ([place])    Aug 05, 2024 2:45 PM CDT Physical - Established with Martin Anton MD [facility] ([facility])              [facility]  [address]  [facility] [address]  [phone]           Preferred lab: [facility]     The patient has been notified to complete fasting labs prior to their physical appointment.

## 2024-07-03 ENCOUNTER — TELEPHONE (OUTPATIENT)
Dept: FAMILY MEDICINE CLINIC | Facility: CLINIC | Age: 69
End: 2024-07-03

## 2024-07-03 DIAGNOSIS — I10 ESSENTIAL HYPERTENSION, BENIGN: ICD-10-CM

## 2024-07-03 RX ORDER — AMLODIPINE BESYLATE 5 MG/1
5 TABLET ORAL DAILY
Qty: 90 TABLET | Refills: 1 | Status: SHIPPED | OUTPATIENT
Start: 2024-07-03

## 2024-07-03 RX ORDER — AMLODIPINE BESYLATE 5 MG/1
5 TABLET ORAL DAILY
Qty: 90 TABLET | Refills: 1 | OUTPATIENT
Start: 2024-07-03

## 2024-07-03 NOTE — TELEPHONE ENCOUNTER
Patient requesting medication refill on   AMLODIPINE 5 MG Oral Tab   Patient stated that on Friday will be out of medication.      Patient call pharmacy theres no more refill left       Send to   [id number] - [place], [place] - [address] AT [facility] [address]

## 2024-07-10 ENCOUNTER — TELEPHONE (OUTPATIENT)
Dept: FAMILY MEDICINE CLINIC | Facility: CLINIC | Age: 69
End: 2024-07-10

## 2024-07-10 DIAGNOSIS — Z00.00 LABORATORY EXAM ORDERED AS PART OF ROUTINE GENERAL MEDICAL EXAMINATION: Primary | ICD-10-CM

## 2024-07-10 NOTE — TELEPHONE ENCOUNTER
Please enter lab orders for the patient's upcoming physical appointment.     Physical scheduled:   Your appointments       Date & Time Appointment Department ([place])    Aug 05, 2024 2:45 PM CDT Physical - Established with Martin Anton MD [facility] ([facility])              [facility]  [address]  [facility] [address]  [phone]           Preferred lab: [facility]     The patient has been notified to complete fasting labs prior to their physical appointment.

## 2024-07-12 ENCOUNTER — HOSPITAL ENCOUNTER (OUTPATIENT)
Dept: GENERAL RADIOLOGY | Age: 69
Discharge: HOME | End: 2024-07-12
Attending: ORTHOPAEDIC SURGERY
Payer: COMMERCIAL

## 2024-07-12 DIAGNOSIS — M25.551 RIGHT HIP PAIN: ICD-10-CM

## 2024-07-12 PROCEDURE — 73502 X-RAY EXAM HIP UNI 2-3 VIEWS: CPT | Performed by: ORTHOPAEDIC SURGERY

## 2024-07-15 LAB
ABSOLUTE BASOPHILS: 41 CELLS/UL (ref 0–200)
ABSOLUTE EOSINOPHILS: 191 CELLS/UL (ref 15–500)
ABSOLUTE LYMPHOCYTES: 1363 CELLS/UL (ref 850–3900)
ABSOLUTE MONOCYTES: 452 CELLS/UL (ref 200–950)
ABSOLUTE NEUTROPHILS: 3753 CELLS/UL (ref 1500–7800)
ALBUMIN/GLOBULIN RATIO: 2 (CALC) (ref 1–2.5)
ALBUMIN: 4.8 G/DL (ref 3.6–5.1)
ALKALINE PHOSPHATASE: 63 U/L (ref 35–144)
ALT: 16 U/L (ref 9–46)
AST: 19 U/L (ref 10–35)
BASOPHILS: 0.7 %
BILIRUBIN, TOTAL: 0.9 MG/DL (ref 0.2–1.2)
BUN: 18 MG/DL (ref 7–25)
CALCIUM: 9.9 MG/DL (ref 8.6–10.3)
CARBON DIOXIDE: 29 MMOL/L (ref 20–32)
CHLORIDE: 101 MMOL/L (ref 98–110)
CHOL/HDLC RATIO: 4.5 (CALC)
CHOLESTEROL, TOTAL: 188 MG/DL
CREATININE: 1.05 MG/DL (ref 0.7–1.35)
EGFR: 77 ML/MIN/1.73M2
EOSINOPHILS: 3.3 %
GLOBULIN: 2.4 G/DL (CALC) (ref 1.9–3.7)
GLUCOSE: 94 MG/DL (ref 65–99)
HDL CHOLESTEROL: 42 MG/DL
HEMATOCRIT: 44.1 % (ref 38.5–50)
HEMOGLOBIN A1C: 5.6 % OF TOTAL HGB
HEMOGLOBIN: 14.6 G/DL (ref 13.2–17.1)
LDL-CHOLESTEROL: 123 MG/DL (CALC)
LYMPHOCYTES: 23.5 %
MCH: 29.3 PG (ref 27–33)
MCHC: 33.1 G/DL (ref 32–36)
MCV: 88.4 FL (ref 80–100)
MONOCYTES: 7.8 %
MPV: 11.5 FL (ref 7.5–12.5)
NEUTROPHILS: 64.7 %
NON-HDL CHOLESTEROL: 146 MG/DL (CALC)
PLATELET COUNT: 196 THOUSAND/UL (ref 140–400)
POTASSIUM: 4.5 MMOL/L (ref 3.5–5.3)
PROTEIN, TOTAL: 7.2 G/DL (ref 6.1–8.1)
RDW: 13.1 % (ref 11–15)
RED BLOOD CELL COUNT: 4.99 MILLION/UL (ref 4.2–5.8)
SODIUM: 139 MMOL/L (ref 135–146)
TOTAL PSA: 2.2 NG/ML
TRIGLYCERIDES: 115 MG/DL
TSH W/REFLEX TO FT4: 0.95 MIU/L (ref 0.4–4.5)
VITAMIN D, 25-OH, TOTAL: 55 NG/ML (ref 30–100)
WHITE BLOOD CELL COUNT: 5.8 THOUSAND/UL (ref 3.8–10.8)

## 2024-07-29 ENCOUNTER — OFFICE VISIT (OUTPATIENT)
Facility: CLINIC | Age: 69
End: 2024-07-29
Payer: COMMERCIAL

## 2024-07-29 VITALS — WEIGHT: 207 LBS | HEIGHT: 71.5 IN | BODY MASS INDEX: 28.35 KG/M2

## 2024-07-29 DIAGNOSIS — Z96.641 STATUS POST RIGHT HIP REPLACEMENT: Primary | ICD-10-CM

## 2024-07-29 PROCEDURE — 99213 OFFICE O/P EST LOW 20 MIN: CPT | Performed by: ORTHOPAEDIC SURGERY

## 2024-07-29 NOTE — PROGRESS NOTES
[facility] Progress Note    Subjective: Very pleasant 68-year-old male returns today for right hip following replacement 1 year ago.  For the most part he is doing well, however he does report that he has symptoms when he attempts to lift heavier items such as a 40 to 50 pound bag of salt.  He notes that as he lifts the item, he feels like the right thigh/hip just gives out, and he has difficulty getting it to move and bear weight.  He denies pain, but states it feels like it just will not work.  He notes that as soon as he puts the item down, he is able to ambulate fine without difficulty.  He denies any symptoms while ambulating on level ground, or with exercise (doing some lifting as well as biking), and states that he is doing stairs fine as well.  He reports that is not painful, denies numbness tingling or burning.  No radiation of symptoms.  Otherwise he is doing fine with the hip, but he was concerned whether he was doing any damage to the hip replacement.  He does note that he had a trip to the [facility] for his 69th [facility] along with his wife's 70[age] birthday and they brought their grandchildren.    Objective: Patient appears comfortable, fit, very pleasant.  Right hip incision is well-healed.  No masses, fluid or fluctuance.  Ambulates with nonantalgic gait.      Imaging: X-rays of the right hip and pelvis personally viewed, independently interpreted and radiology report read.  These were compared to films from last year.  Demonstrates uncemented right total hip arthroplasty.  Cup is unchanged in position, no eccentric positioning of the femoral head within the cup.  Stem position unchanged, today's AP hip x-ray is more internally rotated compared to last years, there is no calcar rounding, but there is increased trabecular density in the area of the calcar and there is absence of reactive lines around proximal ingrowth portion of the stem, and cortical thickening with possible spot welding near  the inferior portion of the ingrowth surface laterally.      Assessment/Plan: 68-year-old male 1 year postop status post anterior right total hip arthroplasty.  Doing very well for the most part.  He does have some symptoms with lifting heavier items, such as 40 to 50 pound bags of salt.  Did  on nuisance symptoms following hip replacement, although it is not really pain/bruise or soreness that he is reporting rather that the hip tends to \"not work\" when he is lifting heavier items.  Reassured that the implant itself appears in good condition, no signs of failure, and that he may continue working through these episodes to improve his strength and stamina.  He expressed understanding and agreement with this discussion and plan.  He is going to continue with his exercise program.  Advised follow-up in 5 to 10 years with repeat x-rays or contact us sooner if needed    Jatinder Hansen MD, FAAOS  [facility]  Phone [phone]  Fax [phone]

## 2024-08-28 ENCOUNTER — OFFICE VISIT (OUTPATIENT)
Dept: FAMILY MEDICINE CLINIC | Facility: CLINIC | Age: 69
End: 2024-08-28
Payer: COMMERCIAL

## 2024-08-28 VITALS
HEIGHT: 71.5 IN | OXYGEN SATURATION: 97 % | HEART RATE: 64 BPM | WEIGHT: 206.5 LBS | SYSTOLIC BLOOD PRESSURE: 130 MMHG | BODY MASS INDEX: 28.28 KG/M2 | RESPIRATION RATE: 16 BRPM | DIASTOLIC BLOOD PRESSURE: 70 MMHG

## 2024-08-28 DIAGNOSIS — I47.10 SVT (SUPRAVENTRICULAR TACHYCARDIA) (HCC): ICD-10-CM

## 2024-08-28 DIAGNOSIS — Z23 NEED FOR PNEUMOCOCCAL 20-VALENT CONJUGATE VACCINATION: ICD-10-CM

## 2024-08-28 DIAGNOSIS — R23.9 SKIN CHANGE: ICD-10-CM

## 2024-08-28 DIAGNOSIS — Z00.00 ANNUAL PHYSICAL EXAM: Primary | ICD-10-CM

## 2024-08-28 DIAGNOSIS — E55.9 VITAMIN D DEFICIENCY: ICD-10-CM

## 2024-08-28 DIAGNOSIS — I10 ESSENTIAL HYPERTENSION: ICD-10-CM

## 2024-08-28 PROCEDURE — 90677 PCV20 VACCINE IM: CPT | Performed by: FAMILY MEDICINE

## 2024-08-28 PROCEDURE — 99397 PER PM REEVAL EST PAT 65+ YR: CPT | Performed by: FAMILY MEDICINE

## 2024-08-28 PROCEDURE — 90471 IMMUNIZATION ADMIN: CPT | Performed by: FAMILY MEDICINE

## 2024-08-28 NOTE — PROGRESS NOTES
Chief Complaint   Patient presents with    Well Adult     Patient here for physical       HPI:   Femi Paul is a 68 year old male who presents for a complete physical exam.     Last colonoscopy:  2/2016.  Repeat 10yrs.    Last PSA:  2.2  Immunizations: shingrix UTD x 2.  TDaP 2019.  PNA 13 in 2020.      HTN - on amlodipine.  Taking 5mg daily.  BP controlled    Vit D - normal on recent labs.     Wt Readings from Last 6 Encounters:   08/28/24 206 lb 8 oz (93.7 kg)   07/29/24 207 lb (93.9 kg)   10/21/23 205 lb (93 kg)   09/19/23 210 lb (95.3 kg)   09/12/23 204 lb (92.5 kg)   09/05/23 206 lb 6 oz (93.6 kg)     Body mass index is 28.4 kg/m².     Chemistry Labs:   Lab Results   Component Value Date/Time    GLU 94 07/12/2024 12:35 PM     07/12/2024 12:35 PM    K 4.5 07/12/2024 12:35 PM     07/12/2024 12:35 PM    CO2 29 07/12/2024 12:35 PM    CREATSERUM 1.05 07/12/2024 12:35 PM    CA 9.9 07/12/2024 12:35 PM    ALB 4.8 07/12/2024 12:35 PM    TP 7.2 07/12/2024 12:35 PM    ALKPHO 63 07/12/2024 12:35 PM    AST 19 07/12/2024 12:35 PM    ALT 16 07/12/2024 12:35 PM    BILT 0.9 07/12/2024 12:35 PM          Cholesterol  (most recent labs)   Lab Results   Component Value Date/Time    CHOLEST 188 07/12/2024 12:35 PM    HDL 42 07/12/2024 12:35 PM     (H) 07/12/2024 12:35 PM    TRIG 115 07/12/2024 12:35 PM        PSA (ng/mL)   Date Value   03/26/2022 2.06   09/22/2020 1.50         Current Outpatient Medications   Medication Sig Dispense Refill    amLODIPine 5 MG Oral Tab Take 1 tablet (5 mg total) by mouth daily. 90 tablet 1    famciclovir 500 MG Oral Tab Take 1 tablet (500 mg total) by mouth 3 (three) times daily. 42 tablet 5      Past Medical History:    Calculus of kidney    Essential hypertension    Essential hypertension    High blood pressure    History of COVID-19    Pt cant recall exact month. Had cold like S/S for a couple of days.    Osteoarthritis    Paroxysmal supraventricular tachycardia (HCC)     PONV (postoperative nausea and vomiting)    Visual impairment    glasses      Past Surgical History:   Procedure Laterality Date    Colonoscopy      Hip replacement surgery Right 09/12/2023    Neck/chest procedure unlisted  1988    right neck lymph    Other Right 2018    Right knee repair, not replacement.     Repair of nasal septum  1998      Family History   Problem Relation Age of Onset    Cancer Father         Lung      Social History:  Social History     Socioeconomic History    Marital status:    Tobacco Use    Smoking status: Never    Smokeless tobacco: Never   Vaping Use    Vaping status: Never Used   Substance and Sexual Activity    Alcohol use: Yes     Alcohol/week: 0.0 standard drinks of alcohol     Comment: 1 drink a week    Drug use: No   Other Topics Concern    Caffeine Concern Yes     Comment: 1 cup of coffee daily    Exercise Yes     Comment: 4-5 days a week    Seat Belt Yes      Occ:  at bank. Retiring end of 2025  : yes. Children: 4.   Exercise: 4-5 times a week.  Cardio, lifting.  Bike.   Diet: pretty healthy.  Home cooked.       REVIEW OF SYSTEMS:     All systems reviewed, negative other than noted above.    EXAM:   /70   Pulse 64   Resp 16   Ht 5' 11.5\" (1.816 m)   Wt 206 lb 8 oz (93.7 kg)   SpO2 97%   BMI 28.40 kg/m²   Body mass index is 28.4 kg/m².     General appearance: alert, appears stated age and cooperative  Eyes: conjunctivae/corneas clear. PERRL, EOM's intact.   Ears: normal TM's and external ear canals both ears  Neck: no adenopathy, no JVD, supple, symmetrical, trachea midline and thyroid not enlarged, symmetric, no tenderness/mass/nodules  Lungs: clear to auscultation bilaterally  Heart: S1, S2 normal, no murmur, click, rub or gallop, regular rate and rhythm  Abdomen: soft, non-tender; bowel sounds normal; no masses,  no organomegaly  Extremities: extremities normal, atraumatic, no cyanosis or edema  Pulses: 2+ and  symmetric  Neurologic: Alert and oriented X 3, normal strength and tone. Normal symmetric reflexes. Normal coordination and gait     ASSESSMENT AND PLAN:     Femi Paul was seen in the office today:  had concerns including Well Adult (Patient here for physical ).    1. Annual physical exam  Overall well  Healthy diet, exercise  C-scope UTD.  Repeat 2026  PSA normal  Immunization sUTD.      2. Vitamin D deficiency  Controlled on the labs     3. SVT (supraventricular tachycardia) (HCC)  RRR today.  Will continue to monitor for changes    4. Essential hypertension  On amlodipine 5mg.  BP controlled  Stable med    5. Skin change  Changes on back, cherry angiomas on belly.  Fair skin  Will refer to derm for check over  - DERM - INTERNAL    6. Need for pneumococcal 20-valent conjugate vaccination  given  - Prevnar 20 (PCV20) [74958]        Martin Anton M.D.   [facility] 3  08/28/24

## 2024-11-19 ENCOUNTER — TELEPHONE (OUTPATIENT)
Dept: FAMILY MEDICINE CLINIC | Facility: CLINIC | Age: 69
End: 2024-11-19

## 2024-11-19 NOTE — TELEPHONE ENCOUNTER
Received medical records request from [facility] requesting patient's medical records from the past 5 years. All records located in [facility] in which request was sent to [facility]. Contact Mendoza [phone]  Order ID# [id number]

## 2024-12-05 NOTE — PATIENT PROFILE ADULT. - NS PRO AD NO ADVANCE DIRECTIVE
----- Message from Hiram Gan PA-C sent at 11/27/2024 11:36 AM CST -----  Vitamin d is a little low - recommend 600 units/day supplement especially during winter months  
Tried to reach out to patients Mother to inform her of results no answer on either phone could leave a msg on either phone either. Will send a letter   
Yes

## 2024-12-15 DIAGNOSIS — I10 ESSENTIAL HYPERTENSION, BENIGN: ICD-10-CM

## 2024-12-16 RX ORDER — AMLODIPINE BESYLATE 5 MG/1
5 TABLET ORAL DAILY
Qty: 90 TABLET | Refills: 1 | Status: SHIPPED | OUTPATIENT
Start: 2024-12-16

## 2024-12-16 NOTE — TELEPHONE ENCOUNTER
Requested Prescriptions     Signed Prescriptions Disp Refills    AMLODIPINE 5 MG Oral Tab 90 tablet 1     Sig: TAKE 1 TABLET(5 MG) BY MOUTH DAILY     Authorizing Provider: YANICK DEVI     Ordering User: BRANDIN GUIDO      Refilled per protocol/OV notes

## 2025-01-08 ENCOUNTER — PATIENT MESSAGE (OUTPATIENT)
Facility: CLINIC | Age: 70
End: 2025-01-08

## 2025-05-26 ENCOUNTER — APPOINTMENT (OUTPATIENT)
Dept: CT IMAGING | Age: 70
End: 2025-05-26
Attending: EMERGENCY MEDICINE
Payer: COMMERCIAL

## 2025-05-26 ENCOUNTER — APPOINTMENT (OUTPATIENT)
Dept: GENERAL RADIOLOGY | Age: 70
End: 2025-05-26
Attending: EMERGENCY MEDICINE
Payer: COMMERCIAL

## 2025-05-26 ENCOUNTER — HOSPITAL ENCOUNTER (INPATIENT)
Facility: HOSPITAL | Age: 70
LOS: 3 days | Discharge: HOME | End: 2025-05-29
Attending: EMERGENCY MEDICINE | Admitting: HOSPITALIST
Payer: COMMERCIAL

## 2025-05-26 DIAGNOSIS — Z98.890 STATUS POST ENDOSCOPIC RETROGRADE CHOLANGIOPANCREATOGRAPHY: ICD-10-CM

## 2025-05-26 DIAGNOSIS — G89.18 POSTOPERATIVE PAIN: ICD-10-CM

## 2025-05-26 DIAGNOSIS — K80.50 CHOLEDOCHOLITHIASIS: Primary | ICD-10-CM

## 2025-05-26 LAB
ALBUMIN SERPL-MCNC: 5.6 G/DL (ref 3.2–4.8)
ALBUMIN/GLOB SERPL: 2.3 {RATIO} (ref 1–2)
ALP LIVER SERPL-CCNC: 117 U/L (ref 45–117)
ALT SERPL-CCNC: 80 U/L (ref 10–49)
ANION GAP SERPL CALC-SCNC: 9 MMOL/L (ref 0–18)
AST SERPL-CCNC: 39 U/L (ref ?–34)
BASOPHILS # BLD AUTO: 0.05 X10(3) UL (ref 0–0.2)
BASOPHILS NFR BLD AUTO: 0.6 %
BILIRUB SERPL-MCNC: 0.4 MG/DL (ref 0.2–1.1)
BILIRUB UR QL STRIP.AUTO: NEGATIVE
BUN BLD-MCNC: 25 MG/DL (ref 9–23)
CALCIUM BLD-MCNC: 10.3 MG/DL (ref 8.7–10.6)
CHLORIDE SERPL-SCNC: 103 MMOL/L (ref 98–112)
CLARITY UR REFRACT.AUTO: CLEAR
CO2 SERPL-SCNC: 26 MMOL/L (ref 21–32)
COLOR UR AUTO: YELLOW
CREAT BLD-MCNC: 1.14 MG/DL (ref 0.7–1.3)
EGFRCR SERPLBLD CKD-EPI 2021: 69 ML/MIN/1.73M2 (ref 60–?)
EOSINOPHIL # BLD AUTO: 0.19 X10(3) UL (ref 0–0.7)
EOSINOPHIL NFR BLD AUTO: 2.4 %
ERYTHROCYTE [DISTWIDTH] IN BLOOD BY AUTOMATED COUNT: 12.8 %
GLOBULIN PLAS-MCNC: 2.4 G/DL (ref 2–3.5)
GLUCOSE BLD-MCNC: 118 MG/DL (ref 70–99)
GLUCOSE UR STRIP.AUTO-MCNC: NEGATIVE MG/DL
HCT VFR BLD AUTO: 43.7 % (ref 39–53)
HGB BLD-MCNC: 15.1 G/DL (ref 13–17.5)
IMM GRANULOCYTES # BLD AUTO: 0.02 X10(3) UL (ref 0–1)
IMM GRANULOCYTES NFR BLD: 0.3 %
INR BLD: 0.99 (ref 0.8–1.2)
KETONES UR STRIP.AUTO-MCNC: 15 MG/DL
LEUKOCYTE ESTERASE UR QL STRIP.AUTO: NEGATIVE
LIPASE SERPL-CCNC: 32 U/L (ref 12–53)
LYMPHOCYTES # BLD AUTO: 2.14 X10(3) UL (ref 1–4)
LYMPHOCYTES NFR BLD AUTO: 27.1 %
MCH RBC QN AUTO: 29.7 PG (ref 26–34)
MCHC RBC AUTO-ENTMCNC: 34.6 G/DL (ref 31–37)
MCV RBC AUTO: 86 FL (ref 80–100)
MONOCYTES # BLD AUTO: 0.6 X10(3) UL (ref 0.1–1)
MONOCYTES NFR BLD AUTO: 7.6 %
NEUTROPHILS # BLD AUTO: 4.9 X10 (3) UL (ref 1.5–7.7)
NEUTROPHILS # BLD AUTO: 4.9 X10(3) UL (ref 1.5–7.7)
NEUTROPHILS NFR BLD AUTO: 62 %
NITRITE UR QL STRIP.AUTO: NEGATIVE
OSMOLALITY SERPL CALC.SUM OF ELEC: 291 MOSM/KG (ref 275–295)
PH UR STRIP.AUTO: 7 [PH] (ref 5–8)
PLATELET # BLD AUTO: 155 10(3)UL (ref 150–450)
PLATELETS.RETICULATED NFR BLD AUTO: 10.1 % (ref 0–7)
POTASSIUM SERPL-SCNC: 3.7 MMOL/L (ref 3.5–5.1)
PROT SERPL-MCNC: 8 G/DL (ref 5.7–8.2)
PROT UR STRIP.AUTO-MCNC: NEGATIVE MG/DL
PROTHROMBIN TIME: 12.9 SECONDS (ref 11.6–14.8)
RBC # BLD AUTO: 5.08 X10(6)UL (ref 3.8–5.8)
RBC UR QL AUTO: NEGATIVE
SODIUM SERPL-SCNC: 138 MMOL/L (ref 136–145)
SP GR UR STRIP.AUTO: 1.01 (ref 1–1.03)
TROPONIN I SERPL HS-MCNC: 6 NG/L (ref ?–53)
UROBILINOGEN UR STRIP.AUTO-MCNC: 0.2 MG/DL
WBC # BLD AUTO: 7.9 X10(3) UL (ref 4–11)

## 2025-05-26 PROCEDURE — 71045 X-RAY EXAM CHEST 1 VIEW: CPT | Performed by: EMERGENCY MEDICINE

## 2025-05-26 PROCEDURE — 99223 1ST HOSP IP/OBS HIGH 75: CPT | Performed by: STUDENT IN AN ORGANIZED HEALTH CARE EDUCATION/TRAINING PROGRAM

## 2025-05-26 PROCEDURE — 74177 CT ABD & PELVIS W/CONTRAST: CPT | Performed by: EMERGENCY MEDICINE

## 2025-05-26 RX ORDER — ONDANSETRON 2 MG/ML
4 INJECTION INTRAMUSCULAR; INTRAVENOUS ONCE
Status: COMPLETED | OUTPATIENT
Start: 2025-05-26 | End: 2025-05-26

## 2025-05-26 RX ORDER — METOCLOPRAMIDE HYDROCHLORIDE 5 MG/ML
10 INJECTION INTRAMUSCULAR; INTRAVENOUS EVERY 8 HOURS PRN
Status: DISCONTINUED | OUTPATIENT
Start: 2025-05-26 | End: 2025-05-29

## 2025-05-26 RX ORDER — ACETAMINOPHEN 500 MG
500 TABLET ORAL EVERY 4 HOURS PRN
Status: DISCONTINUED | OUTPATIENT
Start: 2025-05-26 | End: 2025-05-29

## 2025-05-26 RX ORDER — ENOXAPARIN SODIUM 100 MG/ML
40 INJECTION SUBCUTANEOUS DAILY
Status: DISCONTINUED | OUTPATIENT
Start: 2025-05-27 | End: 2025-05-29

## 2025-05-26 RX ORDER — ECHINACEA PURPUREA EXTRACT 125 MG
1 TABLET ORAL
Status: DISCONTINUED | OUTPATIENT
Start: 2025-05-26 | End: 2025-05-29

## 2025-05-26 RX ORDER — BENZONATATE 100 MG/1
200 CAPSULE ORAL 3 TIMES DAILY PRN
Status: DISCONTINUED | OUTPATIENT
Start: 2025-05-26 | End: 2025-05-29

## 2025-05-26 RX ORDER — MORPHINE SULFATE 4 MG/ML
4 INJECTION, SOLUTION INTRAMUSCULAR; INTRAVENOUS ONCE
Status: COMPLETED | OUTPATIENT
Start: 2025-05-26 | End: 2025-05-26

## 2025-05-26 RX ORDER — OXYCODONE HYDROCHLORIDE 5 MG/1
5 TABLET ORAL EVERY 4 HOURS PRN
Refills: 0 | Status: DISCONTINUED | OUTPATIENT
Start: 2025-05-26 | End: 2025-05-28

## 2025-05-26 RX ORDER — HYDRALAZINE HYDROCHLORIDE 20 MG/ML
10 INJECTION INTRAMUSCULAR; INTRAVENOUS EVERY 6 HOURS PRN
Status: DISCONTINUED | OUTPATIENT
Start: 2025-05-26 | End: 2025-05-29

## 2025-05-26 RX ORDER — ONDANSETRON 2 MG/ML
4 INJECTION INTRAMUSCULAR; INTRAVENOUS EVERY 6 HOURS PRN
Status: DISCONTINUED | OUTPATIENT
Start: 2025-05-26 | End: 2025-05-29

## 2025-05-26 RX ORDER — HYDROMORPHONE HYDROCHLORIDE 1 MG/ML
0.5 INJECTION, SOLUTION INTRAMUSCULAR; INTRAVENOUS; SUBCUTANEOUS EVERY 30 MIN PRN
Refills: 0 | Status: COMPLETED | OUTPATIENT
Start: 2025-05-26 | End: 2025-05-26

## 2025-05-26 RX ORDER — OXYCODONE HYDROCHLORIDE 10 MG/1
10 TABLET ORAL EVERY 4 HOURS PRN
Refills: 0 | Status: DISCONTINUED | OUTPATIENT
Start: 2025-05-26 | End: 2025-05-28

## 2025-05-26 RX ORDER — OXYCODONE HYDROCHLORIDE 15 MG/1
15 TABLET ORAL EVERY 4 HOURS PRN
Refills: 0 | Status: DISCONTINUED | OUTPATIENT
Start: 2025-05-26 | End: 2025-05-27

## 2025-05-26 NOTE — ED INITIAL ASSESSMENT (HPI)
Chest pain goes across chest down abdomen. Started 45 mins PTA. + nausea denies SOB. Hx ablation 25 years ago

## 2025-05-27 ENCOUNTER — ANESTHESIA EVENT (OUTPATIENT)
Dept: ENDOSCOPY | Facility: HOSPITAL | Age: 70
End: 2025-05-27
Payer: COMMERCIAL

## 2025-05-27 ENCOUNTER — ANESTHESIA (OUTPATIENT)
Dept: ENDOSCOPY | Facility: HOSPITAL | Age: 70
End: 2025-05-27
Payer: COMMERCIAL

## 2025-05-27 ENCOUNTER — APPOINTMENT (OUTPATIENT)
Dept: GENERAL RADIOLOGY | Facility: HOSPITAL | Age: 70
End: 2025-05-27
Attending: INTERNAL MEDICINE
Payer: COMMERCIAL

## 2025-05-27 PROBLEM — K80.01 CALCULUS OF GALLBLADDER WITH ACUTE CHOLECYSTITIS AND OBSTRUCTION: Status: ACTIVE | Noted: 2025-05-27

## 2025-05-27 LAB
ALBUMIN SERPL-MCNC: 5 G/DL (ref 3.2–4.8)
ALBUMIN/GLOB SERPL: 2.1 {RATIO} (ref 1–2)
ALP LIVER SERPL-CCNC: 137 U/L (ref 45–117)
ALT SERPL-CCNC: 287 U/L (ref 10–49)
ANION GAP SERPL CALC-SCNC: 11 MMOL/L (ref 0–18)
AST SERPL-CCNC: 225 U/L (ref ?–34)
ATRIAL RATE: 74 BPM
BASOPHILS # BLD AUTO: 0.03 X10(3) UL (ref 0–0.2)
BASOPHILS NFR BLD AUTO: 0.2 %
BILIRUB SERPL-MCNC: 0.8 MG/DL (ref 0.2–1.1)
BUN BLD-MCNC: 17 MG/DL (ref 9–23)
CALCIUM BLD-MCNC: 9.4 MG/DL (ref 8.7–10.6)
CHLORIDE SERPL-SCNC: 101 MMOL/L (ref 98–112)
CO2 SERPL-SCNC: 25 MMOL/L (ref 21–32)
CREAT BLD-MCNC: 1.05 MG/DL (ref 0.7–1.3)
EGFRCR SERPLBLD CKD-EPI 2021: 76 ML/MIN/1.73M2 (ref 60–?)
EOSINOPHIL # BLD AUTO: 0.01 X10(3) UL (ref 0–0.7)
EOSINOPHIL NFR BLD AUTO: 0.1 %
ERYTHROCYTE [DISTWIDTH] IN BLOOD BY AUTOMATED COUNT: 12.9 %
GLOBULIN PLAS-MCNC: 2.4 G/DL (ref 2–3.5)
GLUCOSE BLD-MCNC: 170 MG/DL (ref 70–99)
HCT VFR BLD AUTO: 42.1 % (ref 39–53)
HGB BLD-MCNC: 14.4 G/DL (ref 13–17.5)
IMM GRANULOCYTES # BLD AUTO: 0.06 X10(3) UL (ref 0–1)
IMM GRANULOCYTES NFR BLD: 0.4 %
LYMPHOCYTES # BLD AUTO: 0.57 X10(3) UL (ref 1–4)
LYMPHOCYTES NFR BLD AUTO: 3.4 %
MCH RBC QN AUTO: 29.1 PG (ref 26–34)
MCHC RBC AUTO-ENTMCNC: 34.2 G/DL (ref 31–37)
MCV RBC AUTO: 85.2 FL (ref 80–100)
MONOCYTES # BLD AUTO: 1.28 X10(3) UL (ref 0.1–1)
MONOCYTES NFR BLD AUTO: 7.5 %
NEUTROPHILS # BLD AUTO: 15.02 X10 (3) UL (ref 1.5–7.7)
NEUTROPHILS # BLD AUTO: 15.02 X10(3) UL (ref 1.5–7.7)
NEUTROPHILS NFR BLD AUTO: 88.4 %
OSMOLALITY SERPL CALC.SUM OF ELEC: 290 MOSM/KG (ref 275–295)
P AXIS: 41 DEGREES
P-R INTERVAL: 182 MS
PLATELET # BLD AUTO: 172 10(3)UL (ref 150–450)
POTASSIUM SERPL-SCNC: 4 MMOL/L (ref 3.5–5.1)
PROT SERPL-MCNC: 7.4 G/DL (ref 5.7–8.2)
Q-T INTERVAL: 404 MS
QRS DURATION: 112 MS
QTC CALCULATION (BEZET): 448 MS
R AXIS: 25 DEGREES
RBC # BLD AUTO: 4.94 X10(6)UL (ref 3.8–5.8)
SODIUM SERPL-SCNC: 137 MMOL/L (ref 136–145)
T AXIS: 20 DEGREES
VENTRICULAR RATE: 74 BPM
WBC # BLD AUTO: 17 X10(3) UL (ref 4–11)

## 2025-05-27 PROCEDURE — 0DJ08ZZ INSPECTION OF UPPER INTESTINAL TRACT, VIA NATURAL OR ARTIFICIAL OPENING ENDOSCOPIC: ICD-10-PCS | Performed by: INTERNAL MEDICINE

## 2025-05-27 PROCEDURE — 74328 X-RAY BILE DUCT ENDOSCOPY: CPT | Performed by: INTERNAL MEDICINE

## 2025-05-27 PROCEDURE — 99233 SBSQ HOSP IP/OBS HIGH 50: CPT | Performed by: HOSPITALIST

## 2025-05-27 PROCEDURE — 0FC98ZZ EXTIRPATION OF MATTER FROM COMMON BILE DUCT, VIA NATURAL OR ARTIFICIAL OPENING ENDOSCOPIC: ICD-10-PCS | Performed by: INTERNAL MEDICINE

## 2025-05-27 PROCEDURE — 99223 1ST HOSP IP/OBS HIGH 75: CPT | Performed by: SURGERY

## 2025-05-27 PROCEDURE — BF43ZZZ ULTRASONOGRAPHY OF GALLBLADDER AND BILE DUCTS: ICD-10-PCS | Performed by: INTERNAL MEDICINE

## 2025-05-27 PROCEDURE — BF13YZZ FLUOROSCOPY OF GALLBLADDER AND BILE DUCTS USING OTHER CONTRAST: ICD-10-PCS | Performed by: INTERNAL MEDICINE

## 2025-05-27 RX ORDER — HYDROMORPHONE HYDROCHLORIDE 1 MG/ML
0.4 INJECTION, SOLUTION INTRAMUSCULAR; INTRAVENOUS; SUBCUTANEOUS EVERY 2 HOUR PRN
Refills: 0 | Status: DISCONTINUED | OUTPATIENT
Start: 2025-05-27 | End: 2025-05-28

## 2025-05-27 RX ORDER — LIDOCAINE HYDROCHLORIDE 10 MG/ML
INJECTION, SOLUTION EPIDURAL; INFILTRATION; INTRACAUDAL; PERINEURAL AS NEEDED
Status: DISCONTINUED | OUTPATIENT
Start: 2025-05-27 | End: 2025-05-27 | Stop reason: SURG

## 2025-05-27 RX ORDER — HYDROMORPHONE HYDROCHLORIDE 1 MG/ML
INJECTION, SOLUTION INTRAMUSCULAR; INTRAVENOUS; SUBCUTANEOUS
Status: COMPLETED
Start: 2025-05-27 | End: 2025-05-27

## 2025-05-27 RX ORDER — INDOMETHACIN 100 MG
SUPPOSITORY, RECTAL RECTAL
Status: DISPENSED
Start: 2025-05-27 | End: 2025-05-27

## 2025-05-27 RX ORDER — NALOXONE HYDROCHLORIDE 0.4 MG/ML
80 INJECTION, SOLUTION INTRAMUSCULAR; INTRAVENOUS; SUBCUTANEOUS AS NEEDED
Status: DISCONTINUED | OUTPATIENT
Start: 2025-05-27 | End: 2025-05-27 | Stop reason: HOSPADM

## 2025-05-27 RX ORDER — AMLODIPINE BESYLATE 5 MG/1
5 TABLET ORAL DAILY
Status: DISCONTINUED | OUTPATIENT
Start: 2025-05-27 | End: 2025-05-29

## 2025-05-27 RX ORDER — HYDROMORPHONE HYDROCHLORIDE 1 MG/ML
0.1 INJECTION, SOLUTION INTRAMUSCULAR; INTRAVENOUS; SUBCUTANEOUS EVERY 2 HOUR PRN
Refills: 0 | Status: DISCONTINUED | OUTPATIENT
Start: 2025-05-27 | End: 2025-05-28

## 2025-05-27 RX ORDER — HYDROMORPHONE HYDROCHLORIDE 1 MG/ML
0.2 INJECTION, SOLUTION INTRAMUSCULAR; INTRAVENOUS; SUBCUTANEOUS EVERY 2 HOUR PRN
Refills: 0 | Status: DISCONTINUED | OUTPATIENT
Start: 2025-05-27 | End: 2025-05-28

## 2025-05-27 RX ORDER — INDOMETHACIN 50 MG/1
SUPPOSITORY RECTAL
Status: DISCONTINUED | OUTPATIENT
Start: 2025-05-27 | End: 2025-05-27 | Stop reason: HOSPADM

## 2025-05-27 RX ORDER — SODIUM CHLORIDE, SODIUM LACTATE, POTASSIUM CHLORIDE, CALCIUM CHLORIDE 600; 310; 30; 20 MG/100ML; MG/100ML; MG/100ML; MG/100ML
INJECTION, SOLUTION INTRAVENOUS CONTINUOUS
Status: DISCONTINUED | OUTPATIENT
Start: 2025-05-27 | End: 2025-05-29

## 2025-05-27 RX ORDER — SODIUM CHLORIDE, SODIUM LACTATE, POTASSIUM CHLORIDE, CALCIUM CHLORIDE 600; 310; 30; 20 MG/100ML; MG/100ML; MG/100ML; MG/100ML
INJECTION, SOLUTION INTRAVENOUS CONTINUOUS PRN
Status: DISCONTINUED | OUTPATIENT
Start: 2025-05-27 | End: 2025-05-27 | Stop reason: SURG

## 2025-05-27 RX ORDER — SODIUM CHLORIDE 9 MG/ML
INJECTION, SOLUTION INTRAVENOUS CONTINUOUS
Status: DISCONTINUED | OUTPATIENT
Start: 2025-05-27 | End: 2025-05-28

## 2025-05-27 RX ORDER — INDOCYANINE GREEN AND WATER 25 MG
5 KIT INJECTION ONCE
Status: COMPLETED | OUTPATIENT
Start: 2025-05-28 | End: 2025-05-28

## 2025-05-27 RX ADMIN — SODIUM CHLORIDE, SODIUM LACTATE, POTASSIUM CHLORIDE, CALCIUM CHLORIDE: 600; 310; 30; 20 INJECTION, SOLUTION INTRAVENOUS at 11:22:00

## 2025-05-27 RX ADMIN — SODIUM CHLORIDE, SODIUM LACTATE, POTASSIUM CHLORIDE, CALCIUM CHLORIDE: 600; 310; 30; 20 INJECTION, SOLUTION INTRAVENOUS at 11:06:00

## 2025-05-27 RX ADMIN — LIDOCAINE HYDROCHLORIDE 50 MG: 10 INJECTION, SOLUTION EPIDURAL; INFILTRATION; INTRACAUDAL; PERINEURAL at 11:06:00

## 2025-05-27 NOTE — DISCHARGE INSTRUCTIONS
Home Care Instructions  Cholecystectomy  Dr. Rocio Iglesias    MEDICATIONS  For post-operative pain control, the medications are usually oxycodone.  This is a narcotic and is best taken by starting with a half a tablet every four to six hours as needed.  If the patient does not feel they need the narcotics they shouldn’t take them.  If the pain is severe the patient may take a whole pill every six hours.  The patient can take tylenol 1g three times a day and ibuprofen 400-600mg in between up to 4 times a day as needed for pain as well.  The patient can also take miralax or colace as needed for constipation. Please ask your surgeon before resuming blood thinners such as aspirin, Plavix or Coumadin.  All other home medications may be resumed as scheduled.     DIET  The patient may resume a general diet immediately.  This is not a good time to eat excessively.  The patient should eat in moderation and stick with foods the patient feels are easy to digest.  Avoid high fat foods in the immediate post-operative time period as this may still cause some problems.  The patient may eat anything in small amounts but foods rich in dairy products, fatty foods or fried foods may cause an upset stomach for up to six weeks after surgery.  There should be no alcohol consumption in the immediate recovery time period or within six hours of taking narcotics.    WOUND CARE  The top dressing may be removed the day after surgery.  This includes the gauze, tape and band-aids if they are present.  Do not remove the steri-strips or butterfly tapes that are white and adherent to the skin.  The steri-strips will eventually peel up at the ends and at this point they may be removed.  This is usually seven to ten days after surgery.  The patient may shower the day after surgery.  There is no need to cover the incisions, and all top gauze type dressing should be removed prior to showering.  Soap can get on the wounds but do not scrub over the  wounds.  No hair dye or chemicals of any kind should get in the wounds.  Avoid tub baths, swimming or sitting in a hot tub for two weeks.  If visible sutures or staples are present they will be removed in the office by the surgeon or nurse.  Most wounds will be closed with dissolving suture underneath the skin.  These sutures will dissolve on their own.  If a drain is present make sure the patient receives drain care instructions from the nurse prior to discharge.  Most patients will not have a drain.    ACTIVITY  Every day the patient should be up, showered and dressed.  Each day the patient should be up and around the house.  The patient should not lie in bed and should not stay in pajamas.  We count on the patient being up, coughing, walking and deep breathing to avoid pneumonia and blood clots in the legs.  Once a day the patient should get out of the house and go shopping, go to the mall, the [employer] store, the movies or a restaurant.  The patient may ride in a car but should not drive the car for at least one week.  Patients should be off narcotics for at least 8 hours prior to being a .  The average time off work is 10 to 14 days; most adults will be seeing the surgeon prior to returning to work.  Students will return to school within 1-5 days after discharge from the hospital but will be off gym, sports, and indoors for recess for one month.  Patients may go up and down stairs and lift up to five pounds but no bending, pushing or pulling.  Nothing called work or exercise until the follow up visit.  No ‘stair-master’, power walking, jogging or workouts until the follow up visit.  Patients should seek further activity limits at the time of their appointment.    APPOINTMENT  Please call our office for an appointment within five to ten days of discharge.  Any fever greater than 100.5, chills, nausea, vomiting, or severe diarrhea please call our office.  If the wound turns red, hot, swollen, becomes  increasingly painful, or drains pus call us immediately at [phone].  For life threatening emergencies call 911.  For non-emergent care please call our office after 8:30 a.m. Monday through Friday.  The number listed above is our office number; our phone automatically switches to our answering service if we are not there.  Please do not use the emergency room for non-urgent care.    Thank you for entrusting us with your care.  [facility]--General Surgery

## 2025-05-27 NOTE — ED PROVIDER NOTES
Patient Seen in: [facility] In [place]        History  Chief Complaint   Patient presents with    Chest Pain Angina     Stated Complaint: chest pain    Subjective:   69-year-old male, history of hypertension, kidney stones, presents with left upper quadrant epigastric pain started after eating a couple hours ago.  Intermittently nauseous.  Bili has been radiate up into the left side of his chest.  Denies any history of CAD, non-smoker.  No fall or trauma.  No vomiting.  No diarrhea constipation.                      Objective:     Past Medical History:    Calculus of kidney    Essential hypertension    Essential hypertension    High blood pressure    History of COVID-19    Pt cant recall exact month. Had cold like S/S for a couple of days.    Osteoarthritis    Paroxysmal supraventricular tachycardia (HCC)    PONV (postoperative nausea and vomiting)    Visual impairment    glasses              Past Surgical History:   Procedure Laterality Date    Colonoscopy      Hip replacement surgery Right 09/12/2023    Neck/chest procedure unlisted  1988    right neck lymph    Other Right 2018    Right knee repair, not replacement.     Repair of nasal septum  1998                Social History     Socioeconomic History    Marital status:    Tobacco Use    Smoking status: Never    Smokeless tobacco: Never   Vaping Use    Vaping status: Never Used   Substance and Sexual Activity    Alcohol use: Yes     Alcohol/week: 0.0 standard drinks of alcohol     Comment: 1 drink a week    Drug use: No   Other Topics Concern    Caffeine Concern Yes     Comment: 1 cup of coffee daily    Exercise Yes     Comment: 4-5 days a week    Seat Belt Yes                                Physical Exam    ED Triage Vitals   BP 05/26/25 1830 (!) 209/92   Pulse 05/26/25 1829 75   Resp 05/26/25 1829 23   Temp 05/26/25 1830 98.8 °F (37.1 °C)   Temp src 05/26/25 1830 Temporal   SpO2 05/26/25 1830 100 %   O2 Device 05/26/25 1830 None  (Room air)       Current Vitals:   Vital Signs  BP: (!) 185/88  Pulse: 79  Resp: 18  Temp: 98.8 °F (37.1 °C)  Temp src: Temporal    Oxygen Therapy  SpO2: 97 %  O2 Device: None (Room air)  O2 Flow Rate (L/min): 3 L/min            Physical Exam  Vitals and nursing note reviewed.   Constitutional:       General: He is in acute distress.   Cardiovascular:      Rate and Rhythm: Normal rate.      Heart sounds: Normal heart sounds.   Pulmonary:      Effort: Pulmonary effort is normal.      Breath sounds: Normal breath sounds.   Chest:      Chest wall: No tenderness.   Abdominal:      Palpations: Abdomen is soft.      Tenderness: There is abdominal tenderness. There is rebound.   Musculoskeletal:      Cervical back: Normal range of motion and neck supple.   Skin:     General: Skin is warm and dry.   Neurological:      General: No focal deficit present.      Mental Status: He is alert.   Psychiatric:         Mood and Affect: Mood is anxious.       Tenderness to palpation left upper quadrant in the epigastrium.  Some rebound tenderness.  No chest wall tenderness.  Also has some mild pain in the left lower quadrant.  Lungs are clear, pulses are equal.        ED Course  Labs Reviewed   COMP METABOLIC PANEL (14) - Abnormal; Notable for the following components:       Result Value    Glucose 118 (*)     BUN 25 (*)     AST 39 (*)     ALT 80 (*)     Albumin 5.6 (*)     A/G Ratio 2.3 (*)     All other components within normal limits   CBC WITH DIFFERENTIAL WITH PLATELET - Abnormal; Notable for the following components:    Immature Platelet Fraction 10.1 (*)     All other components within normal limits   URINALYSIS WITH CULTURE REFLEX - Abnormal; Notable for the following components:    Ketones Urine 15 (*)     All other components within normal limits   TROPONIN I HIGH SENSITIVITY - Normal   LIPASE - Normal   PROTHROMBIN TIME (PT) - Normal   RAINBOW DRAW LAVENDER   RAINBOW DRAW LIGHT GREEN   RAINBOW DRAW BLUE     EKG    Rate,  intervals and axes as noted on EKG Report.  Rate: 74  Rhythm: Sinus Rhythm  Reading: EKG sinus rhythm 74 bpm.  Incomplete membranous block.  No ST elevations.  Compared to August 2023, no significant changes are noted.  , ,  ms    Patient placed on cardiac monitor for telemetry monitoring secondary to =abd pain. Interpretation at bedside by me is sinus rhythm.                                  MDM     CT ABDOMEN+PELVIS(CONTRAST ONLY)(CPT=74177)  Result Date: 5/26/2025  CONCLUSION:   1. Mild intra and extrahepatic biliary dilatation.  There is an approximately 6 mm calculus of the distal common bile duct, likely the source of obstruction.  Recommend correlation with LFTs.  2. Concentric bladder wall thickening, likely accentuated by under distention, though cystitis may be contributory.  Recommend correlation with urinalysis.   LOCATION:  [facility]   Dictated by (CST): Aurelio Lyons MD on 5/26/2025 at 8:49 PM     Finalized by (CST): Aurelio Lyons MD on 5/26/2025 at 8:56 PM       XR CHEST AP PORTABLE  (CPT=71045)  Result Date: 5/26/2025  CONCLUSION:  Heart size upper limits normal.  No pulmonary edema or focal airspace consolidation.  No significant pleural effusion or appreciable pneumothorax.  Osteoarthritis of the shoulders and spine.   LOCATION:  [facility]      Dictated by (CST): Aurelio Lyons MD on 5/26/2025 at 7:49 PM     Finalized by (CST): Aurelio Lyons MD on 5/26/2025 at 7:49 PM         External chart review demonstrates outpatient family medicine visit last fall    Wife at bedside helpful to provide information on history presenting illness    I independently interpreted the CT of the abdomen pelvis noted a distended gallbladder    Differential diagnosis includes, but not limited to, ACS, dissection, gastritis, GERD, pancreatitis, cholelithiasis, cholecystitis, choledocholithiasis, kidney stone    69-year-old male presents with upper abdominal pain, sudden onset prior to arrival.  Receiving multiple  rounds of Dilaudid.  LFTs mildly elevated.  CT with choledocholithiasis.  Discuss[facility] with Dr. Hernandez from [facility], no need for MRCP at this time.  Will see in the morning.  Likely ERCP.  Would like Zosyn and surgical consultation.  Dr. Avendaño aware, will see.  Admitted to Dr. Mueller, awaiting bed assignment.  NPO.  Discussed with patient and wife.  In agreement      Admission disposition: 5/26/2025  9:19 PM           Medical Decision Making      Disposition and Plan     Clinical Impression:  1. Choledocholithiasis         Disposition:  Admit  5/26/2025  9:19 pm    Follow-up:  No follow-up provider specified.        Medications Prescribed:  Current Discharge Medication List                Supplementary Documentation:         Hospital Problems       Present on Admission  Date Reviewed: 8/28/2024          ICD-10-CM Noted POA    * (Principal) Choledocholithiasis K80.50 5/26/2025 Unknown

## 2025-05-27 NOTE — PROGRESS NOTES
[facility]   part of [facility]     Hospitalist Progress Note     Femi Paul Patient Status:  Inpatient    1955 MRN [id number]   Location [facility] 3NW-A Attending Gurinder Mueller MD   Hosp Day # 1 PCP Martin Anton MD     Chief Complaint: abdominal pain    Subjective:     Patient back from ERCP, pain improved.    Objective:    Review of Systems:   A comprehensive review of systems was completed; pertinent positive and negatives stated in subjective.    Vital signs:  Temp:  [97.6 °F (36.4 °C)-98.8 °F (37.1 °C)] 98.2 °F (36.8 °C)  Pulse:  [74-90] 79  Resp:  [14-23] 20  BP: (138-209)/(63-92) 138/63  SpO2:  [86 %-100 %] 90 %    Physical Exam:    General: No acute distress  Respiratory: No wheezes, no rhonchi  Cardiovascular: S1, S2, regular rate and rhythm  Abdomen: Soft, Non-tender, non-distended, positive bowel sounds  Neuro: No new focal deficits.   Extremities: No edema      Diagnostic Data:    Labs:  Recent Labs   Lab 25  0448   WBC 7.9 17.0*   HGB 15.1 14.4   MCV 86.0 85.2   .0 172.0   INR 0.99  --        Recent Labs   Lab 25  0448   * 170*   BUN 25* 17   CREATSERUM 1.14 1.05   CA 10.3 9.4   ALB 5.6* 5.0*    137   K 3.7 4.0    101   CO2 26.0 25.0   ALKPHO 117 137*   AST 39* 225*   ALT 80* 287*   BILT 0.4 0.8   TP 8.0 7.4       Estimated Glomerular Filtration Rate: 76 mL/min/1.73m2 (result from lab).    Recent Labs   Lab 25   TROPHS 6       Recent Labs   Lab 25   PTP 12.9   INR 0.99                  Microbiology    No results found for this visit on 25.      Imaging: Reviewed in Epic.    Medications: Scheduled Medications[1]    Assessment & Plan:      #Choledocholithiasis  #elevated aminotransferases  -AST 39, ALT 80  -CT showing mild intra and extrahepatic biliary dilation, 6mm calculus in distal CBD, concentric bladder wall thickening  -met 1 SIRS criteria: RR 23, no signs of  cholangitis at present  -pain control: tylenol, oxycodone  -cont to trend LFT's, higher today  -s/p ERCP with biliary sphincterotomy and balloon stone extraction  -cholecystectomy per surgery, tomorrow  -cont IVF, pain control, anti-emetics     #acute hypoxic respiratory failure  -SpO2 as low as 86% on room air  -chest xray without acute process  -suspect 2/2 narcotic administration and splinting from pain  -wean O2 as able with goal >94%  -incentive spirometry  -limit narcotics as able  -resolved, sat low 90s on RA     #HTN urgency  -BP as high as 209/92  -suspect in part 2/2 pain  -goal 25% reduction in BP over 24 hrs  -hydralazine prn  -monitor on telemetry  -cont home amlodipine  -BP improved    #Dispo, repeat CMP, CBC in am, await surgery tomorrow            Niraj Carias MD    Supplementary Documentation:     Quality:  DVT Mechanical Prophylaxis:   SCDs,    DVT Pharmacologic Prophylaxis   Medication    enoxaparin (Lovenox) 40 MG/0.4ML SUBQ injection 40 mg                Code Status: Not on file  Lua: No urinary catheter in place  Lua Duration (in days):   Central line:    BENOIT:     Discharge is dependent on: progress  At this point Mr. Paul is expected to be discharge to: home    The 21st Century Cures Act makes medical notes like these available to patients in the interest of transparency. Please be advised this is a medical document. Medical documents are intended to carry relevant information, facts as evident, and the clinical opinion of the practitioner. The medical note is intended as peer to peer communication and may appear blunt or direct. It is written in medical language and may contain abbreviations or verbiage that are unfamiliar.                       [1]    amLODIPine  5 mg Oral Daily    [START ON 5/28/2025] indocyanine green  5 mg Intravenous Once    indomethacin        enoxaparin  40 mg Subcutaneous Daily

## 2025-05-27 NOTE — CONSULTS
[facility]                       Gastroenterology Consultation-[facility]    Femi Paul Patient Status:  Inpatient    1955 MRN [id number]   Location [facility] 3NW-A Attending Gurinder Mueller MD   Hosp Day # 1 PCP Martin Anton MD     Reason for consultation: Choledocholithiasis   HPI: This is a 69 yr-old male with PMhx that includes HTN, dyslipidemia, SVT, and kidney stones who presented to ER yesterday with acute abd pain. Pt reports being in his usual state of health until yesterday afternoon when he developed severe epigastric pain which then radiated to his chest, shoulder, and then LUQ. Symptoms progressed to nausea with bloating and constipation. Looking back, he reports similar less severe symptoms ~6 weeks ago which self-resolved with PPI, Gaviscon, and acetaminophen with no other episodes until yesterday afternoon while at a BBQ. No recent change in wt or appetite. No chronic NSAIDs, anticoagulants, or antiplatelet agents. No fevers or chills.  CT a/p IV suggests cholelithiasis with gallbladder distention, intra/extrahepatic bile duct dilation with CBD 9 mm and a suspected intraductal calculus in the distal CBD measuring 6 mm; labs elevated LFTs (  Alk Phos 137 Bili 0.8), normal lipase (32), leukocytosis (WBC 17)  Pt with ongoing upper abd pain and has remained hypertensive since arrival.   PMHx: Past Medical History[1]             PSHx: Past Surgical History[2]  Medications: Current Medications[3]  Allergies: Allergies[4]  Social HX: Short Social Hx on File[5]   FamHx: The patient has no family history of colon cancer or other gastrointestinal malignancies;  No family history of ulcer disease, or inflammatory bowel disease  ROS:  In addition to the pertinent positives described above:            Infectious Disease:  No chronic infections or recent fevers prior to the acute illness            Cardiovascular: + HTN, dyslipidemia, SVT             Respiratory: No shortness of breath, asthma, copd, recurrent pneumonia            Hematologic: The patient reports no easy bruising, frequent gum bleeding or nose bleeding;  The patient has no history of known chronic anemia            Dermatologic: The patient reports no recent rashes or chronic skin disorders            Rheumatologic: The patient reports no history of chronic arthritis, myalgias, arthralgias            Genitourinary:  + nephrolithiasis           Psychiatric: The patient reports no history of depression, anxiety, suicidal ideation, or homicidal ideation           Oncologic: The patient reports no history of prior solid tumor or hematologic malignancy           ENT: The patient reports no hoarseness of voice, hearing loss, sinus congestion, tinnitus           Neurologic: The patient reports no history of seizure, stroke, or frequent headaches  PE: BP (!) 175/79 (BP Location: Left arm)   Pulse 90   Temp 98.4 °F (36.9 °C) (Oral)   Resp 14   Ht 6' (1.829 m)   Wt 205 lb (93 kg)   SpO2 94%   BMI 27.80 kg/m²   Gen: AAO x 3, able to speak in complete sentences  HENT: EOMI, PERRL, oropharynx is clear with moist mucosal membranes  Eyes: Sclerae are anicteric  Neck:  Supple without nuchal rigidity  CV: Regular rate and rhythm, with normal S1 and S2  Resp: Clear to auscultation bilaterally without wheezes; rubs, rhonchi, or rales  Abdomen: Soft, diffuse upper abd tenderness marcos in RUQ/epigastrium, non-distended with the presence of bowel sounds; No hepatosplenomegaly; no rebound or guarding; No ascites is clinically apparent; no tympany to percussion  Ext: No peripheral edema or cyanosis  Skin: Warm and dry  Psychiatric: Appropriate mood and congruent affect without obvious depression or anxiety  Labs:   Lab Results   Component Value Date    WBC 17.0 05/27/2025    HGB 14.4 05/27/2025    HCT 42.1 05/27/2025    .0 05/27/2025    CREATSERUM 1.05 05/27/2025    BUN 17 05/27/2025     05/27/2025     K 4.0 05/27/2025     05/27/2025    CO2 25.0 05/27/2025     05/27/2025    CA 9.4 05/27/2025    ALB 5.0 05/27/2025    ALKPHO 137 05/27/2025    BILT 0.8 05/27/2025     05/27/2025     05/27/2025    INR 0.99 05/26/2025    PTP 12.9 05/26/2025    LIP 32 05/26/2025     Recent Labs   Lab 05/26/25  1840 05/27/25  0448   * 170*   BUN 25* 17   CREATSERUM 1.14 1.05   CA 10.3 9.4    137   K 3.7 4.0    101   CO2 26.0 25.0     Recent Labs   Lab 05/27/25  0448   RBC 4.94   HGB 14.4   HCT 42.1   MCV 85.2   MCH 29.1   MCHC 34.2   RDW 12.9   NEPRELIM 15.02*   WBC 17.0*   .0       Recent Labs   Lab 05/26/25  1840 05/27/25  0448   ALT 80* 287*   AST 39* 225*       Imaging:   PROCEDURE:  CT ABDOMEN+PELVIS (CONTRAST ONLY) (CPT=74177)     COMPARISON:  None.     INDICATIONS:  LUQ, epigastric pain     TECHNIQUE:  CT scanning was performed from the dome of the diaphragm to the pubic symphysis with non-ionic intravenous contrast material. Post contrast coronal MPR imaging was performed.  Dose reduction techniques were used. Dose information is  transmitted to the ACR (American College of Radiology) NRDR (National Radiology Data Registry) which includes the Dose Index Registry.     PATIENT STATED HISTORY:(As transcribed by Technologist)  The patient states he had a sudden onset of epigastric pain x this PM.      CONTRAST USED:  100cc of Isovue 370     FINDINGS:    LIVER:  No enlargement, atrophy, abnormal density, or significant focal lesion.    BILIARY:  Cholelithiasis and gallbladder distention.  The intra and extrahepatic bile ducts are dilated, the common bile duct measuring up to 9 mm.  Suspected intraductal calculus of the distal common bile duct measures 6 mm (series 601, image 66),  likely the source of biliary obstruction.    PANCREAS:  Moderate, diffuse parenchymal atrophy.  SPLEEN:  No enlargement or focal lesion.    KIDNEYS:  No mass, obstruction, or calcification.  Left mid polar  cortical cyst measures 2.1 cm.  ADRENALS:  No mass or enlargement.    AORTA/VASCULAR:  No aneurysm or dissection.    RETROPERITONEUM:  No mass or adenopathy.    BOWEL/MESENTERY:  The stomach is moderately distended with ingested material.  No evidence of bowel inflammation or obstruction.  Normal appendix.  Sigmoid diverticulosis.  ABDOMINAL WALL:  No mass or hernia.    URINARY BLADDER:  Bladder wall is accentuated by under distention, though cystitis may be contributory.  PELVIC NODES:  No adenopathy.    PELVIC ORGANS:  Prostatomegaly.  BONES:  Osteoarthritis of the hips and spine with right total hip prosthesis noted.  LUNG BASES:  No visible pulmonary or pleural disease.    OTHER:  Negative.       Impression   CONCLUSION:       1. Mild intra and extrahepatic biliary dilatation.  There is an approximately 6 mm calculus of the distal common bile duct, likely the source of obstruction.  Recommend correlation with LFTs.     2. Concentric bladder wall thickening, likely accentuated by under distention, though cystitis may be contributory.  Recommend correlation with urinalysis.        LOCATION:  [facility]        Dictated by (CST): Aurelio Lyons MD on 5/26/2025 at 8:49 PM      Finalized by (CST): Aurelio Lyons MD on 5/26/2025 at 8:56 PM       Impression: 69 yr-old male with hx of HTN, dyslipidemia, SVT and kidney stones admitted yesterday with acute upper abd pain. CT a/p IV suggests cholelithiasis with gallbladder distention, intra/extrahepatic bile duct dilation with CBD 9 mm and a suspected intraductal calculus in the distal CBD measuring 6 mm; labs elevated LFTs (  Alk Phos 137 Bili 0.8), normal lipase (32), leukocytosis (WBC 17)  Will plan for EUS/ERCP under MAC for choledocholithiasis. The risks, benefits, alternatives of the procedure including the risks of anesthesia, bleeding, perforation, missed lesions, need for surgery, infection, and acute pancreatitis were discussed with the patient and spouse at  bedside. Both expressed understanding of the risks and are agreeable to proceed.    Recommendations:     EUS/ERCP under MAC with Dr Barreto later today  NPO until above is completed  Please hold AM dose of Lovenox   Pain management per Hospitalist recommendations; antiemetics as needed   Appreciate general surgery recommendations regarding timing of cholecystectomy  CBC, CMP in AM correcting electrolytes as needed per Hospitalist recommendations     Thank you for the consultation, we will follow the patient with you.  Attending addendum (Dr Barreto) to follow later today and provide formal, final recommendations at that time   PEBBLES Price  9:36 AM  5/27/2025  [facility]  [phone]    Attending physician addendum:   I have personally performed a face to face diagnostic evaluation on this patient.  I have reviewed and agree with the care plan.  History and exam by me shows:Abnormal LFT's, imaging suggest possible choledocholithiasis, biliary dilatation abdominal pain  Plan for EUS and possible ERCP today  Laz Barreto MD MD  [facility]  5/27/2025  2:15 PM       [1]   Past Medical History:   Calculus of kidney    Essential hypertension    Essential hypertension    High blood pressure    History of COVID-19    Pt cant recall exact month. Had cold like S/S for a couple of days.    Osteoarthritis    Paroxysmal supraventricular tachycardia (HCC)    PONV (postoperative nausea and vomiting)    Visual impairment    glasses   [2]   Past Surgical History:  Procedure Laterality Date    Colonoscopy      Hip replacement surgery Right 09/12/2023    Neck/chest procedure unlisted  1988    right neck lymph    Other Right 2018    Right knee repair, not replacement.     Repair of nasal septum  1998   [3]    sodium chloride 0.9% infusion   Intravenous Continuous    amLODIPine (Norvasc) tab 5 mg  5 mg Oral Daily    HYDROmorphone (Dilaudid) 1 MG/ML injection 0.1 mg  0.1 mg Intravenous Q2H  PRN    Or    HYDROmorphone (Dilaudid) 1 MG/ML injection 0.2 mg  0.2 mg Intravenous Q2H PRN    Or    HYDROmorphone (Dilaudid) 1 MG/ML injection 0.4 mg  0.4 mg Intravenous Q2H PRN    [START ON 5/28/2025] indocyanine green (IC-Green) injection 5 mg  5 mg Intravenous Once    [COMPLETED] sodium chloride 0.9 % IV bolus 1,000 mL  1,000 mL Intravenous Once    [COMPLETED] ondansetron (Zofran) 4 MG/2ML injection 4 mg  4 mg Intravenous Once    [COMPLETED] morphINE PF 4 MG/ML injection 4 mg  4 mg Intravenous Once    [COMPLETED] HYDROmorphone (Dilaudid) 1 MG/ML injection 0.5 mg  0.5 mg Intravenous Q30 Min PRN    [COMPLETED] iopamidol 76% (ISOVUE-370) injection for power injector  100 mL Intravenous ONCE PRN    [COMPLETED] piperacillin-tazobactam (Zosyn) 4.5 g in dextrose 5% 100 mL IVPB-ADDV  4.5 g Intravenous Once    enoxaparin (Lovenox) 40 MG/0.4ML SUBQ injection 40 mg  40 mg Subcutaneous Daily    acetaminophen (Tylenol Extra Strength) tab 500 mg  500 mg Oral Q4H PRN    melatonin tab 3 mg  3 mg Oral Nightly PRN    ondansetron (Zofran) 4 MG/2ML injection 4 mg  4 mg Intravenous Q6H PRN    metoclopramide (Reglan) 5 mg/mL injection 10 mg  10 mg Intravenous Q8H PRN    benzonatate (Tessalon) cap 200 mg  200 mg Oral TID PRN    glycerin-hypromellose- (Artificial Tears) 0.2-0.2-1 % ophthalmic solution 1 drop  1 drop Both Eyes QID PRN    sodium chloride (Saline Mist) 0.65 % nasal solution 1 spray  1 spray Each Nare Q3H PRN    hydrALAzine (Apresoline) 20 mg/mL injection 10 mg  10 mg Intravenous Q6H PRN    oxyCODONE immediate release tab 5 mg  5 mg Oral Q4H PRN    Or    oxyCODONE immediate release tab 10 mg  10 mg Oral Q4H PRN   [4] No Known Allergies  [5]   Social History  Socioeconomic History    Marital status:    Tobacco Use    Smoking status: Never    Smokeless tobacco: Never   Vaping Use    Vaping status: Never Used   Substance and Sexual Activity    Alcohol use: Yes     Alcohol/week: 0.0 standard drinks of alcohol      Comment: 1 drink a week    Drug use: No   Other Topics Concern    Caffeine Concern Yes     Comment: 1 cup of coffee daily    Exercise Yes     Comment: 4-5 days a week    Seat Belt Yes     Social Drivers of Health     Food Insecurity: No Food Insecurity (5/26/2025)    NCSS - Food Insecurity     Worried About Running Out of Food in the Last Year: No     Ran Out of Food in the Last Year: No   Transportation Needs: No Transportation Needs (5/26/2025)    NCSS - Transportation     Lack of Transportation: No   Housing Stability: Not At Risk (5/26/2025)    NCSS - Housing/Utilities     Has Housing: Yes     Worried About Losing Housing: No     Unable to Get Utilities: No

## 2025-05-27 NOTE — ANESTHESIA POSTPROCEDURE EVALUATION
[facility]    Femi Paul Patient Status:  Inpatient   Age/Gender 69 year old male MRN [id number]   Location [facility] Attending Gurinder Mueller MD   Hosp Day # 1 PCP Martin Anton MD       Anesthesia Post-op Note    ENDOSCOPIC ULTRASOUND (EUS) WITH ENDOSCOPIC RETROGRADE CHOLANGIOPANCREATOGRAPHY with sphincterotomy, balloon sweep, gold probe cautery    Procedure Summary       Date: 05/27/25 Room / Location:  ENDOSCOPY 04 / [facility]    Anesthesia Start: 1100 Anesthesia Stop:     Procedures:       ENDOSCOPIC ULTRASOUND (EUS) WITH ENDOSCOPIC RETROGRADE CHOLANGIOPANCREATOGRAPHY with sphincterotomy, balloon sweep, gold probe cautery      ENDOSCOPIC RETROGRADE CHOLANGIOPANCREATOGRAPHY (ERCP) Diagnosis: (Choledocholithiasis)    Surgeons: Laz Barreto MD Anesthesiologist: Michael Paez MD    Anesthesia Type: MAC ASA Status: 2            Anesthesia Type: MAC    Vitals Value Taken Time   /73 05/27/25 11:52   Temp   05/27/25 11:53   Pulse 87 05/27/25 11:53   Resp 22 05/27/25 11:47   SpO2 95 % 05/27/25 11:53   Vitals shown include unfiled device data.        Patient Location: Endoscopy    Anesthesia Type: MAC    Airway Patency: patent    Postop Pain Control: adequate    Mental Status: preanesthetic baseline    Nausea/Vomiting: none    Cardiopulmonary/Hydration status: stable euvolemic    Complications: no apparent anesthesia related complications    Postop vital signs: stable    Dental Exam: Unchanged from Preop    Patient to be discharged from PACU when criteria met.

## 2025-05-27 NOTE — ANESTHESIA PREPROCEDURE EVALUATION
PRE-OP EVALUATION    Patient Name: Femi Paul    Admit Diagnosis: Choledocholithiasis [K80.50]    Pre-op Diagnosis: Choledocholithiasis    ENDOSCOPIC ULTRASOUND (EUS) WITH ENDOSCOPIC RETROGRADE CHOLANGIOPANCREATOGRAPHY    Anesthesia Procedure: ENDOSCOPIC ULTRASOUND (EUS) WITH ENDOSCOPIC RETROGRADE CHOLANGIOPANCREATOGRAPHY  ENDOSCOPIC RETROGRADE CHOLANGIOPANCREATOGRAPHY (ERCP)    Surgeons and Role:     * Laz Barreto MD - Primary    Pre-op vitals reviewed.  Temp: 98.4 °F (36.9 °C)  Pulse: 87  Resp: 14  BP: 160/74  SpO2: 94 %  Body mass index is 27.8 kg/m².    Current medications reviewed.  Hospital Medications:  Current Medications[1]    Outpatient Medications:   Prescriptions Prior to Admission[2]    Allergies: Patient has no known allergies.      Anesthesia Evaluation    Patient summary reviewed.    Anesthetic Complications  (-) history of anesthetic complications         GI/Hepatic/Renal      (-) GERD                           Cardiovascular        Exercise tolerance: good     MET: >4    (-) obesity  (+) hypertension     (-) CAD                  (-) angina     (-) JOYCE         Endo/Other      (-) diabetes                            Pulmonary      (-) asthma  (-) COPD       (-) shortness of breath     (+) sleep apnea       Neuro/Psych                 (+) neuromuscular disease             Patient Active Problem List:     Vitamin D deficiency     Brachial neuritis or radiculitis NOS     Calculus of kidney     Herpes simplex virus (HSV) infection     SVT (supraventricular tachycardia) (HCC)     Essential hypertension     Diverticulosis     Internal hemorrhoids     Choledocholithiasis            Past Surgical History[3]  Social Hx on file[4]  History   Drug Use No     Available pre-op labs reviewed.  Lab Results   Component Value Date    WBC 17.0 (H) 05/27/2025    RBC 4.94 05/27/2025    HGB 14.4 05/27/2025    HCT 42.1 05/27/2025    MCV 85.2 05/27/2025    MCH 29.1 05/27/2025    MCHC 34.2 05/27/2025    RDW  12.9 05/27/2025    .0 05/27/2025     Lab Results   Component Value Date     05/27/2025    K 4.0 05/27/2025     05/27/2025    CO2 25.0 05/27/2025    BUN 17 05/27/2025    CREATSERUM 1.05 05/27/2025     (H) 05/27/2025    CA 9.4 05/27/2025     Lab Results   Component Value Date    INR 0.99 05/26/2025         Airway      Mallampati: III  Mouth opening: >3 FB  TM distance: 4 - 6 cm  Neck ROM: full Cardiovascular    Cardiovascular exam normal.  Rhythm: regular  Rate: normal     Dental  Comment: Patient denies any loose/missing/cracked teeth. No gross abnormalities or loose teeth noted on exam.      Dentition appears grossly intact         Pulmonary    Pulmonary exam normal.                 Other findings              ASA: 2   Plan: MAC  NPO status verified and patient meets guidelines.    Post-procedure pain management plan discussed with surgeon and patient.    Comment:     A detailed discussion about the anesthetic plan was held with Femi Paul in the preoperative area. Benefits and risks of MAC anesthesia were discussed, including intraoperative awareness/recall, PONV, reasonable expectations of post-operative pain/discomfort, aspiration, conversion to general anesthesia, dental injury, pressure/nerve injuries from positioning, and other serious but rare complications (life-threatening cardiopulmonary events). All questions were answered appropriately and patient demonstrated understanding of realistic expectations and risks of undergoing anesthesia. Femi Paul consents to receiving anesthesia and wishes to proceed.  Plan/risks discussed with: patient                Present on Admission:  **None**             [1]    sodium chloride 0.9% infusion   Intravenous Continuous    amLODIPine (Norvasc) tab 5 mg  5 mg Oral Daily    HYDROmorphone (Dilaudid) 1 MG/ML injection 0.1 mg  0.1 mg Intravenous Q2H PRN    Or    HYDROmorphone (Dilaudid) 1 MG/ML injection 0.2 mg  0.2 mg  Intravenous Q2H PRN    Or    HYDROmorphone (Dilaudid) 1 MG/ML injection 0.4 mg  0.4 mg Intravenous Q2H PRN    [START ON 5/28/2025] indocyanine green (IC-Green) injection 5 mg  5 mg Intravenous Once    [COMPLETED] sodium chloride 0.9 % IV bolus 1,000 mL  1,000 mL Intravenous Once    [COMPLETED] ondansetron (Zofran) 4 MG/2ML injection 4 mg  4 mg Intravenous Once    [COMPLETED] morphINE PF 4 MG/ML injection 4 mg  4 mg Intravenous Once    [COMPLETED] HYDROmorphone (Dilaudid) 1 MG/ML injection 0.5 mg  0.5 mg Intravenous Q30 Min PRN    [COMPLETED] iopamidol 76% (ISOVUE-370) injection for power injector  100 mL Intravenous ONCE PRN    [COMPLETED] piperacillin-tazobactam (Zosyn) 4.5 g in dextrose 5% 100 mL IVPB-ADDV  4.5 g Intravenous Once    enoxaparin (Lovenox) 40 MG/0.4ML SUBQ injection 40 mg  40 mg Subcutaneous Daily    acetaminophen (Tylenol Extra Strength) tab 500 mg  500 mg Oral Q4H PRN    melatonin tab 3 mg  3 mg Oral Nightly PRN    ondansetron (Zofran) 4 MG/2ML injection 4 mg  4 mg Intravenous Q6H PRN    metoclopramide (Reglan) 5 mg/mL injection 10 mg  10 mg Intravenous Q8H PRN    benzonatate (Tessalon) cap 200 mg  200 mg Oral TID PRN    glycerin-hypromellose- (Artificial Tears) 0.2-0.2-1 % ophthalmic solution 1 drop  1 drop Both Eyes QID PRN    sodium chloride (Saline Mist) 0.65 % nasal solution 1 spray  1 spray Each Nare Q3H PRN    hydrALAzine (Apresoline) 20 mg/mL injection 10 mg  10 mg Intravenous Q6H PRN    oxyCODONE immediate release tab 5 mg  5 mg Oral Q4H PRN    Or    oxyCODONE immediate release tab 10 mg  10 mg Oral Q4H PRN   [2]   Medications Prior to Admission   Medication Sig Dispense Refill Last Dose/Taking    AMLODIPINE 5 MG Oral Tab TAKE 1 TABLET(5 MG) BY MOUTH DAILY 90 tablet 1 5/26/2025 Evening    famciclovir 500 MG Oral Tab Take 1 tablet (500 mg total) by mouth 3 (three) times daily. (Patient not taking: Reported on 5/26/2025) 42 tablet 5 Not Taking   [3]   Past Surgical History:  Procedure  Laterality Date    Colonoscopy      Hip replacement surgery Right 09/12/2023    Neck/chest procedure unlisted  1988    right neck lymph    Other Right 2018    Right knee repair, not replacement.     Repair of nasal septum  1998   [4]   Social History  Socioeconomic History    Marital status:    Tobacco Use    Smoking status: Never    Smokeless tobacco: Never   Vaping Use    Vaping status: Never Used   Substance and Sexual Activity    Alcohol use: Yes     Alcohol/week: 0.0 standard drinks of alcohol     Comment: 1 drink a week    Drug use: No   Other Topics Concern    Caffeine Concern Yes     Comment: 1 cup of coffee daily    Exercise Yes     Comment: 4-5 days a week    Seat Belt Yes

## 2025-05-27 NOTE — H&P
[facility]  History and Physical     Femi Paul Patient Status:  Emergency    1955 MRN [id number]   Location [facility] IN [place] Attending He Llanos, DO   Hosp Day # 0 PCP Martin Anton MD     Chief Complaint: abdominal pain    Subjective:    History of Present Illness:     Femi Paul is a 69 year old male with PMHx HTN/ HLD/ SVT who presented to the hospital for abdominal pain. His pain began suddenly while at a barbeque this afternoon. It was in his epigastric region with radiation to his left anterior chest rated 5/10 and \"just hurt\". It was decreased with pain medication and had no exacerbating factors He had nausea but denied any emesis, fever, chills, diarrhea, constipation.    History/Other:    Past Medical History:  Past Medical History[1]  Past Surgical History:   Past Surgical History[2]   Family History:   Family History[3]  Social History:    reports that he has never smoked. He has never used smokeless tobacco. He reports current alcohol use. He reports that he does not use drugs.     Allergies: Allergies[4]    Medications:  Medications Ordered Prior to Encounter[5]    Review of Systems:   A comprehensive review of systems was completed.    Pertinent positives and negatives noted in the HPI.    Objective:   Physical Exam:    BP (!) 177/89   Pulse 82   Temp 98.8 °F (37.1 °C) (Temporal)   Resp 18   Ht 6' (1.829 m)   Wt 205 lb (93 kg)   SpO2 95%   BMI 27.80 kg/m²   General: No acute distress, Alert  Respiratory: No rhonchi, no wheezes  Cardiovascular: S1, S2. Regular rate and rhythm  Abdomen: Soft, Non-tender, non-distended, positive bowel sounds  Neuro: No new focal deficits  Extremities: No edema    Results:    Labs:      Labs Last 24 Hours:    Recent Labs   Lab 25   RBC 5.08   HGB 15.1   HCT 43.7   MCV 86.0   MCH 29.7   MCHC 34.6   RDW 12.8   NEPRELIM 4.90   WBC 7.9   .0       Recent Labs   Lab 25    *   BUN 25*   CREATSERUM 1.14   EGFRCR 69   CA 10.3   ALB 5.6*      K 3.7      CO2 26.0   ALKPHO 117   AST 39*   ALT 80*   BILT 0.4   TP 8.0       Estimated Glomerular Filtration Rate: 69 mL/min/1.73m2 (result from lab).    Lab Results   Component Value Date    INR 0.99 05/26/2025    INR 1.00 08/18/2023       Recent Labs   Lab 05/26/25  1840   TROPHS 6       No results for input(s): \"TROP\", \"PBNP\" in the last 168 hours.    No results for input(s): \"PCT\" in the last 168 hours.    Imaging: Imaging data reviewed in Epic.    Assessment & Plan:      #Choledocholithiasis  #elevated aminotransferases  -AST 39, ALT 80  -CT showing mild intra and extrahepatic biliary dilation, 6mm calculus in distal CBD, concentric bladder wall thickening  -met 1 SIRS criteria: RR 23, no signs of cholangitis at present  -pain control: tylenol, oxycodone  -cont to trend LFT's  -GI c/s in ED  -general surgery c/s in ED    #acute hypoxic respiratory failure  -SpO2 as low as 86% on room air  -chest xray without acute process  -suspect 2/2 narcotic administration and splinting from pain  -wean O2 as able with goal >94%  -incentive spirometry  -limit narcotics as able    #HTN urgency  -BP as high as 209/92  -suspect in part 2/2 pain  -goal 25% reduction in BP over 24 hrs  -hydralazine prn  -monitor on telemetry  -cont home amlodipine      Plan of care discussed with ED physician    Klaudia Tate DO    Supplementary Documentation:     The 21st Century Cures Act makes medical notes like these available to patients in the interest of transparency. Please be advised this is a medical document. Medical documents are intended to carry relevant information, facts as evident, and the clinical opinion of the practitioner. The medical note is intended as peer to peer communication and may appear blunt or direct. It is written in medical language and may contain abbreviations or verbiage that are unfamiliar.                                        [1]   Past Medical History:   Calculus of kidney    Essential hypertension    Essential hypertension    High blood pressure    History of COVID-19    Pt cant recall exact month. Had cold like S/S for a couple of days.    Osteoarthritis    Paroxysmal supraventricular tachycardia (HCC)    PONV (postoperative nausea and vomiting)    Visual impairment    glasses   [2]   Past Surgical History:  Procedure Laterality Date    Colonoscopy      Hip replacement surgery Right 09/12/2023    Neck/chest procedure unlisted  1988    right neck lymph    Other Right 2018    Right knee repair, not replacement.     Repair of nasal septum  1998   [3]   Family History  Problem Relation Age of Onset    Cancer Father         Lung   [4] No Known Allergies  [5]   No current facility-administered medications on file prior to encounter.     Current Outpatient Medications on File Prior to Encounter   Medication Sig Dispense Refill    AMLODIPINE 5 MG Oral Tab TAKE 1 TABLET(5 MG) BY MOUTH DAILY 90 tablet 1    famciclovir 500 MG Oral Tab Take 1 tablet (500 mg total) by mouth 3 (three) times daily. (Patient not taking: Reported on 5/26/2025) 42 tablet 5

## 2025-05-27 NOTE — PLAN OF CARE
NURSING ADMISSION NOTE      Patient admitted via Ambulance  Oriented to room.  Safety precautions initiated.  Bed in low position.  Call light in reach.    Alert x 4. VSS on 2 L NC. Medicated for pain. POC discussed. All current needs met.

## 2025-05-27 NOTE — OPERATIVE REPORT
Femi Paul Patient Status:  Inpatient    1955 MRN [id number]   [facility] Attending Gurinder Mueller MD   Date 2025 PCP Martin Anton MD     PREOPERATIVE DIAGNOSIS/INDICATION: Abnormal LFT's, abdominal pain, choledocholithiasis  POSTOPERTATIVE DIAGNOSIS: Same  PROCEDURE PERFORMED: ERCP with biliary sphincterotomy and balloon stone extraction  TIME OUT WAS PERFORMED    SEDATION: MAC sedation provided by General Anesthesia    INFORMED CONSENT: Risks, benefits and alternatives to the procedure were explained to the patient including but not limited to bleeding, infection, perforation, adverse drug reactions, pancreatitis and the need for hospitalization and surgery if this occurs, the patient understands and agrees to procedure.  PROCEDURE DESCRIPTION: The therapeutic side viewing duodenoscope was introduced into the patient’s mouth, hypo pharynx, esophagus, stomach and the first and second portion of the duodenum, straightening of the endoscope was performed to obtain a direct view of the major ampulla. Careful but limited examination of the above described areas was performed on withdrawal of the endoscope. The patient tolerated the procedure well and there were no immediate complications noted during the procedure, the patient was transported to the recovery area in stable condition.  FINDINGS:  DUODENUM: Normal  MAJOR AMPULLA: normal  ERP: Not attempted  ERC: The CBD showed filling defects c/w choledocholithiasis, the CHD, the confluence of the right and left hepatic ducts and the intrahepatics appear wnl, the cystic duct and the gallbladder were not visualized.  THERAPEUTICS: The CBD was cannulated using a standard 0.035 [employer]  Hydratome RX44, 20mm cut wire, deep cannulation was performed with the help of a 0.035 straight Acro[employer] wire 260cm in length, an adequate biliary sphincterotomy was performed with standard ERBE settings, there were no  immediate complication noted. Biliary balloon stone extraction was performed the help of [employer]’s 9-12mm triple lumen stone extraction balloon, yellow stones were retrieved, occlusion cholangiogram was performed,. Bile duct clearance appear complete. Mild oozing controlled with 10 Fr gold probe  RECOMMENDATIONS:   Post ERCP precautions, watch for bleeding, infection, perforation, adverse drug reactions and pancreatitis.  CMP, CBC in AM.  Call status report post procedure.  Cholecystectomy per general surgery  Laz Barreto MD  5/27/2025  11:23 AM

## 2025-05-27 NOTE — OPERATIVE REPORT
Femi Paul Patient Status:  Inpatient    1955 MRN [id number]   [facility]IN [facility] Attending Gurinder Mueller MD   Date 2025 PCP Martin Anton MD     PREOPERATIVE DIAGNOSIS/INDICATION: Abdominal pain, elevated LFT's  POSTOPERTATIVE DIAGNOSIS: Same, choledocholithiasis  PROCEDURE PERFORMED: EUS/EGD  TIME OUT WAS PERFORMED    SEDATION: MAC sedation provided by General Anesthesia    INFORMED CONSENT: Risks, benefits and alternatives to the procedure were explained to the patient including but not limited to bleeding, infection, perforation, adverse drug reactions, pancreatitis and the need for hospitalization and surgery if this occurs, the patient understands and agrees to procedure.  PROCEDURE DESCRIPTION: The therapeutic side viewing echoendoscope was introduced into the patient’s mouth, hypo pharynx, esophagus, stomach and the first and second portion of the duodenum, straightening of the endoscope was performed to obtain a direct view of the major ampulla. Careful but limited examination of the above described areas was performed on withdrawal of the endoscope. The patient tolerated the procedure well and there were no immediate complications noted during the procedure, the patient was transported to the recovery area in stable condition.  FINDINGS:  DUODENUM: Normal  MAJOR AMPULLA: normal  ECHO: Imaging was performed through the esophagus, stomach and duodenum. The visualized pancreatic parenchyma and the main PD at the head appear wnl, the biliary tree showed echogenic filling defects,  RECOMMENDATIONS:   Post EUS/EGD precautions, watch for bleeding, infection, perforation, adverse drug reactions and pancreatitis.  Proceed with ERCP    Laz Barreto MD  2025  11:22 AM

## 2025-05-27 NOTE — CONSULTS
[facility]  Report of Consultation    Femi Paul Patient Status:  Inpatient    1955 MRN [id number]   [facility] Attending Gurinder Mueller MD   Hosp Day # 1 PCP Martin Anton MD     Requesting Physician:  Dr. Llanos    Reason for Consultation:  Choledocholithiasis     Chief Complaint:  Abdominal pain     History of Present Illness:  Femi Paul is a 69 year old male with a past medical history significant for HTN, nephrolithiasis who presents to [facility] on 2025 with concerns of diffuse abdominal pain that began earlier that evening. He reports mild nausea but denies vomiting. He reports constipation for the last 3 days. He denies fever or chills.     Upon presentation to the hospital, he was afebrile and hypertensive (209/92). Laboratory workup revealed no leukocytosis, WBC 7.9, hemoglobin 15.1, platelets 155,000.  CMP revealed no gross electrolyte abnormalities, no acute kidney injury, creatinine 1.14. LFTs mildly elevated, AST 39, ALT 80. Total bilirubin 0.4. CT of the abdomen and pelvis revealed mild intra and extrahepatic biliary dilatation. There is an approximately 6 mm stone in the distal common bile duct.     He has a past medical history significant for HTN, nephrolithiasis. He reports no previous abdominal surgeries. He denies use of blood thinning medication.     History:  Past Medical History[1]  Past Surgical History[2]  Family History[3]   reports that he has never smoked. He has never used smokeless tobacco. He reports current alcohol use. He reports that he does not use drugs.    Allergies:  Allergies[4]    Medications:  Prescriptions Prior to Admission[5]    Current Hospital Medications[6]    Review of Systems:    Allergic/Immuno:  Review of patient's allergies performed.  Cardiovascular:  Negative for cool extremity and irregular heartbeat/palpitations  Constitutional:  Negative for decreased activity, fever,  irritability and lethargy  Endocrine:  Negative for abnormal sleep patterns, increased activity, polydipsia and polyphagia  ENMT:  Negative for ear drainage, hearing loss and nasal drainage  Eyes:  Negative for eye discharge and vision loss  Gastrointestinal:  Positive for abdominal pain, nausea, constipation. Negative for decreased appetite, diarrhea and vomiting  Genitourinary:  Negative for dysuria and hematuria  Hema/Lymph:  Negative for easy bleeding and easy bruising  Integumentary:  Negative for pruritus and rash  Musculoskeletal:  Negative for bone/joint symptoms  Neurological:  Negative for gait disturbance  Psychiatric:  Negative for inappropriate interaction and psychiatric symptoms  Respiratory:  Negative for cough, dyspnea and wheezing     Physical Exam:  BP (!) 175/79 (BP Location: Left arm)   Pulse 90   Temp 98.4 °F (36.9 °C) (Oral)   Resp 14   Ht 6' (1.829 m)   Wt 205 lb (93 kg)   SpO2 94%   BMI 27.80 kg/m²     General: Awake, Alert, Cooperative.  No apparent distress.  HEENT: Exam is unremarkable.  Without scleral icterus.  Mucous membranes are moist. Oropharynx is clear.  Neck: Full range of motion to flexion and extension, lateral rotation and lateral flexion of cervical spine.  No JVD. Supple.   Lungs: No respiratory distress. NC in place.   Abdomen:  Soft, non-distended, tender to palpation diffusely, especially in right upper quadrant and epigastrium, with no rebound or guarding.    Extremities:  No lower extremity edema noted.  Without clubbing or cyanosis.    Skin: Normal texture and turgor.  Lymphatic:  No palpable cervical lymphadenopathy.  Neurologic: Cranial nerves are grossly intact.  Motor strength and sensory examination is grossly normal.  No focal neurologic deficit.    Laboratory Data:  Recent Labs   Lab 05/26/25  1840 05/27/25  0448   RBC 5.08 4.94   HGB 15.1 14.4   HCT 43.7 42.1   MCV 86.0 85.2   MCH 29.7 29.1   MCHC 34.6 34.2   RDW 12.8 12.9   NEPRELIM 4.90 15.02*   WBC  7.9 17.0*   .0 172.0       Recent Labs   Lab 05/26/25  1840 05/27/25  0448   * 170*   BUN 25* 17   CREATSERUM 1.14 1.05   CA 10.3 9.4   ALB 5.6* 5.0*    137   K 3.7 4.0    101   CO2 26.0 25.0   ALKPHO 117 137*   AST 39* 225*   ALT 80* 287*   BILT 0.4 0.8   TP 8.0 7.4         Recent Labs   Lab 05/26/25  1840   PTP 12.9   INR 0.99         CT ABDOMEN+PELVIS(CONTRAST ONLY)(CPT=74177)  Result Date: 5/26/2025  CONCLUSION:   1. Mild intra and extrahepatic biliary dilatation.  There is an approximately 6 mm calculus of the distal common bile duct, likely the source of obstruction.  Recommend correlation with LFTs.  2. Concentric bladder wall thickening, likely accentuated by under distention, though cystitis may be contributory.  Recommend correlation with urinalysis.   LOCATION:  [facility]   Dictated by (CST): Aurelio Lyons MD on 5/26/2025 at 8:49 PM     Finalized by (CST): Aurelio Lyons MD on 5/26/2025 at 8:56 PM       XR CHEST AP PORTABLE  (CPT=71045)  Result Date: 5/26/2025  CONCLUSION:  Heart size upper limits normal.  No pulmonary edema or focal airspace consolidation.  No significant pleural effusion or appreciable pneumothorax.  Osteoarthritis of the shoulders and spine.   LOCATION:  [facility]      Dictated by (CST): Aurelio Lyons MD on 5/26/2025 at 7:49 PM     Finalized by (CST): Aurelio Lyons MD on 5/26/2025 at 7:49 PM                 Medical Decision Making         Impression:  Problem List[7]    Choledocholithiasis    Plan:  The patient was discussed with Dr. Iglesias who recommends patient undergo laparoscopic cholecystectomy with ICG, possible open once ERCP complete. He has been tentatively added on to her operating schedule for tomorrow morning.   GI team on consult; appreciate their recommendations regarding timing of ERCP. Discussed with GI NP   Continue NPO pending procedure; okay for clear liquid diet following ERCP from a general surgery standpoint and NPO at midnight.   Analgesics and  antiemetics as needed  Encourage ambulation and up to chair  Medical management per primary   DVT prophyalxis with lovenox.     Pearl Lance PA-C  5/27/2025  8:33 AM  Is this a shared or split note between Advanced Practice Provider and Physician? Yes         The patient was independently interviewed and examined by myself.  More than 50% of clinical time and 100% MDM was performed by myself.  I have reviewed the serology and imaging myself from this admission.  I do concur with the interpretation of the radiologist except were noted.  I personally performed the services described in this documentation and I agree with the assessment and plan as set forth above and discussed with the physician assistant.       69-year-old gentleman who presents to the emergency department yesterday with complaint of progressively worsening abdominal pain.  The patient reports that over the past few weeks he has had transient postprandial GI symptoms.  As these symptoms are mild and resolve spontaneously, he did not seek evaluation.  This episode of severe abdominal pain started yesterday evening shortly after eating a meal of barbecued food earlier today.  The pain is primarily in the epigastrium and radiates to the bilateral upper quadrants.  The pain is described as crampy with sharp exacerbations.  The patient describes nausea but denies any emesis.  He denies any diarrhea or recent change in his bowel habits.    Past medical-surgical history-hypertension, SVT, kidney stones.  Right total hip replacement.  No prior history of abdominal surgery.  The patient is not anticoagulated    On physical examination the patient's abdomen is mildly distended, soft, tender to deep palpation in the epigastrium and right upper quadrant.  There is mild voluntary guarding.  There is no involuntary guarding, percussion tenderness or rebound.    Workup in the ED revealed glucose of 170, initial SGOT and SGPT mildly elevated, this morning  alkaline phosphatase elevated 137, SGOT 225, SGPT 287.  Leukocytosis this morning 17,000, yesterday 7.9.  UA negative.  Chest x-ray essentially negative for effusion, pneumothorax, consolidation, edema.  CT scan abdomen pelvis reveals mild extrahepatic and intrahepatic ductal dilatation.  6 mm calculus in the distal CBD.  Cholelithiasis and gallbladder distention was noted.  Moderate diffuse parenchymal atrophy of the pancreas.  Osteoarthritis of the hip and spine with a right total hip prosthesis    Treatment options were reviewed in detail with Shayan and his wife Maki at the bedside.  The patient has been evaluated by GI service and ERCP with stone extraction is scheduled for later today.  Surgical treatment options including laparoscopic cholecystectomy with cholangiogram was discussed with Shayan and his wife.  This would be scheduled for tomorrow after ERCP and stone extraction today.  The procedure was reviewed in detail.  Shayan and his wife have no further questions at this time and do wish to proceed with surgery tomorrow as discussed.    The risks, benefits, complications, possible outcomes and alternatives of the procedure were explained to the patient in detail.  Potential complications of this procedure and as with any surgical procedure and anesthesia were reviewed including but not limited to bleeding, infection, thromboembolic event, pneumonia were discussed.  Expected postoperative pain, recuperation and postoperative course and possible complications were also reviewed.  All questions from the patient were answered in detail.  The patient did verbalize understanding and does not have any further questions at this time.  The patient does wish to proceed with the proposed procedure.      KASEY Iglesias MD, FACS    Please note that this report has been produced using speech recognition software and may contain errors related to that system including but not limited to errors in grammar, punctuation and spelling  as well as words and phrases that possibly may have been recognized inappropriately.  If there are any questions or concerns please contact the dictating provider for clarification.  The 21st Century Cures Act makes medical notes like these available to patients in the interest of trans parency. Please be advised this is a medical document. Medical documents are intended to carry relevant information, facts as evident, and the clinical opinion of the practitioner. The medical note is intended as peer to peer communication and may appear blunt or direct. It is written in medical language and may contain abbreviations or verbiage that are unfamiliar.  If there are any questions or concerns please contact the dictating provider for clarification.         [1]   Past Medical History:   Calculus of kidney    Essential hypertension    Essential hypertension    High blood pressure    History of COVID-19    Pt cant recall exact month. Had cold like S/S for a couple of days.    Osteoarthritis    Paroxysmal supraventricular tachycardia (HCC)    PONV (postoperative nausea and vomiting)    Visual impairment    glasses   [2]   Past Surgical History:  Procedure Laterality Date    Colonoscopy      Hip replacement surgery Right 09/12/2023    Neck/chest procedure unlisted  1988    right neck lymph    Other Right 2018    Right knee repair, not replacement.     Repair of nasal septum  1998   [3]   Family History  Problem Relation Age of Onset    Cancer Father         Lung   [4] No Known Allergies  [5]   Medications Prior to Admission   Medication Sig    AMLODIPINE 5 MG Oral Tab TAKE 1 TABLET(5 MG) BY MOUTH DAILY    famciclovir 500 MG Oral Tab Take 1 tablet (500 mg total) by mouth 3 (three) times daily. (Patient not taking: Reported on 5/26/2025)   [6]   Current Facility-Administered Medications:     sodium chloride 0.9% infusion, , Intravenous, Continuous    amLODIPine (Norvasc) tab 5 mg, 5 mg, Oral, Daily    HYDROmorphone (Dilaudid) 1  MG/ML injection 0.1 mg, 0.1 mg, Intravenous, Q2H PRN **OR** HYDROmorphone (Dilaudid) 1 MG/ML injection 0.2 mg, 0.2 mg, Intravenous, Q2H PRN **OR** HYDROmorphone (Dilaudid) 1 MG/ML injection 0.4 mg, 0.4 mg, Intravenous, Q2H PRN    enoxaparin (Lovenox) 40 MG/0.4ML SUBQ injection 40 mg, 40 mg, Subcutaneous, Daily    acetaminophen (Tylenol Extra Strength) tab 500 mg, 500 mg, Oral, Q4H PRN    melatonin tab 3 mg, 3 mg, Oral, Nightly PRN    ondansetron (Zofran) 4 MG/2ML injection 4 mg, 4 mg, Intravenous, Q6H PRN    metoclopramide (Reglan) 5 mg/mL injection 10 mg, 10 mg, Intravenous, Q8H PRN    benzonatate (Tessalon) cap 200 mg, 200 mg, Oral, TID PRN    glycerin-hypromellose- (Artificial Tears) 0.2-0.2-1 % ophthalmic solution 1 drop, 1 drop, Both Eyes, QID PRN    sodium chloride (Saline Mist) 0.65 % nasal solution 1 spray, 1 spray, Each Nare, Q3H PRN    hydrALAzine (Apresoline) 20 mg/mL injection 10 mg, 10 mg, Intravenous, Q6H PRN    oxyCODONE immediate release tab 5 mg, 5 mg, Oral, Q4H PRN **OR** oxyCODONE immediate release tab 10 mg, 10 mg, Oral, Q4H PRN **OR** oxyCODONE immediate release tab 15 mg, 15 mg, Oral, Q4H PRN  [7]   Patient Active Problem List  Diagnosis    Vitamin D deficiency    Brachial neuritis or radiculitis NOS    Calculus of kidney    Herpes simplex virus (HSV) infection    SVT (supraventricular tachycardia) (HCC)    Essential hypertension    Diverticulosis    Internal hemorrhoids    Choledocholithiasis

## 2025-05-27 NOTE — PLAN OF CARE
2230      Problem: PAIN - ADULT  Goal: Verbalizes/displays adequate comfort level or patient's stated pain goal  Description: INTERVENTIONS:  - Encourage pt to monitor pain and request assistance  - Assess pain using appropriate pain scale  - Administer analgesics based on type and severity of pain and evaluate response  - Implement non-pharmacological measures as appropriate and evaluate response  - Consider cultural and social influences on pain and pain management  - Manage/alleviate anxiety  - Utilize distraction and/or relaxation techniques  - Monitor for opioid side effects  - Notify MD/LIP if interventions unsuccessful or patient reports new pain  - Anticipate increased pain with activity and pre-medicate as appropriate  Outcome: Progressing     Problem: SAFETY ADULT - FALL  Goal: Free from fall injury  Description: INTERVENTIONS:  - Assess pt frequently for physical needs  - Identify cognitive and physical deficits and behaviors that affect risk of falls.  - [place] fall precautions as indicated by assessment.  - Educate pt/family on patient safety including physical limitations  - Instruct pt to call for assistance with activity based on assessment  - Modify environment to reduce risk of injury  - Provide assistive devices as appropriate  - Consider OT/PT consult to assist with strengthening/mobility  - Encourage toileting schedule  Outcome: Progressing     Problem: DISCHARGE PLANNING  Goal: Discharge to home or other facility with appropriate resources  Description: INTERVENTIONS:  - Identify barriers to discharge w/pt and caregiver  - Include patient/family/discharge partner in discharge planning  - Arrange for needed discharge resources and transportation as appropriate  - Identify discharge learning needs (meds, wound care, etc)  - Arrange for interpreters to assist at discharge as needed  - Consider post-discharge preferences of patient/family/discharge partner  - Complete POLST form as  appropriate  - Assess patient's ability to be responsible for managing their own health  - Refer to Case Management Department for coordinating discharge planning if the patient needs post-hospital services based on physician/LIP order or complex needs related to functional status, cognitive ability or social support system  Outcome: Progressing     Problem: RESPIRATORY - ADULT  Goal: Achieves optimal ventilation and oxygenation  Description: INTERVENTIONS:  - Assess for changes in respiratory status  - Assess for changes in mentation and behavior  - Position to facilitate oxygenation and minimize respiratory effort  - Oxygen supplementation based on oxygen saturation or ABGs  - Provide Smoking Cessation handout, if applicable  - Encourage broncho-pulmonary hygiene including cough, deep breathe, Incentive Spirometry  - Assess the need for suctioning and perform as needed  - Assess and instruct to report SOB or any respiratory difficulty  - Respiratory Therapy support as indicated  - Manage/alleviate anxiety  - Monitor for signs/symptoms of CO2 retention  Outcome: Progressing     Problem: GENITOURINARY - ADULT  Goal: Absence of urinary retention  Description: INTERVENTIONS:  - Assess patient’s ability to void and empty bladder  - Monitor intake/output and perform bladder scan as needed  - Follow urinary retention protocol/standard of care  - Consider collaborating with pharmacy to review patient's medication profile  - Implement strategies to promote bladder emptying  Outcome: Progressing     Problem: SKIN/TISSUE INTEGRITY - ADULT  Goal: Skin integrity remains intact  Description: INTERVENTIONS  - Assess and document risk factors for pressure ulcer development  - Assess and document skin integrity  - Monitor for areas of redness and/or skin breakdown  - Initiate interventions, skin care algorithm/standards of care as needed  Outcome: Progressing   Alert and orient x 4 , on o2 2l/nc , c pox , o2 sat 96% , rating pain  8/10 . Provided pain medicine as needed . Elevated bp , asymptomatic , after pain medicine  bp was better . NPO for endo today , plan surgery tomorrow . Up in room , abdomen soft , tender . Family at bedside . Plan of care discussed , answered all questions . Verbalized understanding . Will continue to monitor   1230  received patient from endo , alert and orient x 4 , on room air , vitals stable , abdomen very tender , updated   Poc  With patient and family . Started clear liquid diet , npo after midnight . Continue to monitor

## 2025-05-27 NOTE — ED QUICK NOTES
Orders for admission, patient is aware of plan and ready to go upstairs. Any questions, please call ED RN rachael  at extension [phone].     Patient Covid vaccination status: Fully vaccinated     COVID Test Ordered in ED: None    COVID Suspicion at Admission: N/A    Running Infusions: Medication Infusions[1]     Mental Status/LOC at time of transport: A+O x4    Other pertinent information: O2 was needed s/p Dilaudid administration.  CIWA score: N/A   NIH score:  N/A             [1]   
Pt ambulated to bathroom with slow steady gait, urine specimen obtained  
Report to [facility] medics  
ZAY Esquivel

## 2025-05-28 ENCOUNTER — ANESTHESIA (OUTPATIENT)
Dept: SURGERY | Facility: HOSPITAL | Age: 70
End: 2025-05-28
Payer: COMMERCIAL

## 2025-05-28 ENCOUNTER — ANESTHESIA EVENT (OUTPATIENT)
Dept: SURGERY | Facility: HOSPITAL | Age: 70
End: 2025-05-28
Payer: COMMERCIAL

## 2025-05-28 LAB
ALBUMIN SERPL-MCNC: 4.2 G/DL (ref 3.2–4.8)
ALBUMIN/GLOB SERPL: 1.8 {RATIO} (ref 1–2)
ALP LIVER SERPL-CCNC: 101 U/L (ref 45–117)
ALT SERPL-CCNC: 149 U/L (ref 10–49)
ANION GAP SERPL CALC-SCNC: 8 MMOL/L (ref 0–18)
AST SERPL-CCNC: 58 U/L (ref ?–34)
BASOPHILS # BLD AUTO: 0.04 X10(3) UL (ref 0–0.2)
BASOPHILS NFR BLD AUTO: 0.3 %
BILIRUB SERPL-MCNC: 1.2 MG/DL (ref 0.2–1.1)
BUN BLD-MCNC: 16 MG/DL (ref 9–23)
CALCIUM BLD-MCNC: 9.1 MG/DL (ref 8.7–10.6)
CHLORIDE SERPL-SCNC: 102 MMOL/L (ref 98–112)
CO2 SERPL-SCNC: 26 MMOL/L (ref 21–32)
CREAT BLD-MCNC: 1.13 MG/DL (ref 0.7–1.3)
EGFRCR SERPLBLD CKD-EPI 2021: 70 ML/MIN/1.73M2 (ref 60–?)
EOSINOPHIL # BLD AUTO: 0.13 X10(3) UL (ref 0–0.7)
EOSINOPHIL NFR BLD AUTO: 0.9 %
ERYTHROCYTE [DISTWIDTH] IN BLOOD BY AUTOMATED COUNT: 13.2 %
GLOBULIN PLAS-MCNC: 2.3 G/DL (ref 2–3.5)
GLUCOSE BLD-MCNC: 125 MG/DL (ref 70–99)
HCT VFR BLD AUTO: 38 % (ref 39–53)
HGB BLD-MCNC: 12.6 G/DL (ref 13–17.5)
IMM GRANULOCYTES # BLD AUTO: 0.1 X10(3) UL (ref 0–1)
IMM GRANULOCYTES NFR BLD: 0.7 %
LYMPHOCYTES # BLD AUTO: 0.92 X10(3) UL (ref 1–4)
LYMPHOCYTES NFR BLD AUTO: 6.5 %
MCH RBC QN AUTO: 29 PG (ref 26–34)
MCHC RBC AUTO-ENTMCNC: 33.2 G/DL (ref 31–37)
MCV RBC AUTO: 87.6 FL (ref 80–100)
MONOCYTES # BLD AUTO: 0.84 X10(3) UL (ref 0.1–1)
MONOCYTES NFR BLD AUTO: 6 %
NEUTROPHILS # BLD AUTO: 12.07 X10 (3) UL (ref 1.5–7.7)
NEUTROPHILS # BLD AUTO: 12.07 X10(3) UL (ref 1.5–7.7)
NEUTROPHILS NFR BLD AUTO: 85.6 %
OSMOLALITY SERPL CALC.SUM OF ELEC: 285 MOSM/KG (ref 275–295)
PLATELET # BLD AUTO: 149 10(3)UL (ref 150–450)
POTASSIUM SERPL-SCNC: 3.8 MMOL/L (ref 3.5–5.1)
PROT SERPL-MCNC: 6.5 G/DL (ref 5.7–8.2)
RBC # BLD AUTO: 4.34 X10(6)UL (ref 3.8–5.8)
SODIUM SERPL-SCNC: 136 MMOL/L (ref 136–145)
WBC # BLD AUTO: 14.1 X10(3) UL (ref 4–11)

## 2025-05-28 PROCEDURE — 47562 LAPAROSCOPIC CHOLECYSTECTOMY: CPT

## 2025-05-28 PROCEDURE — 0FT44ZZ RESECTION OF GALLBLADDER, PERCUTANEOUS ENDOSCOPIC APPROACH: ICD-10-PCS | Performed by: SURGERY

## 2025-05-28 PROCEDURE — 47562 LAPAROSCOPIC CHOLECYSTECTOMY: CPT | Performed by: SURGERY

## 2025-05-28 PROCEDURE — BF50200 OTHER IMAGING OF BILE DUCTS USING FLUORESCING AGENT, INDOCYANINE GREEN DYE, INTRAOPERATIVE: ICD-10-PCS | Performed by: SURGERY

## 2025-05-28 PROCEDURE — BF13YZZ FLUOROSCOPY OF GALLBLADDER AND BILE DUCTS USING OTHER CONTRAST: ICD-10-PCS | Performed by: SURGERY

## 2025-05-28 PROCEDURE — 99232 SBSQ HOSP IP/OBS MODERATE 35: CPT | Performed by: HOSPITALIST

## 2025-05-28 RX ORDER — LABETALOL HYDROCHLORIDE 5 MG/ML
5 INJECTION, SOLUTION INTRAVENOUS EVERY 5 MIN PRN
Status: DISCONTINUED | OUTPATIENT
Start: 2025-05-28 | End: 2025-05-28 | Stop reason: HOSPADM

## 2025-05-28 RX ORDER — HYDROCODONE BITARTRATE AND ACETAMINOPHEN 5; 325 MG/1; MG/1
1 TABLET ORAL ONCE AS NEEDED
Status: DISCONTINUED | OUTPATIENT
Start: 2025-05-28 | End: 2025-05-28 | Stop reason: HOSPADM

## 2025-05-28 RX ORDER — SODIUM CHLORIDE, SODIUM LACTATE, POTASSIUM CHLORIDE, CALCIUM CHLORIDE 600; 310; 30; 20 MG/100ML; MG/100ML; MG/100ML; MG/100ML
INJECTION, SOLUTION INTRAVENOUS CONTINUOUS PRN
Status: DISCONTINUED | OUTPATIENT
Start: 2025-05-28 | End: 2025-05-28 | Stop reason: SURG

## 2025-05-28 RX ORDER — ONDANSETRON 2 MG/ML
4 INJECTION INTRAMUSCULAR; INTRAVENOUS EVERY 6 HOURS PRN
Status: DISCONTINUED | OUTPATIENT
Start: 2025-05-28 | End: 2025-05-28

## 2025-05-28 RX ORDER — SODIUM CHLORIDE 9 MG/ML
INJECTION, SOLUTION INTRAVENOUS CONTINUOUS
Status: DISCONTINUED | OUTPATIENT
Start: 2025-05-28 | End: 2025-05-29

## 2025-05-28 RX ORDER — HYDROMORPHONE HYDROCHLORIDE 1 MG/ML
INJECTION, SOLUTION INTRAMUSCULAR; INTRAVENOUS; SUBCUTANEOUS
Status: COMPLETED
Start: 2025-05-28 | End: 2025-05-28

## 2025-05-28 RX ORDER — OXYCODONE HYDROCHLORIDE 5 MG/1
5 TABLET ORAL EVERY 4 HOURS PRN
Refills: 0 | Status: DISCONTINUED | OUTPATIENT
Start: 2025-05-28 | End: 2025-05-29

## 2025-05-28 RX ORDER — NEOSTIGMINE METHYLSULFATE 1 MG/ML
INJECTION INTRAVENOUS AS NEEDED
Status: DISCONTINUED | OUTPATIENT
Start: 2025-05-28 | End: 2025-05-28 | Stop reason: SURG

## 2025-05-28 RX ORDER — METOCLOPRAMIDE HYDROCHLORIDE 5 MG/ML
10 INJECTION INTRAMUSCULAR; INTRAVENOUS EVERY 8 HOURS PRN
Status: DISCONTINUED | OUTPATIENT
Start: 2025-05-28 | End: 2025-05-28 | Stop reason: HOSPADM

## 2025-05-28 RX ORDER — DEXAMETHASONE SODIUM PHOSPHATE 4 MG/ML
VIAL (ML) INJECTION AS NEEDED
Status: DISCONTINUED | OUTPATIENT
Start: 2025-05-28 | End: 2025-05-28 | Stop reason: SURG

## 2025-05-28 RX ORDER — ACETAMINOPHEN 500 MG
1000 TABLET ORAL EVERY 8 HOURS SCHEDULED
Status: DISCONTINUED | OUTPATIENT
Start: 2025-05-28 | End: 2025-05-29

## 2025-05-28 RX ORDER — CEFAZOLIN SODIUM 1 G/3ML
INJECTION, POWDER, FOR SOLUTION INTRAMUSCULAR; INTRAVENOUS AS NEEDED
Status: DISCONTINUED | OUTPATIENT
Start: 2025-05-28 | End: 2025-05-28 | Stop reason: SURG

## 2025-05-28 RX ORDER — HYDROCODONE BITARTRATE AND ACETAMINOPHEN 5; 325 MG/1; MG/1
2 TABLET ORAL ONCE AS NEEDED
Status: DISCONTINUED | OUTPATIENT
Start: 2025-05-28 | End: 2025-05-28 | Stop reason: HOSPADM

## 2025-05-28 RX ORDER — NALOXONE HYDROCHLORIDE 0.4 MG/ML
80 INJECTION, SOLUTION INTRAMUSCULAR; INTRAVENOUS; SUBCUTANEOUS AS NEEDED
Status: DISCONTINUED | OUTPATIENT
Start: 2025-05-28 | End: 2025-05-28 | Stop reason: HOSPADM

## 2025-05-28 RX ORDER — MIDAZOLAM HYDROCHLORIDE 1 MG/ML
1 INJECTION INTRAMUSCULAR; INTRAVENOUS EVERY 5 MIN PRN
Status: DISCONTINUED | OUTPATIENT
Start: 2025-05-28 | End: 2025-05-28 | Stop reason: HOSPADM

## 2025-05-28 RX ORDER — ENOXAPARIN SODIUM 100 MG/ML
40 INJECTION SUBCUTANEOUS DAILY
Status: DISCONTINUED | OUTPATIENT
Start: 2025-05-28 | End: 2025-05-28

## 2025-05-28 RX ORDER — MEPERIDINE HYDROCHLORIDE 25 MG/ML
12.5 INJECTION INTRAMUSCULAR; INTRAVENOUS; SUBCUTANEOUS AS NEEDED
Status: DISCONTINUED | OUTPATIENT
Start: 2025-05-28 | End: 2025-05-28 | Stop reason: HOSPADM

## 2025-05-28 RX ORDER — HYDROMORPHONE HYDROCHLORIDE 1 MG/ML
0.4 INJECTION, SOLUTION INTRAMUSCULAR; INTRAVENOUS; SUBCUTANEOUS EVERY 5 MIN PRN
Status: DISCONTINUED | OUTPATIENT
Start: 2025-05-28 | End: 2025-05-28 | Stop reason: HOSPADM

## 2025-05-28 RX ORDER — GLYCOPYRROLATE 0.2 MG/ML
INJECTION, SOLUTION INTRAMUSCULAR; INTRAVENOUS AS NEEDED
Status: DISCONTINUED | OUTPATIENT
Start: 2025-05-28 | End: 2025-05-28 | Stop reason: SURG

## 2025-05-28 RX ORDER — METOPROLOL TARTRATE 1 MG/ML
2.5 INJECTION, SOLUTION INTRAVENOUS ONCE
Status: DISCONTINUED | OUTPATIENT
Start: 2025-05-28 | End: 2025-05-28 | Stop reason: HOSPADM

## 2025-05-28 RX ORDER — METOCLOPRAMIDE HYDROCHLORIDE 5 MG/ML
10 INJECTION INTRAMUSCULAR; INTRAVENOUS EVERY 8 HOURS PRN
Status: DISCONTINUED | OUTPATIENT
Start: 2025-05-28 | End: 2025-05-28

## 2025-05-28 RX ORDER — HYDROMORPHONE HYDROCHLORIDE 1 MG/ML
0.2 INJECTION, SOLUTION INTRAMUSCULAR; INTRAVENOUS; SUBCUTANEOUS EVERY 5 MIN PRN
Status: DISCONTINUED | OUTPATIENT
Start: 2025-05-28 | End: 2025-05-28 | Stop reason: HOSPADM

## 2025-05-28 RX ORDER — ONDANSETRON 2 MG/ML
4 INJECTION INTRAMUSCULAR; INTRAVENOUS EVERY 6 HOURS PRN
Status: DISCONTINUED | OUTPATIENT
Start: 2025-05-28 | End: 2025-05-28 | Stop reason: HOSPADM

## 2025-05-28 RX ORDER — BUPIVACAINE HYDROCHLORIDE AND EPINEPHRINE 5; 5 MG/ML; UG/ML
INJECTION, SOLUTION EPIDURAL; INTRACAUDAL; PERINEURAL AS NEEDED
Status: DISCONTINUED | OUTPATIENT
Start: 2025-05-28 | End: 2025-05-28 | Stop reason: HOSPADM

## 2025-05-28 RX ORDER — ACETAMINOPHEN 500 MG
1000 TABLET ORAL ONCE AS NEEDED
Status: DISCONTINUED | OUTPATIENT
Start: 2025-05-28 | End: 2025-05-28 | Stop reason: HOSPADM

## 2025-05-28 RX ORDER — LIDOCAINE HYDROCHLORIDE 10 MG/ML
INJECTION, SOLUTION EPIDURAL; INFILTRATION; INTRACAUDAL; PERINEURAL AS NEEDED
Status: DISCONTINUED | OUTPATIENT
Start: 2025-05-28 | End: 2025-05-28 | Stop reason: SURG

## 2025-05-28 RX ORDER — HYDROMORPHONE HYDROCHLORIDE 1 MG/ML
0.4 INJECTION, SOLUTION INTRAMUSCULAR; INTRAVENOUS; SUBCUTANEOUS EVERY 2 HOUR PRN
Status: DISCONTINUED | OUTPATIENT
Start: 2025-05-28 | End: 2025-05-29

## 2025-05-28 RX ORDER — ROCURONIUM BROMIDE 10 MG/ML
INJECTION, SOLUTION INTRAVENOUS AS NEEDED
Status: DISCONTINUED | OUTPATIENT
Start: 2025-05-28 | End: 2025-05-28 | Stop reason: SURG

## 2025-05-28 RX ORDER — HYDROMORPHONE HYDROCHLORIDE 1 MG/ML
0.6 INJECTION, SOLUTION INTRAMUSCULAR; INTRAVENOUS; SUBCUTANEOUS EVERY 5 MIN PRN
Status: DISCONTINUED | OUTPATIENT
Start: 2025-05-28 | End: 2025-05-28 | Stop reason: HOSPADM

## 2025-05-28 RX ORDER — SODIUM CHLORIDE, SODIUM LACTATE, POTASSIUM CHLORIDE, CALCIUM CHLORIDE 600; 310; 30; 20 MG/100ML; MG/100ML; MG/100ML; MG/100ML
INJECTION, SOLUTION INTRAVENOUS CONTINUOUS
Status: DISCONTINUED | OUTPATIENT
Start: 2025-05-28 | End: 2025-05-28 | Stop reason: HOSPADM

## 2025-05-28 RX ORDER — MIDAZOLAM HYDROCHLORIDE 1 MG/ML
INJECTION INTRAMUSCULAR; INTRAVENOUS AS NEEDED
Status: DISCONTINUED | OUTPATIENT
Start: 2025-05-28 | End: 2025-05-28 | Stop reason: SURG

## 2025-05-28 RX ORDER — HYDROMORPHONE HYDROCHLORIDE 1 MG/ML
0.2 INJECTION, SOLUTION INTRAMUSCULAR; INTRAVENOUS; SUBCUTANEOUS EVERY 2 HOUR PRN
Status: DISCONTINUED | OUTPATIENT
Start: 2025-05-28 | End: 2025-05-29

## 2025-05-28 RX ORDER — OXYCODONE HYDROCHLORIDE 5 MG/1
5 TABLET ORAL EVERY 4 HOURS PRN
Status: DISCONTINUED | OUTPATIENT
Start: 2025-05-28 | End: 2025-05-29

## 2025-05-28 RX ADMIN — METOCLOPRAMIDE HYDROCHLORIDE 10 MG: 5 INJECTION INTRAMUSCULAR; INTRAVENOUS at 10:02:00

## 2025-05-28 RX ADMIN — ROCURONIUM BROMIDE 40 MG: 10 INJECTION, SOLUTION INTRAVENOUS at 08:23:00

## 2025-05-28 RX ADMIN — DEXAMETHASONE SODIUM PHOSPHATE 8 MG: 4 MG/ML VIAL (ML) INJECTION at 08:46:00

## 2025-05-28 RX ADMIN — CEFAZOLIN SODIUM 2 G: 1 INJECTION, POWDER, FOR SOLUTION INTRAMUSCULAR; INTRAVENOUS at 08:33:00

## 2025-05-28 RX ADMIN — GLYCOPYRROLATE 0.4 MG: 0.2 INJECTION, SOLUTION INTRAMUSCULAR; INTRAVENOUS at 10:05:00

## 2025-05-28 RX ADMIN — NEOSTIGMINE METHYLSULFATE 2.5 MG: 1 INJECTION INTRAVENOUS at 10:05:00

## 2025-05-28 RX ADMIN — SODIUM CHLORIDE, SODIUM LACTATE, POTASSIUM CHLORIDE, CALCIUM CHLORIDE: 600; 310; 30; 20 INJECTION, SOLUTION INTRAVENOUS at 10:07:00

## 2025-05-28 RX ADMIN — LIDOCAINE HYDROCHLORIDE 50 MG: 10 INJECTION, SOLUTION EPIDURAL; INFILTRATION; INTRACAUDAL; PERINEURAL at 08:16:00

## 2025-05-28 RX ADMIN — SODIUM CHLORIDE, SODIUM LACTATE, POTASSIUM CHLORIDE, CALCIUM CHLORIDE: 600; 310; 30; 20 INJECTION, SOLUTION INTRAVENOUS at 09:40:00

## 2025-05-28 RX ADMIN — ROCURONIUM BROMIDE 10 MG: 10 INJECTION, SOLUTION INTRAVENOUS at 08:52:00

## 2025-05-28 RX ADMIN — MIDAZOLAM HYDROCHLORIDE 2 MG: 1 INJECTION INTRAMUSCULAR; INTRAVENOUS at 08:05:00

## 2025-05-28 RX ADMIN — SODIUM CHLORIDE: 9 INJECTION, SOLUTION INTRAVENOUS at 10:29:00

## 2025-05-28 RX ADMIN — ONDANSETRON 4 MG: 2 INJECTION INTRAMUSCULAR; INTRAVENOUS at 10:02:00

## 2025-05-28 NOTE — PROGRESS NOTES
Received patient from PACU , alert and orient x 4 , very sleepy , easily awake , vitals stable , on o2 3l/nc , c pox , o2 sat 92% . Denies any chest pain . Lap x 3  steri strips and band aid on . Abdominal binder on . Rt side tl draining sero sanguinous drainage . Dressing dry and intact . Rating pain 5/10 . Provided pain medicine as needed . Family at bedside . Plan of care discussed , answered all questions . Verbalized understanding . Will continue to monitor

## 2025-05-28 NOTE — ANESTHESIA PROCEDURE NOTES
Airway  Date/Time: 5/28/2025 8:18 AM  Reason: elective    Airway not difficult    General Information and Staff   Patient location during procedure: OR  Anesthesiologist: Zi Burton MD  Performed: anesthesiologist   Performed by: Zi Burton MD  Authorized by: Zi Burton MD        Indications and Patient Condition  Indications for airway management: anesthesia  Sedation level: deep      Preoxygenated: yesPatient position: sniffing  MILS maintained throughout    Mask difficulty assessment: 2 - vent by mask + OA or adjuvant +/- NMBA    Final Airway Details    Final airway type: endotracheal airway    Successful airway: ETT  Cuffed: yes   Successful intubation technique: direct laryngoscopy  Facilitating devices/methods: cricoid pressure  Endotracheal tube insertion site: oral  Blade: Devang  Blade size: #4  ETT size (mm): 7.5    Cormack-Lehane Classification: grade IIB - view of arytenoids or posterior of glottis only  Placement verified by: capnometry   Cuff volume (mL): 6  Measured from: lips  ETT to lips (cm): 24  Number of attempts at approach: 1

## 2025-05-28 NOTE — PROGRESS NOTES
Patient is anox4, on 1L of oxygen, tolerating a clear liquid diet, NPO status to begin at 0000 w/ patient aware, up ad-princess, receiving IVF, voiding well, pain managed w/ Oxy.     Patient updated on plan of care, surgery in AM.

## 2025-05-28 NOTE — OPERATIVE REPORT
[facility]   Operative Note    Femi Paul Location: OR   [facility] [id number] MRN [id number]   Admission Date 5/26/2025 Operation Date 5/28/2025   Attending Physician Gurinder Mueller MD Operating Physician Rocio Iglesias MD     Date of procedure:    May 28, 2025    Pre-Operative Diagnosis: Acute cholecystitis, cholelithiasis, resolved choledocholithiasis status post ERCP and stone extraction    Indication for Surgery: Acute cholecystitis, cholelithiasis    Post-Operative Diagnosis: Same as above-severe acute on chronic cholecystitis with patchy areas of necrosis of the gallbladder wall    Procedure performed:  Laparoscopic cholecystectomy with ICG cholangiogram, placement Reynaldo drain    Surgeons and Role:     * Rocio Iglesias MD - Primary    Assistant:   GREGORY Sanchez    Anesthesia: General    History of Present Illness:    69-year-old gentleman who presents to the emergency department yesterday with complaint of progressively worsening abdominal pain.  The patient reports that over the past few weeks he has had transient postprandial GI symptoms.  As these symptoms are mild and resolve spontaneously, he did not seek evaluation.  This episode of severe abdominal pain started yesterday evening shortly after eating a meal of barbecued food earlier today.  The pain is primarily in the epigastrium and radiates to the bilateral upper quadrants.  The pain is described as crampy with sharp exacerbations.  The patient describes nausea but denies any emesis.  He denies any diarrhea or recent change in his bowel habits.     Past medical-surgical history-hypertension, SVT, kidney stones.  Right total hip replacement.  No prior history of abdominal surgery.  The patient is not anticoagulated     On physical examination the patient's abdomen is mildly distended, soft, tender to deep palpation in the epigastrium and right upper quadrant.  There is mild voluntary guarding.  There is no involuntary guarding, percussion  tenderness or rebound.     Workup in the ED revealed glucose of 170, initial SGOT and SGPT mildly elevated, this morning alkaline phosphatase elevated 137, SGOT 225, SGPT 287.  Leukocytosis this morning 17,000, yesterday 7.9.  UA negative.  Chest x-ray essentially negative for effusion, pneumothorax, consolidation, edema.  CT scan abdomen pelvis reveals mild extrahepatic and intrahepatic ductal dilatation.  6 mm calculus in the distal CBD.  Cholelithiasis and gallbladder distention was noted.  Moderate diffuse parenchymal atrophy of the pancreas.  Osteoarthritis of the hip and spine with a right total hip prosthesis     Treatment options were reviewed in detail with Shayan and his wife Maki at the bedside.  The patient has been evaluated by GI service and ERCP with stone extraction is scheduled for later today.  Surgical treatment options including laparoscopic cholecystectomy with cholangiogram was discussed with Shayan and his wife.  The patient did undergo ERCP and stone extraction by Dr. Barreto Yesterday.  We again discussed laparoscopic cholecystectomy with cholangiogram for acute cholecystitis, symptomatic cholelithiasis and resolved choledocholithiasis.  At this time Shayan and his wife Maki wish to proceed with surgery today as discussed.    Discussed with patient:  The potential risks and benefits of the procedure were discussed in detail with the patient including but not limited to bleeding, infection, seroma/hematoma formation, postoperative wound complications including development of a hernia, trocar injury, intra-abdominal organ injury, bile leakage, biloma formation, common bile duct injury, conversion to an open procedure, possible need for a drain, possible recurrence or persistence of symptoms despite surgery,  postoperative diarrhea, possible need for further treatment/intervention pending intra-operative/IOC findings etc, pneumonia, postoperative thromboembolic event including DVT and PE. These  and other potential intra-operative and post-operative risks were reviewed with the patient and they do not have any questions and does wish to proceed with surgery today.    Note:   A surgical assistant was essential to the proper conduct of this case particularly the aspect of dissection necessary in and around the hilum of the gallbladder, identification of critical structures such as the cystic duct, common bile duct, cystic artery and cystic plate.    Summary of Procedure:   The patient was taken to the operating room and was placed on the OR table in a supine position. After the induction of general anesthesia, perioperative antibiotics were given. The patient was then prepped and draped in usual sterile fashion. Using local anesthesia a markel-umbilical incision was made and the anterior abdominal fascia was elevated with a Kocker forcep. The Veress needle was introduced into the abdomen and the abdomen was insufflated to 15 mm mercury. The Veress needle was then exchanged for a 11 mm port.  The camera was introduced to the 11 mm port.  A 5 mm epigastric port and two 5 mm lateral ports were placed under direct vision without incidence.   The patient was placed into a reverse Trendelenburg position with the right side up.   Initial evaluation of the right upper quadrant revealed the gallbladder to be completely obscured by omental adhesions.  The omental adhesion was densely adherent to the gallbladder wall.  The omentum was gently teased away bringing the fundus into view.  The gallbladder wall was noted to be tensely distended, erythematous with patchy areas of necrosis.  The gallbladder itself was immobile therefore a percutaneous aspiration was performed.  The remainder of the gallbladder was still obscured by indurated omentum.  Therefore, this was teased away bringing Bennett's pouch into view.  Multiple other areas of patchy necrosis were noted.  Once the gallbladder was decompressed percutaneously, it  was able to be grasped at the fundus and elevated above the liver.  Continued blunt careful dissection of the omentum away from the gallbladder wall brought Bennett's pouch into view.  Once identified it was retracted laterally to expose the triangle of Calot.  ICG fluorescent imaging was then performed to confirm anatomy during dissection in the cystic duct-gallbladder junction.  Dissection was performed to define the cystic duct for sufficient length.  This dissection was performed using careful blunt dissection as well as Winslow's dissection.  The degree of inflammatory changes noted were consistent with severe acute on chronic cholecystitis.  Dissection was kept above the line of Rouviere and a critical view was obtained.  ICG fluorescent imaging was again performed to confirm anatomy prior to transection of the cystic duct.  The cystic duct was noted to be quite dilated.  An Endoloop was employed.  The cystic duct was then clipped once proximally and once distally. The cystic duct was then divided.  Due to the inflammatory changes and as the cystic duct was dilated, an Endoloop was placed across the distal cystic duct remnant to ensure closure of the duct lumen.  The cystic artery was identified and seen  to ascend onto the gallbladder wall.  This was also defined for a sufficient length and was clipped twice proximally and once distally and divided. Electrocautery was then used to dissect the gallbladder away from the liver bed.  This was made significantly more difficult by the degree of acute and chronic inflammatory changes.  Having completed this maneuver the gallbladder was placed into a Endopouch and was withdrawn through the umbilical port site. The right upper quadrant was copiously irrigated with antibiotic irrigation until the irrigant was clear. The clip and Endoloop across the cystic duct and the clips across the cystic artery were examined and found to be intact. There was no evidence of bleeding  or bile leakage from the liver bed. The remainder of the irrigation fluid, which was clear, was aspirated.  Due to the degree of inflammatory changes, the decision was made to place a Reynaldo drain.  Reynaldo drain was placed in the gallbladder fossa and exited through the lateral 5 mm port.  The Reynaldo drain was secured to the skin with nylon suture.  The accessory ports were removed under direct vision and there was no evidence of bleeding from the anterior abdominal wall. The abdomen was then deflated. The umbilical port was closed with 0 Vicryl.   All skin incisions were closed with 4-0 Vicryl in a subcuticular manner.  All sponge, instrument and needle counts were correct at the conclusion of the procedure. The patient did tolerate the procedure well.  The patient was taken to the recovery room in stable condition.  Postoperatively, intraoperative findings were reviewed with the patient's wife Maki as well as his daughter from [place] who is a physician, and his son-in-law.  They have no further questions at this time      Complications:  None    Drain: Reynaldo drain x 1    EBL:    25 cc    Pathologic specimens: The gallbladder and its contents    KASEY Iglesias MD, FACS      Please note that this report has been produced using speech recognition software and may contain errors related to that system including but not limited to errors in grammar, punctuation and spelling as well as words and phrases that possibly may have been recognized inappropriately.  If there are any questions or concerns please contact the dictating provider for clarification.  The 21st Century Cures Act makes medical notes like these available to patients in the interest of trans parency. Please be advised this is a medical document. Medical documents are intended to carry relevant information, facts as evident, and the clinical opinion of the practitioner. The medical note is intended as peer to peer communication and may appear blunt or  direct. It is written in medical language and may contain abbreviations or verbiage that are unfamiliar.  If there are any questions or concerns please contact the dictating provider for clarification.

## 2025-05-28 NOTE — ANESTHESIA POSTPROCEDURE EVALUATION
[facility]    Femi Paul Patient Status:  Inpatient   Age/Gender 69 year old male MRN [id number]   Location [facility] POST ANESTHESIA CARE UNIT Attending Gurinder Mueller MD   Hosp Day # 2 PCP Martin Anton MD       Anesthesia Post-op Note    LAPAROSCOPIC CHOLECYSTECTOMY WITH INDOCYANINE GREEN CHOLANGIOGRAPHY AND PLACEMENT OF KATE DRAIN    Procedure Summary       Date: 05/28/25 Room / Location:  MAIN OR 03 /  MAIN OR    Anesthesia Start: 0805 Anesthesia Stop: 1029    Procedure: LAPAROSCOPIC CHOLECYSTECTOMY WITH INDOCYANINE GREEN CHOLANGIOGRAPHY AND PLACEMENT OF KATE DRAIN (Abdomen) Diagnosis:       Cholecystitis with gangrene of gallbladder      (Cholecystitis with gangrene of gallbladder [[id number]])    Surgeons: Rocio Iglesias MD Anesthesiologist: Zi Burton MD    Anesthesia Type: general ASA Status: 3            Anesthesia Type: general    Vitals Value Taken Time   /71 05/28/25 11:31   Temp 98 °F (36.7 °C) 05/28/25 11:31   Pulse 87 05/28/25 11:31   Resp 13 05/28/25 11:31   SpO2 91 % 05/28/25 11:39   Vitals shown include unfiled device data.        Patient Location: PACU    Anesthesia Type: general    Airway Patency: patent    Postop Pain Control: adequate    Mental Status: mildly sedated but able to meaningfully participate in the post-anesthesia evaluation    Nausea/Vomiting: none    Cardiopulmonary/Hydration status: stable euvolemic    Complications: no apparent anesthesia related complications    Postop vital signs: stable    Dental Exam: Unchanged from Preop    Patient to be discharged from PACU when criteria met.

## 2025-05-28 NOTE — ANESTHESIA PREPROCEDURE EVALUATION
PRE-OP EVALUATION    Patient Name: Femi Paul    Admit Diagnosis: Choledocholithiasis [K80.50]    Pre-op Diagnosis: INPATIENT    LAPAROSCOPIC CHOLECYSTECTOMY WITH INDOCYANINE GREEN CHOLANGIOGRAPHY POSSIBLE OPEN    Anesthesia Procedure: LAPAROSCOPIC CHOLECYSTECTOMY WITH INDOCYANINE GREEN CHOLANGIOGRAPHY POSSIBLE OPEN (Abdomen)    Surgeons and Role:     * Rocio Iglesias MD - Primary    Pre-op vitals reviewed.  Temp: 99.2 °F (37.3 °C)  Pulse: 78  Resp: 16  BP: 122/62  SpO2: 92 %  Body mass index is 27.8 kg/m².    Current medications reviewed.  Hospital Medications:  Current Medications[1]    Outpatient Medications:   Prescriptions Prior to Admission[2]    Allergies: Patient has no known allergies.      Anesthesia Evaluation    Patient summary reviewed.    Anesthetic Complications  (+) history of anesthetic complications  History of: PONV       GI/Hepatic/Renal      (-) GERD                           Cardiovascular        Exercise tolerance: good     MET: >4    (-) obesity  (+) hypertension     (-) CAD             (+) dysrhythmias and SVT    (-) angina     (-) JOYCE         Endo/Other      (-) diabetes                            Pulmonary      (-) asthma  (-) COPD       (-) shortness of breath     (+) sleep apnea       Neuro/Psych                 (+) neuromuscular disease             Patient Active Problem List:     Vitamin D deficiency     Brachial neuritis or radiculitis NOS     Calculus of kidney     Herpes simplex virus (HSV) infection     SVT (supraventricular tachycardia) (HCC)     Essential hypertension     Diverticulosis     Internal hemorrhoids     Choledocholithiasis            Past Surgical History[3]  Social Hx on file[4]  History   Drug Use No     Available pre-op labs reviewed.  Lab Results   Component Value Date    WBC 14.1 (H) 05/28/2025    RBC 4.34 05/28/2025    HGB 12.6 (L) 05/28/2025    HCT 38.0 (L) 05/28/2025    MCV 87.6 05/28/2025    MCH 29.0 05/28/2025    MCHC 33.2 05/28/2025    RDW 13.2  2025    .0 (L) 2025     Lab Results   Component Value Date     2025    K 3.8 2025     2025    CO2 26.0 2025    BUN 16 2025    CREATSERUM 1.13 2025     (H) 2025    CA 9.1 2025     Lab Results   Component Value Date    INR 0.99 2025         Airway      Mallampati: II  Mouth opening: >3 FB  TM distance: > 6 cm  Neck ROM: full Cardiovascular      Rhythm: regular  Rate: normal     Dental             Pulmonary      Breath sounds clear to auscultation bilaterally.               Other findings              ASA: 3   Plan: general  NPO status verified and patient meets guidelines.    Post-procedure pain management plan discussed with surgeon and patient.    Comment: D/w the patient the risks and benefits of general anesthesia including sore throat, nausea, and intraoperative awareness.    Plan/risks discussed with: patient                Present on Admission:  **None**             [1]    sodium chloride 0.9% infusion   Intravenous Continuous    amLODIPine (Norvasc) tab 5 mg  5 mg Oral Daily    HYDROmorphone (Dilaudid) 1 MG/ML injection 0.1 mg  0.1 mg Intravenous Q2H PRN    Or    HYDROmorphone (Dilaudid) 1 MG/ML injection 0.2 mg  0.2 mg Intravenous Q2H PRN    Or    HYDROmorphone (Dilaudid) 1 MG/ML injection 0.4 mg  0.4 mg Intravenous Q2H PRN    [COMPLETED] indocyanine green (IC-Green) injection 5 mg  5 mg Intravenous Once    lactated ringers infusion   Intravenous Continuous    [] indomethacin (Indocin) 100 MG rectal suppository        [COMPLETED] sodium chloride 0.9 % IV bolus 1,000 mL  1,000 mL Intravenous Once    [COMPLETED] ondansetron (Zofran) 4 MG/2ML injection 4 mg  4 mg Intravenous Once    [COMPLETED] morphINE PF 4 MG/ML injection 4 mg  4 mg Intravenous Once    [COMPLETED] HYDROmorphone (Dilaudid) 1 MG/ML injection 0.5 mg  0.5 mg Intravenous Q30 Min PRN    [COMPLETED] iopamidol 76% (ISOVUE-370) injection for power  injector  100 mL Intravenous ONCE PRN    [COMPLETED] piperacillin-tazobactam (Zosyn) 4.5 g in dextrose 5% 100 mL IVPB-ADDV  4.5 g Intravenous Once    enoxaparin (Lovenox) 40 MG/0.4ML SUBQ injection 40 mg  40 mg Subcutaneous Daily    acetaminophen (Tylenol Extra Strength) tab 500 mg  500 mg Oral Q4H PRN    melatonin tab 3 mg  3 mg Oral Nightly PRN    ondansetron (Zofran) 4 MG/2ML injection 4 mg  4 mg Intravenous Q6H PRN    metoclopramide (Reglan) 5 mg/mL injection 10 mg  10 mg Intravenous Q8H PRN    benzonatate (Tessalon) cap 200 mg  200 mg Oral TID PRN    glycerin-hypromellose- (Artificial Tears) 0.2-0.2-1 % ophthalmic solution 1 drop  1 drop Both Eyes QID PRN    sodium chloride (Saline Mist) 0.65 % nasal solution 1 spray  1 spray Each Nare Q3H PRN    hydrALAzine (Apresoline) 20 mg/mL injection 10 mg  10 mg Intravenous Q6H PRN    oxyCODONE immediate release tab 5 mg  5 mg Oral Q4H PRN    Or    oxyCODONE immediate release tab 10 mg  10 mg Oral Q4H PRN   [2]   Medications Prior to Admission   Medication Sig Dispense Refill Last Dose/Taking    AMLODIPINE 5 MG Oral Tab TAKE 1 TABLET(5 MG) BY MOUTH DAILY 90 tablet 1 5/26/2025 Evening    famciclovir 500 MG Oral Tab Take 1 tablet (500 mg total) by mouth 3 (three) times daily. (Patient not taking: Reported on 5/26/2025) 42 tablet 5 Not Taking   [3]   Past Surgical History:  Procedure Laterality Date    Colonoscopy      Hip replacement surgery Right 09/12/2023    Neck/chest procedure unlisted  1988    right neck lymph    Other Right 2018    Right knee repair, not replacement.     Repair of nasal septum  1998   [4]   Social History  Socioeconomic History    Marital status:    Tobacco Use    Smoking status: Never    Smokeless tobacco: Never   Vaping Use    Vaping status: Never Used   Substance and Sexual Activity    Alcohol use: Yes     Alcohol/week: 0.0 standard drinks of alcohol     Comment: 1 drink a week    Drug use: No   Other Topics Concern    Caffeine  Concern Yes     Comment: 1 cup of coffee daily    Exercise Yes     Comment: 4-5 days a week    Seat Belt Yes      No

## 2025-05-28 NOTE — PLAN OF CARE
Problem: PAIN - ADULT  Goal: Verbalizes/displays adequate comfort level or patient's stated pain goal  Description: INTERVENTIONS:  - Encourage pt to monitor pain and request assistance  - Assess pain using appropriate pain scale  - Administer analgesics based on type and severity of pain and evaluate response  - Implement non-pharmacological measures as appropriate and evaluate response  - Consider cultural and social influences on pain and pain management  - Manage/alleviate anxiety  - Utilize distraction and/or relaxation techniques  - Monitor for opioid side effects  - Notify MD/LIP if interventions unsuccessful or patient reports new pain  - Anticipate increased pain with activity and pre-medicate as appropriate  Outcome: Progressing     Problem: SAFETY ADULT - FALL  Goal: Free from fall injury  Description: INTERVENTIONS:  - Assess pt frequently for physical needs  - Identify cognitive and physical deficits and behaviors that affect risk of falls.  - [place] fall precautions as indicated by assessment.  - Educate pt/family on patient safety including physical limitations  - Instruct pt to call for assistance with activity based on assessment  - Modify environment to reduce risk of injury  - Provide assistive devices as appropriate  - Consider OT/PT consult to assist with strengthening/mobility  - Encourage toileting schedule  Outcome: Progressing     Problem: DISCHARGE PLANNING  Goal: Discharge to home or other facility with appropriate resources  Description: INTERVENTIONS:  - Identify barriers to discharge w/pt and caregiver  - Include patient/family/discharge partner in discharge planning  - Arrange for needed discharge resources and transportation as appropriate  - Identify discharge learning needs (meds, wound care, etc)  - Arrange for interpreters to assist at discharge as needed  - Consider post-discharge preferences of patient/family/discharge partner  - Complete POLST form as appropriate  - Assess  patient's ability to be responsible for managing their own health  - Refer to Case Management Department for coordinating discharge planning if the patient needs post-hospital services based on physician/LIP order or complex needs related to functional status, cognitive ability or social support system  Outcome: Progressing     Problem: RESPIRATORY - ADULT  Goal: Achieves optimal ventilation and oxygenation  Description: INTERVENTIONS:  - Assess for changes in respiratory status  - Assess for changes in mentation and behavior  - Position to facilitate oxygenation and minimize respiratory effort  - Oxygen supplementation based on oxygen saturation or ABGs  - Provide Smoking Cessation handout, if applicable  - Encourage broncho-pulmonary hygiene including cough, deep breathe, Incentive Spirometry  - Assess the need for suctioning and perform as needed  - Assess and instruct to report SOB or any respiratory difficulty  - Respiratory Therapy support as indicated  - Manage/alleviate anxiety  - Monitor for signs/symptoms of CO2 retention  Outcome: Progressing     Problem: GENITOURINARY - ADULT  Goal: Absence of urinary retention  Description: INTERVENTIONS:  - Assess patient’s ability to void and empty bladder  - Monitor intake/output and perform bladder scan as needed  - Follow urinary retention protocol/standard of care  - Consider collaborating with pharmacy to review patient's medication profile  - Implement strategies to promote bladder emptying  Outcome: Progressing     Problem: SKIN/TISSUE INTEGRITY - ADULT  Goal: Skin integrity remains intact  Description: INTERVENTIONS  - Assess and document risk factors for pressure ulcer development  - Assess and document skin integrity  - Monitor for areas of redness and/or skin breakdown  - Initiate interventions, skin care algorithm/standards of care as needed  Outcome: Progressing

## 2025-05-28 NOTE — PROGRESS NOTES
[facility]   part of [facility]     Hospitalist Progress Note     Femi Paul Patient Status:  Inpatient    1955 MRN [id number]   Location [facility] 3NW-A Attending Gurinder Mueller MD   Hosp Day # 2 PCP Martin Anton MD     Chief Complaint: abdominal pain    Subjective:     Patient seen post procedure, he is complaining of post op pain to RUQ.  He denies having sob.  Denies n/v, no cp or cough.  No other complaints.      Objective:    Review of Systems:   A comprehensive review of systems was completed; pertinent positive and negatives stated in subjective.    Vital signs:  Temp:  [97.9 °F (36.6 °C)-99.2 °F (37.3 °C)] 98.2 °F (36.8 °C)  Pulse:  [70-95] 79  Resp:  [12-21] 21  BP: (113-171)/(57-81) 141/78  SpO2:  [90 %-94 %] 92 %    Physical Exam:    General: No acute distress  Respiratory: No wheezes, no rhonchi  Cardiovascular: S1, S2, regular rate and rhythm  Abdomen: Soft, Non-tender, non-distended, positive bowel sounds, +GRECIA drain  Neuro: No new focal deficits.   Extremities: No edema    Diagnostic Data:    Labs:  Recent Labs   Lab 258 25  0547   WBC 7.9 17.0* 14.1*   HGB 15.1 14.4 12.6*   MCV 86.0 85.2 87.6   .0 172.0 149.0*   INR 0.99  --   --        Recent Labs   Lab 258 25  0547   * 170* 125*   BUN 25* 17 16   CREATSERUM 1.14 1.05 1.13   CA 10.3 9.4 9.1   ALB 5.6* 5.0* 4.2    137 136   K 3.7 4.0 3.8    101 102   CO2 26.0 25.0 26.0   ALKPHO 117 137* 101   AST 39* 225* 58*   ALT 80* 287* 149*   BILT 0.4 0.8 1.2*   TP 8.0 7.4 6.5       Estimated Glomerular Filtration Rate: 70 mL/min/1.73m2 (result from lab).    Recent Labs   Lab 25  184   TROPHS 6       Recent Labs   Lab 25   PTP 12.9   INR 0.99                  Microbiology    No results found for this visit on 25.      Imaging: Reviewed in Epic.    Medications: Scheduled Medications[1]    Assessment & Plan:       #Choledocholithiasis  #elevated aminotransferases  -AST 39, ALT 80  -CT showing mild intra and extrahepatic biliary dilation, 6mm calculus in distal CBD, concentric bladder wall thickening  -met 1 SIRS criteria: RR 23, no signs of cholangitis at present  -pain control: tylenol, oxycodone  -cont to trend LFT's, higher today  -s/p ERCP with biliary sphincterotomy and balloon stone extraction on 5/27  -s/p cholecystectomy on 5/28  -cont IVF, pain control, anti-emetics  -CLD advance per surgery      #acute hypoxic respiratory failure  -SpO2 as low as 86% on room air  -chest xray without acute process  -suspect 2/2 narcotic administration and splinting from pain  -wean O2 as able with goal >94%  -incentive spirometry  -limit narcotics as able  -Outpatient sleep study      #HTN urgency  -BP as high as 209/92  -suspect in part 2/2 pain  -monitor on telemetry  -cont home amlodipine  -BP improved      Gurinder Mueller MD    Supplementary Documentation:     Quality:  DVT Mechanical Prophylaxis:   SCDs, Early ambuation  DVT Pharmacologic Prophylaxis   Medication    enoxaparin (Lovenox) 40 MG/0.4ML SUBQ injection 40 mg    enoxaparin (Lovenox) 40 MG/0.4ML SUBQ injection 40 mg                Code Status: Not on file  Lua: No urinary catheter in place  Lua Duration (in days):   Central line:    BENOIT:     Discharge is dependent on: surgical recovery   At this point Mr. Paul is expected to be discharge to: Home    The 21st Century Cures Act makes medical notes like these available to patients in the interest of transparency. Please be advised this is a medical document. Medical documents are intended to carry relevant information, facts as evident, and the clinical opinion of the practitioner. The medical note is intended as peer to peer communication and may appear blunt or direct. It is written in medical language and may contain abbreviations or verbiage that are unfamiliar.                         [1]    enoxaparin  40 mg  Subcutaneous Daily    acetaminophen  1,000 mg Oral Q8H NAN    piperacillin-tazobactam  3.375 g Intravenous Q8H    amLODIPine  5 mg Oral Daily    enoxaparin  40 mg Subcutaneous Daily

## 2025-05-29 VITALS
DIASTOLIC BLOOD PRESSURE: 70 MMHG | SYSTOLIC BLOOD PRESSURE: 128 MMHG | TEMPERATURE: 98 F | RESPIRATION RATE: 29 BRPM | BODY MASS INDEX: 27.77 KG/M2 | WEIGHT: 205 LBS | OXYGEN SATURATION: 97 % | HEART RATE: 71 BPM | HEIGHT: 72 IN

## 2025-05-29 LAB
ALBUMIN SERPL-MCNC: 4.1 G/DL (ref 3.2–4.8)
ALBUMIN/GLOB SERPL: 1.8 {RATIO} (ref 1–2)
ALP LIVER SERPL-CCNC: 85 U/L (ref 45–117)
ALT SERPL-CCNC: 98 U/L (ref 10–49)
ANION GAP SERPL CALC-SCNC: 7 MMOL/L (ref 0–18)
AST SERPL-CCNC: 37 U/L (ref ?–34)
BASOPHILS # BLD AUTO: 0.01 X10(3) UL (ref 0–0.2)
BASOPHILS NFR BLD AUTO: 0.1 %
BILIRUB SERPL-MCNC: 0.7 MG/DL (ref 0.2–1.1)
BUN BLD-MCNC: 14 MG/DL (ref 9–23)
CALCIUM BLD-MCNC: 8.9 MG/DL (ref 8.7–10.6)
CHLORIDE SERPL-SCNC: 105 MMOL/L (ref 98–112)
CO2 SERPL-SCNC: 29 MMOL/L (ref 21–32)
CREAT BLD-MCNC: 1.09 MG/DL (ref 0.7–1.3)
EGFRCR SERPLBLD CKD-EPI 2021: 73 ML/MIN/1.73M2 (ref 60–?)
EOSINOPHIL # BLD AUTO: 0 X10(3) UL (ref 0–0.7)
EOSINOPHIL NFR BLD AUTO: 0 %
ERYTHROCYTE [DISTWIDTH] IN BLOOD BY AUTOMATED COUNT: 13.2 %
GLOBULIN PLAS-MCNC: 2.3 G/DL (ref 2–3.5)
GLUCOSE BLD-MCNC: 163 MG/DL (ref 70–99)
HCT VFR BLD AUTO: 35.6 % (ref 39–53)
HGB BLD-MCNC: 12.1 G/DL (ref 13–17.5)
IMM GRANULOCYTES # BLD AUTO: 0.13 X10(3) UL (ref 0–1)
IMM GRANULOCYTES NFR BLD: 1 %
LYMPHOCYTES # BLD AUTO: 0.51 X10(3) UL (ref 1–4)
LYMPHOCYTES NFR BLD AUTO: 4.1 %
MCH RBC QN AUTO: 30 PG (ref 26–34)
MCHC RBC AUTO-ENTMCNC: 34 G/DL (ref 31–37)
MCV RBC AUTO: 88.3 FL (ref 80–100)
MONOCYTES # BLD AUTO: 0.68 X10(3) UL (ref 0.1–1)
MONOCYTES NFR BLD AUTO: 5.5 %
NEUTROPHILS # BLD AUTO: 11.12 X10 (3) UL (ref 1.5–7.7)
NEUTROPHILS # BLD AUTO: 11.12 X10(3) UL (ref 1.5–7.7)
NEUTROPHILS NFR BLD AUTO: 89.3 %
OSMOLALITY SERPL CALC.SUM OF ELEC: 296 MOSM/KG (ref 275–295)
PLATELET # BLD AUTO: 145 10(3)UL (ref 150–450)
PLATELETS.RETICULATED NFR BLD AUTO: 6.6 % (ref 0–7)
POTASSIUM SERPL-SCNC: 4.4 MMOL/L (ref 3.5–5.1)
PROT SERPL-MCNC: 6.4 G/DL (ref 5.7–8.2)
RBC # BLD AUTO: 4.03 X10(6)UL (ref 3.8–5.8)
SODIUM SERPL-SCNC: 141 MMOL/L (ref 136–145)
WBC # BLD AUTO: 12.5 X10(3) UL (ref 4–11)

## 2025-05-29 PROCEDURE — 99239 HOSP IP/OBS DSCHRG MGMT >30: CPT | Performed by: HOSPITALIST

## 2025-05-29 RX ORDER — FAMOTIDINE 20 MG/1
20 TABLET, FILM COATED ORAL ONCE
Status: COMPLETED | OUTPATIENT
Start: 2025-05-29 | End: 2025-05-29

## 2025-05-29 RX ORDER — OXYCODONE HYDROCHLORIDE 5 MG/1
5 TABLET ORAL EVERY 6 HOURS PRN
Qty: 10 TABLET | Refills: 0 | Status: SHIPPED | OUTPATIENT
Start: 2025-05-29

## 2025-05-29 NOTE — DISCHARGE SUMMARY
[facility]     Femi Paul Patient Status:  Inpatient    1955 MRN [id number]   Location [facility] 3NW-A Attending No att. providers found   Hosp Day # 3 PCP Martin Anton MD     Date of Admission: 2025  Date of Discharge:  2025     Discharge Disposition: Home or Self Care    Discharge Diagnosis:    #Choledocholithiasis  #elevated aminotransferases  #acute hypoxic respiratory failure  #Obstructive sleep apnea   #HTN urgency    History of Present Illness: Femi Paul is a 69 year old male with PMHx HTN/ HLD/ SVT who presented to the hospital for abdominal pain. His pain began suddenly while at a barbeque this afternoon. It was in his epigastric region with radiation to his left anterior chest rated 5/10 and \"just hurt\". It was decreased with pain medication and had no exacerbating factors He had nausea but denied any emesis, fever, chills, diarrhea, constipation.    Brief Synopsis:   Patient presented with abdominal pain.  He was found to have choledocholithiasis.  She was seen by GI and underwent an ERCP with scopic ultrasound with stone extraction.  He then underwent a cholecystectomy with general surgery.  Patient tolerated procedures well.  Patient cleared for discharge home.  Patient to follow-up with his PCP as well as GI and surgery after discharge.  All questions and concerns addressed with patient prior to discharge, he is agreeable to plan of care as stated.  He is encouraged to return to the ER symptoms Kobacher worsen.    Lace+ Score: 44  59-90 High Risk  29-58 Medium Risk  0-28   Low Risk       TCM Follow-Up Recommendation:  LACE 29-58: Moderate Risk of readmission after discharge from the hospital.      Procedures during hospitalization:   Lap cholecystectomy on   ERCP on     Consultants:  GI  Gen surgery     Discharge Medication List:     Discharge Medications        START taking these medications        Instructions  Prescription details   amoxicillin clavulanate 875-125 MG Tabs  Commonly known as: Augmentin      Take 1 tablet by mouth 2 (two) times daily for 5 days.   Stop taking on: Lavinia 3, 2025  Quantity: 10 tablet  Refills: 0     oxyCODONE 5 MG Tabs      Take 1 tablet (5 mg total) by mouth every 6 (six) hours as needed.   Quantity: 10 tablet  Refills: 0            CONTINUE taking these medications        Instructions Prescription details   amLODIPine 5 MG Tabs  Commonly known as: Norvasc      TAKE 1 TABLET(5 MG) BY MOUTH DAILY   Quantity: 90 tablet  Refills: 1            STOP taking these medications      famciclovir 500 MG Tabs  Commonly known as: Famvir                  Where to Get Your Medications        These medications were sent to [address], [facility] [[email or web address] 101 [phone], [phone]  [address] [place] [address], [address] [address]      Phone: [phone]   amoxicillin clavulanate 875-125 MG Tabs  oxyCODONE 5 MG Tabs         [facility]    Follow-up appointment:   [facility]   [facility] [address] [phone]    Schedule an appointment as soon as possible for a visit in 1 week(s)  Drain removal    Rocio Iglesias MD   [facility] [address] [phone]    Follow up  As needed    Appointments for Next 30 Days 2025 - 2025      None            Vital signs:  Temp:  [97.7 °F (36.5 °C)-98.7 °F (37.1 °C)] 98.2 °F (36.8 °C)  Pulse:  [66-75] 71  Resp:  [17-29] 29  BP: (118-143)/(57-76) 128/70  SpO2:  [92 %-100 %] 97 %    Physical Exam:    General: No acute distress   Lungs: clear to auscultation  Cardiovascular: S1, S2  Abdomen: Soft    -----------------------------------------------------------------------------------------------  PATIENT DISCHARGE INSTRUCTIONS: See electronic chart    Gurinder Mueller MD    Total time spent on discharge plannin minutes     The  Cures Act makes medical notes like these available to  patients in the interest of transparency. Please be advised this is a medical document. Medical documents are intended to carry relevant information, facts as evident, and the clinical opinion of the practitioner. The medical note is intended as peer to peer communication and may appear blunt or direct. It is written in medical language and may contain abbreviations or verbiage that are unfamiliar.

## 2025-05-29 NOTE — PROGRESS NOTES
[facility]  Progress Note    Femi Paul Patient Status:  Inpatient    1955 MRN [id number]   Location [facility] 3NW-A Attending Gurinder Mueller MD   Hosp Day # 3 PCP Martin Anton MD     Subjective:  The patient is resting in bed. He reports abdominal soreness. He is tolerating a clear liquid diet without nausea or vomiting. He reports passing flatus, denies having a bowel movement. He reports ambulating the halls.    Objective/Physical Exam:  General: Alert, orientated x3.  Cooperative.  No apparent distress.  Vital Signs:  Blood pressure 143/76, pulse 75, temperature 98.7 °F (37.1 °C), temperature source Oral, resp. rate 20, height 72\", weight 205 lb (93 kg), SpO2 92%.  HEENT: Normocephalic, atraumatic. No scleral icterus.  Abdomen:  Soft, slightly distended, appropriately tender, with no rebound or guarding.  No peritoneal signs.  Incision: Laparoscopic incisions are dry, clean, and intact with steri strips and band aids in place. No surrounding erythema or drainage. No signs of infection.  Drain: in place with 195 ml of serosanguineous output over the past 24 hours.    Labs:  CBC:    Lab Results   Component Value Date    WBC 12.5 (H) 2025    WBC 14.1 (H) 2025    WBC 17.0 (H) 2025     Lab Results   Component Value Date    HGB 12.1 (L) 2025    HGB 12.6 (L) 2025    HGB 14.4 2025      Lab Results   Component Value Date    .0 (L) 2025    .0 (L) 2025    .0 2025     Lab Results   Component Value Date    WBC 12.5 2025    HGB 12.1 2025    HCT 35.6 2025    .0 2025    CREATSERUM 1.09 2025    BUN 14 2025     2025    K 4.4 2025     2025    CO2 29.0 2025     2025    CA 8.9 2025    ALB 4.1 2025    ALKPHO 85 2025    BILT 0.7 2025    TP 6.4 2025    AST 37 2025    ALT 98 2025          Assessment/Plan:  Problem List[1]    POD 1 laparoscopic cholecystectomy with placement of derrell drain  PPD 1 ERCP with sphincterotomy, balloon sweep    Advance to soft diet.   The patient is stable for discharge home from a general surgery standpoint today.   Continue wound and drain care.   Continue IV antibiotics.  Multimodal pain control with Tylenol, Oxycodone, and dilaudid. Antiemetics as needed.  Encourage incentive spirometer.  Encourage ambulation and up to chair.  DVT prophylaxis with Lovenox  Discharge instructions discussed with the patient.   He will discharge home with a 5 day course of Augmentin.  He is to follow up [facility] in 1 week for drain removal and Dr. Iglesias as needed.    GREGORY Sanchez  5/29/2025  7:56 AM     POD 1-intraoperative findings discussed in detail with Shayan.  He has no further questions at this time.  We did discuss management of drain on outpatient basis.  He denies any nausea or vomiting and is tolerating a diet.  He is experiencing adequate pain management with oral pain medication.  The patient is passing flatus but has had no bowel movement.  Serology from today reveals decreasing SGOT and PT 37 and 98.  Total bilirubin within normal limits 0.7.  Mild residual leukocytosis 12.5, hemoglobin stable yesterday operatively 12.6 this morning 12.1.  Platelet count mildly diminished but stable 149 yesterday 145 today.  Drain is serosanguineous and minimal output.  No evidence of bile.  Surgical incisions are clean and dry.  There is no ecchymosis or cellulitis noted.  Patient's abdomen is mildly distended with markel-incisional tenderness as expected  Discharge instructions reviewed in detail with Shayan.  He has no further questions at this time.  The patient will proceed with discharge home later today.  I agree with the note above and attest to its accuracy with the following changes below after my interview and examination of the patient    The patient was  seen and examined.  Available labs and radiology is noted.    Rocio Iglesias MD FACS    Please note that this report has been produced using speech recognition software and may contain errors related to that system including but not limited to errors in grammar, punctuation and spelling as well as words and phrases that possibly may have been recognized inappropriately.  If there are any questions or concerns please contact the dictating provider for clarification.    The 21st Century Cures Act makes medical notes like these available to patients in the interest of trans parency. Please be advised this is a medical document. Medical documents are intended to carry relevant information, facts as evident, and the clinical opinion of the practitioner. The medical note is intended as peer to peer communication and may appear blunt or direct. It is written in medical language and may contain abbreviations or verbiage that are unfamiliar.   Again if there are any questions or concerns please contact the dictating provider for clarification.                  [1]   Patient Active Problem List  Diagnosis    Vitamin D deficiency    Brachial neuritis or radiculitis NOS    Calculus of kidney    Herpes simplex virus (HSV) infection    SVT (supraventricular tachycardia) (HCC)    Essential hypertension    Diverticulosis    Internal hemorrhoids    Choledocholithiasis    Calculus of gallbladder with acute cholecystitis and obstruction

## 2025-05-29 NOTE — PAYOR COMM NOTE
--------------  ADMISSION REVIEW     Payor: APURVA LYNN  Subscriber #:  [id number]  Authorization Number: [id number]    Admit date: 5/26/25  Admit time: 11:14 PM       REVIEW DOCUMENTATION:  ED Provider Notes signed by He Llanos DO at 5/26/2025 10:06 PM       Author: He Llanos DO Service: -- Author Type: Physician    Filed: 5/26/2025 10:06 PM Date of Service: 5/26/2025  7:00 PM Status: Signed     Patient Seen in: [facility] In [place]    History  Chief Complaint   Patient presents with    Chest Pain Angina     Stated Complaint: chest pain    69-year-old male, history of hypertension, kidney stones, presents with left upper quadrant epigastric pain started after eating a couple hours ago.  Intermittently nauseous.  Bili has been radiate up into the left side of his chest.  Denies any history of CAD, non-smoker.  No fall or trauma.  No vomiting.  No diarrhea constipation.     Physical Exam  ED Triage Vitals   BP 05/26/25 1830 (!) 209/92   Pulse 05/26/25 1829 75   Resp 05/26/25 1829 23   Temp 05/26/25 1830 98.8 °F (37.1 °C)   Temp src 05/26/25 1830 Temporal   SpO2 05/26/25 1830 100 %   O2 Device 05/26/25 1830 None (Room air)     Vital Signs  BP: (!) 185/88  Pulse: 79  Resp: 18  Temp: 98.8 °F (37.1 °C)  Temp src: Temporal    Oxygen Therapy  SpO2: 97 %  O2 Device: None (Room air)  O2 Flow Rate (L/min): 3 L/min    Physical Exam  Vitals and nursing note reviewed.   Constitutional:       General: He is in acute distress.   Cardiovascular:      Rate and Rhythm: Normal rate.      Heart sounds: Normal heart sounds.   Pulmonary:      Effort: Pulmonary effort is normal.      Breath sounds: Normal breath sounds.   Chest:      Chest wall: No tenderness.   Abdominal:      Palpations: Abdomen is soft.      Tenderness: There is abdominal tenderness. There is rebound.   Musculoskeletal:      Cervical back: Normal range of motion and neck supple.   Skin:     General: Skin is warm and dry.    Neurological:      General: No focal deficit present.      Mental Status: He is alert.   Psychiatric:         Mood and Affect: Mood is anxious.     Tenderness to palpation left upper quadrant in the epigastrium.  Some rebound tenderness.  No chest wall tenderness.  Also has some mild pain in the left lower quadrant.  Lungs are clear, pulses are equal.    ED Course  Labs Reviewed   COMP METABOLIC PANEL (14) - Abnormal; Notable for the following components:       Result Value    Glucose 118 (*)     BUN 25 (*)     AST 39 (*)     ALT 80 (*)     Albumin 5.6 (*)     A/G Ratio 2.3 (*)     All other components within normal limits   CBC WITH DIFFERENTIAL WITH PLATELET - Abnormal; Notable for the following components:    Immature Platelet Fraction 10.1 (*)     All other components within normal limits   URINALYSIS WITH CULTURE REFLEX - Abnormal; Notable for the following components:    Ketones Urine 15 (*)     All other components within normal limits   TROPONIN I HIGH SENSITIVITY - Normal   LIPASE - Normal   PROTHROMBIN TIME (PT) - Normal     EKG    Rate, intervals and axes as noted on EKG Report.  Rate: 74  Rhythm: Sinus Rhythm  Reading: EKG sinus rhythm 74 bpm.  Incomplete membranous block.  No ST elevations.  Compared to August 2023, no significant changes are noted.  , ,  ms    Patient placed on cardiac monitor for telemetry monitoring secondary to =abd pain. Interpretation at bedside by me is sinus rhythm.    MDM  CT ABDOMEN+PELVIS(CONTRAST ONLY)(CPT=74177)  Result Date: 5/26/2025  CONCLUSION:   1. Mild intra and extrahepatic biliary dilatation.  There is an approximately 6 mm calculus of the distal common bile duct, likely the source of obstruction.  Recommend correlation with LFTs.  2. Concentric bladder wall thickening, likely accentuated by under distention, though cystitis may be contributory.  Recommend correlation with urinalysis.       XR CHEST AP PORTABLE  (CPT=71045)  Result Date:  5/26/2025  CONCLUSION:  Heart size upper limits normal.  No pulmonary edema or focal airspace consolidation.  No significant pleural effusion or appreciable pneumothorax.  Osteoarthritis of the shoulders and spine.      External chart review demonstrates outpatient family medicine visit last fall    Wife at bedside helpful to provide information on history presenting illness    I independently interpreted the CT of the abdomen pelvis noted a distended gallbladder    Differential diagnosis includes, but not limited to, ACS, dissection, gastritis, GERD, pancreatitis, cholelithiasis, cholecystitis, choledocholithiasis, kidney stone    69-year-old male presents with upper abdominal pain, sudden onset prior to arrival.  Receiving multiple rounds of Dilaudid.  LFTs mildly elevated.  CT with choledocholithiasis.  Discussed with Dr. Hernandez from [facility], no need for MRCP at this time.  Will see in the morning.  Likely ERCP.  Would like Zosyn and surgical consultation.  Dr. Avendaño aware, will see.  Admitted to Dr. Mueller, awaiting bed assignment.  NPO.  Discussed with patient and wife.  In agreement    Admission disposition: 5/26/2025  9:19 PM  Medical Decision Making  Disposition and Plan     Clinical Impression:  1. Choledocholithiasis       Disposition:  Admit  5/26/2025  9:19 pm    He Llanos, DO on 5/26/2025 10:06 PM    History and Physical   Chief Complaint: abdominal pain    History of Present Illness:   69 year old male with PMHx HTN/ HLD/ SVT who presented to the hospital for abdominal pain. His pain began suddenly while at a barbeque this afternoon. It was in his epigastric region with radiation to his left anterior chest rated 5/10 and \"just hurt\". It was decreased with pain medication and had no exacerbating factors He had nausea but denied any emesis, fever, chills, diarrhea, constipation.    Lab 05/26/25  1840   RBC 5.08   HGB 15.1   HCT 43.7   MCV 86.0   MCH 29.7   MCHC 34.6   RDW 12.8   NEPRELIM  4.90   WBC 7.9   .0             Recent Labs   Lab 05/26/25  1840   *   BUN 25*   CREATSERUM 1.14   EGFRCR 69   CA 10.3   ALB 5.6*      K 3.7      CO2 26.0   ALKPHO 117   AST 39*   ALT 80*   BILT 0.4   TP 8.0     TROPHS 6       INR 0.99 05/26/2025       Assessment & Plan:  #Choledocholithiasis  #elevated aminotransferases  -AST 39, ALT 80  -CT showing mild intra and extrahepatic biliary dilation, 6mm calculus in distal CBD, concentric bladder wall thickening  -met 1 SIRS criteria: RR 23, no signs of cholangitis at present  -pain control: tylenol, oxycodone  -cont to trend LFT's  -GI c/s in ED  -general surgery c/s in ED     #acute hypoxic respiratory failure  -SpO2 as low as 86% on room air  -chest xray without acute process  -suspect 2/2 narcotic administration and splinting from pain  -wean O2 as able with goal >94%  -incentive spirometry  -limit narcotics as able     #HTN urgency  -BP as high as 209/92  -suspect in part 2/2 pain  -goal 25% reduction in BP over 24 hrs  -hydralazine prn  -monitor on telemetry  -cont home amlodipine    5/27/2025  General Surgery   Reason for Consultation:  Choledocholithiasis      Chief Complaint:  Abdominal pain      History of Present Illness:  69 year old male with a past medical history significant for HTN, nephrolithiasis who presents to [facility] on 5/26/2025 with concerns of diffuse abdominal pain that began earlier that evening. He reports mild nausea but denies vomiting. He reports constipation for the last 3 days. He denies fever or chills.      Upon presentation to the hospital, he was afebrile and hypertensive (209/92). Laboratory workup revealed no leukocytosis, WBC 7.9, hemoglobin 15.1, platelets 155,000.  CMP revealed no gross electrolyte abnormalities, no acute kidney injury, creatinine 1.14. LFTs mildly elevated, AST 39, ALT 80. Total bilirubin 0.4. CT of the abdomen and pelvis revealed mild intra and extrahepatic  biliary dilatation. There is an approximately 6 mm stone in the distal common bile duct.     Lab 05/26/25  1840 05/27/25  0448   RBC 5.08 4.94   HGB 15.1 14.4   HCT 43.7 42.1   MCV 86.0 85.2   MCH 29.7 29.1   MCHC 34.6 34.2   RDW 12.8 12.9   NEPRELIM 4.90 15.02*   WBC 7.9 17.0*   .0 172.0      * 170*   BUN 25* 17   CREATSERUM 1.14 1.05   CA 10.3 9.4   ALB 5.6* 5.0*    137   K 3.7 4.0    101   CO2 26.0 25.0   ALKPHO 117 137*   AST 39* 225*   ALT 80* 287*   BILT 0.4 0.8   TP 8.0 7.4     Impression:   Choledocholithiasis     PLAN:  has been evaluated by GI service and ERCP with stone extraction is scheduled for later today     laparoscopic cholecystectomy with cholangiogram   scheduled for tomorrow after ERCP and stone extraction today.     Operative Reports  1)  PREOPERATIVE DIAGNOSIS/INDICATION: Abdominal pain, elevated LFT's  POSTOPERTATIVE DIAGNOSIS: Same, choledocholithiasis  PROCEDURE PERFORMED: EUS/EGD  FINDINGS:  DUODENUM: Normal  MAJOR AMPULLA: normal  ECHO: Imaging was performed through the esophagus, stomach and duodenum. The visualized pancreatic parenchyma and the main PD at the head appear wnl, the biliary tree showed echogenic filling defects,  RECOMMENDATIONS:   Post EUS/EGD precautions, watch for bleeding, infection, perforation, adverse drug reactions and pancreatitis.  Proceed with ERCP  2)  PROCEDURE PERFORMED: ERCP with biliary sphincterotomy and balloon stone extraction     FINDINGS:  DUODENUM: Normal  MAJOR AMPULLA: normal  ERP: Not attempted  ERC: The CBD showed filling defects c/w choledocholithiasis, the CHD, the confluence of the right and left hepatic ducts and the intrahepatics appear wnl, the cystic duct and the gallbladder were not visualized.  RECOMMENDATIONS:   Post ERCP precautions, watch for bleeding, infection, perforation, adverse drug reactions and pancreatitis.  CMP, CBC in AM.  Call status report post procedure.  Cholecystectomy per general  surgery  5/28/2025  Operative Report  Pre-Operative Diagnosis: Acute cholecystitis, cholelithiasis, resolved choledocholithiasis status post ERCP and stone extraction    Indication for Surgery: Acute cholecystitis, cholelithiasis     Post-Operative Diagnosis: Same as above-severe acute on chronic cholecystitis with patchy areas of necrosis of the gallbladder wall     Procedure performed:  Laparoscopic cholecystectomy with ICG cholangiogram, placement Reynaldo drain       Hospitalist Progress Note   complaining of post op pain to RUQ. He denies having sob. Denies n/v, no cp or cough. No other complaints.     Lab 05/26/25  1840 05/27/25  0448 05/28/25  0547   WBC 7.9 17.0* 14.1*   HGB 15.1 14.4 12.6*   MCV 86.0 85.2 87.6   .0 172.0 149.0*   INR 0.99  --   --       * 170* 125*   BUN 25* 17 16   CREATSERUM 1.14 1.05 1.13   CA 10.3 9.4 9.1   ALB 5.6* 5.0* 4.2    137 136   K 3.7 4.0 3.8    101 102   CO2 26.0 25.0 26.0   ALKPHO 117 137* 101   AST 39* 225* 58*   ALT 80* 287* 149*   BILT 0.4 0.8 1.2*   TP 8.0 7.4 6.5     Assessment & Plan:  #Choledocholithiasis  #elevated aminotransferases  -AST 39, ALT 80  -CT showing mild intra and extrahepatic biliary dilation, 6mm calculus in distal CBD, concentric bladder wall thickening  -met 1 SIRS criteria: RR 23, no signs of cholangitis at present  -pain control: tylenol, oxycodone  -cont to trend LFT's, higher today  -s/p ERCP with biliary sphincterotomy and balloon stone extraction on 5/27  -s/p cholecystectomy on 5/28  -cont IVF, pain control, anti-emetics  -CLD advance per surgery      #acute hypoxic respiratory failure  -SpO2 as low as 86% on room air  -chest xray without acute process  -suspect 2/2 narcotic administration and splinting from pain  -wean O2 as able with goal >94%  -incentive spirometry  -limit narcotics as able  -Outpatient sleep study      #HTN urgency  -BP as high as 209/92  -suspect in part 2/2 pain  -monitor on telemetry  -cont home  amlodipine  -BP improved    DVT Mechanical Prophylaxis:   SCDs, Early ambuation   enoxaparin (Lovenox) 40 MG/0.4ML SUBQ injection 40 mg    enoxaparin (Lovenox) 40 MG/0.4ML SUBQ injection 40 mg      Discharge is dependent on: surgical recovery     MEDICATIONS ADMINISTERED:  acetaminophen (Tylenol Extra Strength) tab 1,000 mg       Date Action Dose Route User    5/28/2025 1424 Given 1,000 mg Oral Mariano Doran RN          bupivacaine 0.5%-EPINEPHrine 1:200,000 PF (Marcaine/Epinephrine PF) injection       Date Action Dose Route User    5/28/2025 0954 Given 30 mL Infiltration Rocio Iglesias MD          ceFAZolin (Ancef) injection       Date Action Dose Route User    5/28/2025 0833 Given 2 g Intravenous Zi Burton MD          dexamethasone (Decadron) 4 MG/ML injection       Date Action Dose Route User    5/28/2025 0846 Given 8 mg Intravenous Zi Burton MD          fentaNYL (Sublimaze) 50 mcg/mL injection       Date Action Dose Route User    5/28/2025 0949 Given 25 mcg Intravenous Zi Burton MD    5/28/2025 0936 Given 25 mcg Intravenous Zi Burton MD    5/28/2025 0855 Given 25 mcg Intravenous Zi Burton MD          glycopyrrolate (Robinul) 0.2 MG/ML injection       Date Action Dose Route User    5/28/2025 1005 Given 0.4 mg Intravenous Zi Burton MD          HYDROmorphone (Dilaudid) 1 MG/ML injection 0.4 mg       Date Action Dose Route User    5/28/2025 1044 Given 0.4 mg Intravenous Brittani Fischer RN          HYDROmorphone (Dilaudid) 1 MG/ML injection 0.6 mg       Date Action Dose Route User    5/28/2025 1039 Given 0.6 mg Intravenous Brittani Fischer RN          HYDROmorphone (Dilaudid) 1 MG/ML injection 0.4 mg       Date Action Dose Route User    5/28/2025 1253 Given 0.4 mg Intravenous Mariano Doran RN          HYDROmorphone (Dilaudid) 1 MG/ML injection       Date Action Dose Route User    5/28/2025 1039 Given 0.6 mg Intravenous Amado, Brittani R, RN          indocyanine green (IC-Green)  injection 5 mg       Date Action Dose Route User    5/28/2025 0706 Given 5 mg Intravenous Binu Merritt RN          lactated ringers infusion       Date Action Dose Route User    5/28/2025 1038 New Bag (none) Intravenous Brittani Fischer RN    5/27/2025 1951 New Bag (none) Intravenous Binu Merritt RN          lactated ringers infusion       Date Action Dose Route User    5/28/2025 0940 New Bag (none) Intravenous Zi Burton MD          lidocaine PF (Xylocaine-MPF) 1% injection       Date Action Dose Route User    5/28/2025 0816 Given 50 mg Injection Zi Burton MD          metoclopramide (Reglan) 5 mg/mL injection 10 mg       Date Action Dose Route User    5/28/2025 1002 Given 10 mg Intravenous Zi Burton MD          midazolam (Versed) 2 MG/2ML injection       Date Action Dose Route User    5/28/2025 0805 Given 2 mg Intravenous Zi Burton MD          neostigmine (Bloxiverz) 10 mg/10mL injection       Date Action Dose Route User    5/28/2025 1005 Given 2.5 mg Intravenous Zi Burton MD          ondansetron (Zofran) 4 MG/2ML injection 4 mg       Date Action Dose Route User    5/28/2025 1002 Given 4 mg Intravenous Zi Burton MD          oxyCODONE immediate release tab 5 mg       Date Action Dose Route User    5/27/2025 2209 Given 5 mg Oral Shannan Escobedo RN          piperacillin-tazobactam (Zosyn) 3.375 g in dextrose 5% 100 mL IVPB-ADDV       Date Action Dose Route User    5/28/2025 1425 New Bag 3.375 g Intravenous Mariano Doran RN          propofol (Diprivan) 10 MG/ML injection       Date Action Dose Route User    5/28/2025 0816 Given 150 mg Intravenous Zi Burotn MD          rocuronium (Zemuron) 50 mg/5mL injection       Date Action Dose Route User    5/28/2025 0852 Given 10 mg Intravenous Zi Burton MD    5/28/2025 0823 Given 40 mg Intravenous Zi Burton MD          sodium chloride 0.9% infusion       Date Action Dose Route User    5/28/2025 0805 Continued by Anesthesia  (none) Intravenous Zi Burton MD          sodium chloride 0.9% infusion       Date Action Dose Route User    5/28/2025 1252 New Bag (none) Intravenous Mariano Doran, RN          succinylcholine (Anectine) 20 MG/ML injection       Date Action Dose Route User    5/28/2025 0816 Given 100 mg Intravenous Zi Burton MD       Vitals       Date/Time Temp Pulse Resp BP SpO2 Weight O2 Device O2 Flow Rate (L/min) [facility]    05/28/25 1708 98.2 °F (36.8 °C) -- 17 118/57 100 % -- None (Room air) -- PG    05/28/25 1230 98.2 °F (36.8 °C) 79 21 141/78 92 % -- Nasal cannula 2 L/min JK    05/28/25 1131 98 °F (36.7 °C) 93 15 138/71 91 % -- Nasal cannula 3 L/min     05/28/25 1116 -- 95 15 134/76 90 % -- Nasal cannula 3 L/min     05/28/25 1101 -- 91 12 149/77 90 % -- -- -- DM    05/28/25 1054 -- 92 16 -- 90 % -- Nasal cannula 3 L/min     05/28/25 1046 -- 95 17 151/73 90 % -- Nasal cannula 3 L/min     05/28/25 1039 -- 91 20 -- 92 % -- -- -- DM    05/28/25 1031 -- 92 16 166/74 92 % -- -- -- DM    05/28/25 1026 -- 92 19 171/75 90 % -- -- -- DM    05/28/25 1021 -- 93 19 167/81 90 % -- -- -- DM    05/28/25 1016 97.9 °F (36.6 °C) 88 19 169/79 90 % -- None (Room air) -- DM    05/28/25 0741 98.1 °F (36.7 °C) -- 16 126/62 92 % -- None (Room air) -- PG    05/28/25 0336 99.2 °F (37.3 °C) 78 16 122/62 92 % -- Nasal cannula 1 L/min DI    05/28/25 0000 98.5 °F (36.9 °C) 82 15 119/59 91 % -- None (Room air) 0 L/min DI    05/27/25 2211 -- 76 -- -- 93 % -- -- -- DI    05/27/25 2001 97.9 °F (36.6 °C) 72 15 116/57 93 % -- None (Room air) 0 L/min DI    05/27/25 1715 98.2 °F (36.8 °C) 70 14 113/58 94 % -- None (Room air) -- AK    05/27/25 1245 98.2 °F (36.8 °C) 79 20 138/63 90 % -- -- -- JK    05/27/25 1215 -- 81 -- 157/72 89 % -- -- -- EP    05/27/25 1210 -- 78 -- 156/77 93 % -- -- -- EP    05/27/25 1205 -- 79 -- 155/71 91 % -- -- -- EP    05/27/25 1200 -- 84 -- 156/73 94 % -- -- -- EP    05/27/25 1155 -- 81 -- 151/73 92 % -- -- -- EP     05/27/25 1147 -- 87 22 158/72 93 % -- -- -- EP    05/27/25 1135 -- 84 20 164/73 91 % -- None (Room air) -- EP    05/27/25 0930 -- 87 -- 160/74 94 % -- -- -- JK    05/27/25 0715 98.4 °F (36.9 °C) 90 14 175/79 94 % -- Nasal cannula 2 L/min AK    05/27/25 0700 -- 89 -- 168/80 94 % -- -- -- AK    05/27/25 0400 98.2 °F (36.8 °C) 90 18 178/82 93 % -- Nasal cannula 2 L/min LQ    05/27/25 0009 -- -- -- -- -- 205 lb (93 kg) -- --      05/26/25 2345 97.6 °F (36.4 °C) 82 19 166/81 Abnormal  97 % -- Nasal cannula 1.5 L/min DI   05/26/25 2240 -- 82 18 177/89 Abnormal  95 % -- Nasal cannula 3 L/min    05/26/25 2158 -- 79 18 185/88 Abnormal  97 % -- None (Room air) --    05/26/25 2021 -- 76 20 185/89 Abnormal  95 % -- Nasal cannula 3 L/min    05/26/25 1944 -- 74 19 180/91 Abnormal  86 % Abnormal  -- None (Room air) --    05/26/25 1847 -- -- -- -- -- -- None (Room air) -- KR   05/26/25 1843 -- 75 22 192/88 Abnormal  100 % -- None (Room air) -- KR   05/26/25 1830 98.8 °F (37.1 °C) -- -- 209/92 Abnormal  100 % 205 lb (93 kg) None (Room air) -- KR   05/26/25 1829 -- 75 23 -- -- -- -- -- KR       FOR REVIEW/APPROVAL OF INPT ADMISSION

## 2025-05-29 NOTE — PROGRESS NOTES
NURSING DISCHARGE NOTE    Discharged Home via Wheelchair.  Accompanied by Spouse  Belongings Taken by patient/family.    Patient given discharge instructions. Given prescription medications from [facility]. Given  Instructed to follow-up with surgery in 1 week for drain removal. Patient given verbal instruction and demonstration for drain care. Given drainage log to take to follow-up appointment. Given drain supplies to take home. IV removed and intact.

## 2025-05-29 NOTE — PROGRESS NOTES
Neuro: Alert and oriented x4.   Vital signs stable  Respiratory: On 3L O2 @ 93%. Continuous pulse oximeter.   Cardiac: Tele-NSR  GI: Abdomen soft, nondistended. Denies nausea. Denies passing gas or belching.   : Voids.  Integumentary: 3 Lap sites with steri strips and bandaids.  Drains: RGECIA x1 on the right  Pain: controlled with PRN pain medications  Activity: Up with standby assist.  Diet: Tolerating clear liquid diet.  IV Fluids: infusing per order. 0.9@100  All appropriate safety measures in place. All questions and concerns addressed.    Skin check completed with Yasmine PCT. No skin breakdown noted.

## 2025-05-30 ENCOUNTER — PATIENT OUTREACH (OUTPATIENT)
Dept: CASE MANAGEMENT | Age: 70
End: 2025-05-30

## 2025-05-30 NOTE — PROGRESS NOTES
ISABELLA request  Hospital follow-up appt / Discharged 5/29 [facility]      PCP Request :  Martin Anton MD  [address]  [address] [address]  [phone]          Attempt #1:  Left message on voicemail for patient to call transitions specialist back to schedule follow up appointments. Provided Transitions specialist scheduling phone number [phone].

## 2025-05-30 NOTE — PAYOR COMM NOTE
--------------  DISCHARGE REVIEW    Payor: APURVA LYNN  Subscriber #:  [id number]  Authorization Number: [id number]    Admit date: 25  Admit time:  11:14 PM  Discharge Date: 2025  2:36 PM     Admitting Physician: Gurinder Mueller MD  Attending Physician:  No att. providers found  Primary Care Physician: Martin Anton MD          Discharge Summary Notes        Discharge Summary signed by Gurinder Mueller MD at 2025  3:16 PM       Author: Gurinder Mueller MD Specialty: HOSPITALIST Author Type: Physician    Filed: 2025  3:16 PM Date of Service: 2025  3:12 PM Status: Signed    : Gurinder Mueller MD (Physician)           [facility]  DISCHARGE SUMMARY     Femi Paul Patient Status:  Inpatient    1955 MRN [id number]   Location [facility] 3NW-A Attending No att. providers found   Hosp Day # 3 PCP Martin Anton MD     Date of Admission: 2025  Date of Discharge:  2025     Discharge Disposition: Home or Self Care    Discharge Diagnosis:    #Choledocholithiasis  #elevated aminotransferases  #acute hypoxic respiratory failure  #Obstructive sleep apnea   #HTN urgency    History of Present Illness: Femi Paul is a 69 year old male with PMHx HTN/ HLD/ SVT who presented to the hospital for abdominal pain. His pain began suddenly while at a barbeque this afternoon. It was in his epigastric region with radiation to his left anterior chest rated 5/10 and \"just hurt\". It was decreased with pain medication and had no exacerbating factors He had nausea but denied any emesis, fever, chills, diarrhea, constipation.    Brief Synopsis:   Patient presented with abdominal pain.  He was found to have choledocholithiasis.  She was seen by GI and underwent an ERCP with scopic ultrasound with stone extraction.  He then underwent a cholecystectomy with general surgery.  Patient tolerated procedures well.  Patient cleared for discharge home.  Patient to follow-up with his PCP  as well as GI and surgery after discharge.  All questions and concerns addressed with patient prior to discharge, he is agreeable to plan of care as stated.  He is encouraged to return to the ER symptoms Kobacher worsen.    Lace+ Score: 44  59-90 High Risk  29-58 Medium Risk  0-28   Low Risk       TCM Follow-Up Recommendation:  LACE 29-58: Moderate Risk of readmission after discharge from the hospital.      Procedures during hospitalization:   Lap cholecystectomy on 5/28  ERCP on 5/27    Consultants:  GI  Gen surgery     Discharge Medication List:     Discharge Medications        START taking these medications        Instructions Prescription details   amoxicillin clavulanate 875-125 MG Tabs  Commonly known as: Augmentin      Take 1 tablet by mouth 2 (two) times daily for 5 days.   Stop taking on: Lavinia 3, 2025  Quantity: 10 tablet  Refills: 0     oxyCODONE 5 MG Tabs      Take 1 tablet (5 mg total) by mouth every 6 (six) hours as needed.   Quantity: 10 tablet  Refills: 0            CONTINUE taking these medications        Instructions Prescription details   amLODIPine 5 MG Tabs  Commonly known as: Norvasc      TAKE 1 TABLET(5 MG) BY MOUTH DAILY   Quantity: 90 tablet  Refills: 1            STOP taking these medications      famciclovir 500 MG Tabs  Commonly known as: Famvir                  Where to Get Your Medications        These medications were sent to [place] [address] [phone], [phone]  [address] [place] [address]      Phone: [phone]   amoxicillin clavulanate 875-125 MG Tabs  oxyCODONE 5 MG Tabs         [place]    Follow-up appointment:   [facility]  [address] [place] [id number] [phone]    Schedule an appointment as soon as possible for a visit in 1 week(s)  Drain removal    Rocio Iglesias MD  [address] [facility] [address] [phone]    Follow up  As needed    Appointments for Next 30  Days 2025 - 2025      None            Vital signs:  Temp:  [97.7 °F (36.5 °C)-98.7 °F (37.1 °C)] 98.2 °F (36.8 °C)  Pulse:  [66-75] 71  Resp:  [17-29] 29  BP: (118-143)/(57-76) 128/70  SpO2:  [92 %-100 %] 97 %    Physical Exam:    General: No acute distress   Lungs: clear to auscultation  Cardiovascular: S1, S2  Abdomen: Soft    -----------------------------------------------------------------------------------------------  PATIENT DISCHARGE INSTRUCTIONS: See electronic chart    Gurinder Mueller MD    Total time spent on discharge plannin minutes     The  Cent[employer] Cures Act makes medical notes like these available to patients in the interest of transparency. Please be advised this is a medical document. Medical documents are intended to carry relevant information, facts as evident, and the clinical opinion of the practitioner. The medical note is intended as peer to peer communication and may appear blunt or direct. It is written in medical language and may contain abbreviations or verbiage that are unfamiliar.       Electronically signed by Gurinder Mueller MD on 2025  3:16 PM         REVIEWER COMMENTS

## 2025-05-30 NOTE — PROGRESS NOTES
Returning pt call :    ISABELLA request  Hospital follow-up appt / Discharged 5/[facility]        General Surgery Request :  [facility]  [address]  [facility] [phone]  Schedule an appointment as soon as possible for a visit in 1 week(s)  Drain removal  OFFICE CLOSED WILL CONTACT THIS GROUP FIRST THING MONDAY MORNING (Monday 6/02/2025)        PCP Request :  Martin Anton MD  [address]  [address]  [facility] [id number] [phone]  DECLINED - pt prefers to follow-up with general surgery to have his drains removed.        GENERAL SURGERY OFFICE CLOSED ; WILL CALL THEM FIRST THING ON MONDAY MORN (Monday 6/02)

## 2025-05-31 ENCOUNTER — HOSPITAL ENCOUNTER (EMERGENCY)
Facility: HOSPITAL | Age: 70
Discharge: HOME | End: 2025-05-31
Attending: EMERGENCY MEDICINE
Payer: COMMERCIAL

## 2025-05-31 ENCOUNTER — APPOINTMENT (OUTPATIENT)
Dept: ULTRASOUND IMAGING | Facility: HOSPITAL | Age: 70
End: 2025-05-31
Attending: EMERGENCY MEDICINE
Payer: COMMERCIAL

## 2025-05-31 ENCOUNTER — TELEPHONE (OUTPATIENT)
Dept: FAMILY MEDICINE CLINIC | Facility: CLINIC | Age: 70
End: 2025-05-31

## 2025-05-31 VITALS
SYSTOLIC BLOOD PRESSURE: 168 MMHG | HEIGHT: 72 IN | RESPIRATION RATE: 16 BRPM | HEART RATE: 75 BPM | TEMPERATURE: 98 F | WEIGHT: 205 LBS | BODY MASS INDEX: 27.77 KG/M2 | DIASTOLIC BLOOD PRESSURE: 79 MMHG | OXYGEN SATURATION: 94 %

## 2025-05-31 DIAGNOSIS — G89.18 POSTOPERATIVE PAIN: Primary | ICD-10-CM

## 2025-05-31 LAB
ALBUMIN SERPL-MCNC: 4.6 G/DL (ref 3.2–4.8)
ALBUMIN/GLOB SERPL: 1.8 {RATIO} (ref 1–2)
ALP LIVER SERPL-CCNC: 140 U/L (ref 45–117)
ALT SERPL-CCNC: 65 U/L (ref 10–49)
ANION GAP SERPL CALC-SCNC: 8 MMOL/L (ref 0–18)
AST SERPL-CCNC: 30 U/L (ref ?–34)
BASOPHILS # BLD AUTO: 0.04 X10(3) UL (ref 0–0.2)
BASOPHILS NFR BLD AUTO: 0.4 %
BILIRUB SERPL-MCNC: 0.6 MG/DL (ref 0.2–1.1)
BUN BLD-MCNC: 21 MG/DL (ref 9–23)
CALCIUM BLD-MCNC: 9.3 MG/DL (ref 8.7–10.6)
CHLORIDE SERPL-SCNC: 102 MMOL/L (ref 98–112)
CO2 SERPL-SCNC: 30 MMOL/L (ref 21–32)
CREAT BLD-MCNC: 1.15 MG/DL (ref 0.7–1.3)
EGFRCR SERPLBLD CKD-EPI 2021: 68 ML/MIN/1.73M2 (ref 60–?)
EOSINOPHIL # BLD AUTO: 0.18 X10(3) UL (ref 0–0.7)
EOSINOPHIL NFR BLD AUTO: 1.8 %
ERYTHROCYTE [DISTWIDTH] IN BLOOD BY AUTOMATED COUNT: 13.3 %
GLOBULIN PLAS-MCNC: 2.6 G/DL (ref 2–3.5)
GLUCOSE BLD-MCNC: 125 MG/DL (ref 70–99)
HCT VFR BLD AUTO: 41.6 % (ref 39–53)
HGB BLD-MCNC: 13.6 G/DL (ref 13–17.5)
IMM GRANULOCYTES # BLD AUTO: 0.12 X10(3) UL (ref 0–1)
IMM GRANULOCYTES NFR BLD: 1.2 %
LIPASE SERPL-CCNC: 35 U/L (ref 12–53)
LYMPHOCYTES # BLD AUTO: 1.12 X10(3) UL (ref 1–4)
LYMPHOCYTES NFR BLD AUTO: 11 %
MCH RBC QN AUTO: 28.6 PG (ref 26–34)
MCHC RBC AUTO-ENTMCNC: 32.7 G/DL (ref 31–37)
MCV RBC AUTO: 87.6 FL (ref 80–100)
MONOCYTES # BLD AUTO: 1.08 X10(3) UL (ref 0.1–1)
MONOCYTES NFR BLD AUTO: 10.6 %
NEUTROPHILS # BLD AUTO: 7.63 X10 (3) UL (ref 1.5–7.7)
NEUTROPHILS # BLD AUTO: 7.63 X10(3) UL (ref 1.5–7.7)
NEUTROPHILS NFR BLD AUTO: 75 %
OSMOLALITY SERPL CALC.SUM OF ELEC: 294 MOSM/KG (ref 275–295)
PLATELET # BLD AUTO: 213 10(3)UL (ref 150–450)
POTASSIUM SERPL-SCNC: 3.4 MMOL/L (ref 3.5–5.1)
PROT SERPL-MCNC: 7.2 G/DL (ref 5.7–8.2)
RBC # BLD AUTO: 4.75 X10(6)UL (ref 3.8–5.8)
SODIUM SERPL-SCNC: 140 MMOL/L (ref 136–145)
WBC # BLD AUTO: 10.2 X10(3) UL (ref 4–11)

## 2025-05-31 PROCEDURE — 80053 COMPREHEN METABOLIC PANEL: CPT

## 2025-05-31 PROCEDURE — 80053 COMPREHEN METABOLIC PANEL: CPT | Performed by: EMERGENCY MEDICINE

## 2025-05-31 PROCEDURE — 99284 EMERGENCY DEPT VISIT MOD MDM: CPT

## 2025-05-31 PROCEDURE — 99285 EMERGENCY DEPT VISIT HI MDM: CPT

## 2025-05-31 PROCEDURE — 76705 ECHO EXAM OF ABDOMEN: CPT | Performed by: EMERGENCY MEDICINE

## 2025-05-31 PROCEDURE — 83690 ASSAY OF LIPASE: CPT | Performed by: EMERGENCY MEDICINE

## 2025-05-31 PROCEDURE — 85025 COMPLETE CBC W/AUTO DIFF WBC: CPT | Performed by: EMERGENCY MEDICINE

## 2025-05-31 PROCEDURE — 85025 COMPLETE CBC W/AUTO DIFF WBC: CPT

## 2025-05-31 PROCEDURE — 36415 COLL VENOUS BLD VENIPUNCTURE: CPT

## 2025-05-31 RX ORDER — ACETAMINOPHEN 500 MG
1000 TABLET ORAL ONCE
Status: COMPLETED | OUTPATIENT
Start: 2025-05-31 | End: 2025-05-31

## 2025-05-31 NOTE — TELEPHONE ENCOUNTER
Pts wife is calling because pt had his gallbladder removed on Wednesday and now the drain has changed in color

## 2025-05-31 NOTE — ED PROVIDER NOTES
Patient Seen in: [facility] Emergency Department        History  Chief Complaint   Patient presents with    Abdomen/Flank Pain    Postop/Procedure Problem     Stated Complaint: post op abd pain    Subjective:   HPI            69-year-old male comes to the hospital with a complaint of having difficulty with change to the drainage from his GRECIA drain status post cholecystectomy.  He says before it was much more bloody and now it seems yellow.  He does have some mild pain at the insertion site.  Is no other significant pain.  He denies any fevers or chills but is no nausea or vomiting.  He was told to come to rule out infection.  He has no other complaints.      Objective:     Past Medical History:    Calculus of kidney    Essential hypertension    Essential hypertension    High blood pressure    History of COVID-19    Pt cant recall exact month. Had cold like S/S for a couple of days.    Osteoarthritis    Paroxysmal supraventricular tachycardia (HCC)    PONV (postoperative nausea and vomiting)    Visual impairment    glasses              Past Surgical History:   Procedure Laterality Date    Colonoscopy      Hip replacement surgery Right 09/12/2023    Neck/chest procedure unlisted  1988    right neck lymph    Other Right 2018    Right knee repair, not replacement.     Repair of nasal septum  1998                Social History     Socioeconomic History    Marital status:    Tobacco Use    Smoking status: Never    Smokeless tobacco: Never   Vaping Use    Vaping status: Never Used   Substance and Sexual Activity    Alcohol use: Yes     Alcohol/week: 0.0 standard drinks of alcohol     Comment: 1 drink a week    Drug use: No   Other Topics Concern    Caffeine Concern Yes     Comment: 1 cup of coffee daily    Exercise Yes     Comment: 4-5 days a week    Seat Belt Yes     Social Drivers of Health     Food Insecurity: No Food Insecurity (5/26/2025)    NCSS - Food Insecurity     Worried About Running Out of Food in  the Last Year: No     Ran Out of Food in the Last Year: No   Transportation Needs: No Transportation Needs (5/26/2025)    NCSS - Transportation     Lack of Transportation: No   Housing Stability: Not At Risk (5/26/2025)    NCSS - Housing/Utilities     Has Housing: Yes     Worried About Losing Housing: No     Unable to Get Utilities: No                                Physical Exam    ED Triage Vitals [05/31/25 1129]   /70   Pulse 84   Resp 18   Temp 97.9 °F (36.6 °C)   Temp src Temporal   SpO2 94 %   O2 Device None (Room air)       Current Vitals:   Vital Signs  BP: (!) 168/79  Pulse: 75  Resp: 16  Temp: 97.9 °F (36.6 °C)  Temp src: Temporal  MAP (mmHg): 94    Oxygen Therapy  SpO2: 94 %  O2 Device: None (Room air)            Physical Exam  HEENT; NCAT, EOMI, throat clear, neck supple, no LAD, no JVD  Heart S1S2 RRR  lungs: CTAB  abd: Soft mild tenderness noted in the right upper quadrant.  GRECIA insertion site has some mild erythema just around the insertion itself, GRECIA drain has clear yellow fluid noted without cloudiness, ND,  NABS without rebound or guarding  Ext no C/C/E        ED Course  Labs Reviewed   COMP METABOLIC PANEL (14) - Abnormal; Notable for the following components:       Result Value    Glucose 125 (*)     Potassium 3.4 (*)     ALT 65 (*)     Alkaline Phosphatase 140 (*)     All other components within normal limits   CBC WITH DIFFERENTIAL WITH PLATELET - Abnormal; Notable for the following components:    Monocyte Absolute 1.08 (*)     All other components within normal limits   LIPASE - Normal   RAINBOW DRAW LAVENDER   RAINBOW DRAW LIGHT GREEN       ED Course as of 05/31/25 1624  ------------------------------------------------------------  Time: 05/31 1557  Comment: Of the patient's lipase was normal.  The white count was 10.2 thousand.  The CMP had an alk phos of 140 which is a bit elevated.  I spoke with surgery at this time the patient per surgery is gone to ultrasound.  I reviewed the  ultrasound personally and reviewed the radiology report.     US ABDOMEN LIMITED (CPT=76705)  Result Date: 5/31/2025  PROCEDURE:  US ABDOMEN LIMITED (CPT=76705)  COMPARISON:  [facility], CT, CT ABDOMEN+PELVIS(CONTRAST ONLY)(CPT=74177), 5/26/2025, 8:06 PM.  INDICATIONS:  ruq  PATIENT STATED HISTORY: (As transcribed by Technologist)     FINDINGS:   There is free fluid within the gallbladder fossa and Morison's pouch.  A drain is partially visualized at the gallbladder fossa.  There may be a trace right pleural effusion.  Unremarkable sonographic appearance of the liver.  Common bile duct measures 5  mm.  Pancreas not well assessed. If the patient's symptoms persist or worsen, consider further assessment with CT.            CONCLUSION:  See above.   LOCATION:  [facility]   Dictated by (CST): Stromberg, LeRoy, MD on 5/31/2025 at 4:04 PM     Finalized by (CST): Stromberg, LeRoy, MD on 5/31/2025 at 4:07 PM       XR ENDO BILE DUCT (CPT=74328)  Result Date: 5/27/2025  PROCEDURE:  XR ENDO BILE DUCT (CPT=74328)  COMPARISON:  None.  INDICATIONS:  ercp  FLUOROSCOPY IMAGES OBTAINED:  3 FLUOROSCOPY TIME:  53s TECHNOLOGIST TIME:  60 RADIATION DOSE (AIR KERMA PRODUCT):  12.99mGy  FINDINGS:  Intraoperative fluoroscopic guidance provided for ERCP.  Endoscope noted in the right upper abdomen.  A wire is seen manipulating the common bile duct.  There is partial opacification of the biliary tree.  A balloon/catheter is seen manipulating the common bile duct.  Please see the operative report for further details.            CONCLUSION:  Intraoperative fluoroscopic guidance for ERCP as above.   LOCATION:  [id number]   Dictated by (CST): Jimmy Quinones MD on 5/27/2025 at 12:05 PM     Finalized by (CST): Jimmy Quinones MD on 5/27/2025 at 12:05 PM       CT ABDOMEN+PELVIS(CONTRAST ONLY)(CPT=74177)  Result Date: 5/26/2025  PROCEDURE:  CT ABDOMEN+PELVIS (CONTRAST ONLY) (CPT=74177)  COMPARISON:  None.  INDICATIONS:  LUQ, epigastric pain  TECHNIQUE:  CT  scanning was performed from the dome of the diaphragm to the pubic symphysis with non-ionic intravenous contrast material. Post contrast coronal MPR imaging was performed.  Dose reduction techniques were used. Dose information is transmitted to the ACR (American College of Radiology) NRDR (National Radiology Data Registry) which includes the Dose Index Registry.  PATIENT STATED HISTORY:(As transcribed by Technologist)  The patient states he had a sudden onset of epigastric pain x this PM.   CONTRAST USED:  100cc of Isovue 370  FINDINGS:  LIVER:  No enlargement, atrophy, abnormal density, or significant focal lesion.  BILIARY:  Cholelithiasis and gallbladder distention.  The intra and extrahepatic bile ducts are dilated, the common bile duct measuring up to 9 mm.  Suspected intraductal calculus of the distal common bile duct measures 6 mm (series 601, image 66), likely the source of biliary obstruction.  PANCREAS:  Moderate, diffuse parenchymal atrophy. SPLEEN:  No enlargement or focal lesion.  KIDNEYS:  No mass, obstruction, or calcification.  Left mid polar cortical cyst measures 2.1 cm. ADRENALS:  No mass or enlargement.  AORTA/VASCULAR:  No aneurysm or dissection.  RETROPERITONEUM:  No mass or adenopathy.  BOWEL/MESENTERY:  The stomach is moderately distended with ingested material.  No evidence of bowel inflammation or obstruction.  Normal appendix.  Sigmoid diverticulosis. ABDOMINAL WALL:  No mass or hernia.  URINARY BLADDER:  Bladder wall is accentuated by under distention, though cystitis may be contributory. PELVIC NODES:  No adenopathy.  PELVIC ORGANS:  Prostatomegaly. BONES:  Osteoarthritis of the hips and spine with right total hip prosthesis noted. LUNG BASES:  No visible pulmonary or pleural disease.  OTHER:  Negative.             CONCLUSION:   1. Mild intra and extrahepatic biliary dilatation.  There is an approximately 6 mm calculus of the distal common bile duct, likely the source of obstruction.   Recommend correlation with LFTs.  2. Concentric bladder wall thickening, likely accentuated by under distention, though cystitis may be contributory.  Recommend correlation with urinalysis.   LOCATION:  [facility]   Dictated by (CST): Aurelio Lyons MD on 5/26/2025 at 8:49 PM     Finalized by (CST): Aurelio Lyons MD on 5/26/2025 at 8:56 PM       XR CHEST AP PORTABLE  (CPT=71045)  Result Date: 5/26/2025  PROCEDURE:  XR CHEST AP PORTABLE  (CPT=71045)  TECHNIQUE:  AP chest radiograph was obtained.  COMPARISON:  [facility] , XR, CHEST AP (1 VW), 3/26/2000, 7:25 PM.  INDICATIONS:  chest pain  PATIENT STATED HISTORY: (As transcribed by Technologist)  Patient has middle and left sided chest pains that radiates into his abdomen region that started today.    FINDINGS:             CONCLUSION:  Heart size upper limits normal.  No pulmonary edema or focal airspace consolidation.  No significant pleural effusion or appreciable pneumothorax.  Osteoarthritis of the shoulders and spine.   LOCATION:  [facility]      Dictated by (CST): Aurelio Lyons MD on 5/26/2025 at 7:49 PM     Finalized by (CST): Aurelio Lyons MD on 5/26/2025 at 7:49 PM                         MDM     Differential diagnosis included infection, postop pain, biliary leak but limited such.  Patient's drain is in place and no evidence of any other abnormalities noted at this time including infection.  I spoke with Dr. Espinoza  from surgery who reviewed the imaging as well and the patient cleared for discharge and follow-up.    Patient was screened and evaluated during this visit.   As a treating physician attending to the patient, I determined, within reasonable clinical confidence and prior to discharge, that an emergency medical condition was not or was no longer present.  There was no indication for further evaluation, treatment or admission on an emergency basis.       The usual and customary discharge instuctions were discussed given the patient's ER course.  We discussed signs and  symptoms that should prompt the patient's immediate return to the emergency department.   Reasonable over the counter and prescription treatment options and Physician follow up plan was discussed.       The patient is discharged in good condition.     This note was prepared using Dragon Medical voice recognition dictation software.  As a result errors may occur.  When identified to these areas have been corrected.  While every attempt is made to correct errors during dictation discrepancies may still exist.  Please contact if there are any errors.    Medical Decision Making      Disposition and Plan     Clinical Impression:  1. Postoperative pain         Disposition:  Discharge  5/31/2025  4:24 pm    Follow-up:  Rocio Iglesias MD  [address] [facility] [address]  [phone]    Schedule an appointment as soon as possible for a visit            Medications Prescribed:  Current Discharge Medication List                Supplementary Documentation:

## 2025-05-31 NOTE — TELEPHONE ENCOUNTER
Drainage was originally redish and told that was normal it has been steadily decreasing in amount since surgery on Wed. Today that changed and it is now more clear but has strong greenish tint. Asked pt to reach out to surgery. They had put in a call and waiting on call back from oncall for some time now. Spoke w/weekend provider and asked pt to go to ER for evaluation as drainage should not have increased and changed color. Wife and pt verbalized agreement and understanding and will go to ER.

## 2025-05-31 NOTE — ED INITIAL ASSESSMENT (HPI)
Pt to ED wit c/o right sided abdominal pain.   Pt had gallbladder removed on Wednesday, drainage to GRECIA drain was red now it is yellow and was told to come to ED to r/o infection.  Denies fevers.

## 2025-06-02 NOTE — PROGRESS NOTES
ISABELLA request  Hospital follow-up appt / Discharged 5/[facility]    ER follow-up Discharged 5/31 [facility]        General Surgery Request :  [facility]  [address]  [place] [address]  [phone]  Schedule an appointment as soon as possible for a visit in 1 week(s)  Drain removal  Wednesday 6/04 @9:30am w/ Gen Surg [facility]      PCP Request (ER follow-up appt):  Martin Anton MD  [address] [place] [address]   [address]  [place] [address]  [phone]  Wednesday 6/04 @4pm w/ Dr. Martin Anton      Spoke with pt to schedule his post-op appt w/ Gen Surgery.  Pt was then also seen in the ER & discharged on SAturday 5/31.  A ER follow-up appt w/ Dr. Anton, scheduled and confirmed.      No further assistance needed      Closing encounter

## 2025-06-04 ENCOUNTER — OFFICE VISIT (OUTPATIENT)
Dept: FAMILY MEDICINE CLINIC | Facility: CLINIC | Age: 70
End: 2025-06-04
Payer: COMMERCIAL

## 2025-06-04 ENCOUNTER — OFFICE VISIT (OUTPATIENT)
Facility: LOCATION | Age: 70
End: 2025-06-04
Payer: COMMERCIAL

## 2025-06-04 VITALS
SYSTOLIC BLOOD PRESSURE: 134 MMHG | DIASTOLIC BLOOD PRESSURE: 72 MMHG | HEART RATE: 65 BPM | TEMPERATURE: 97 F | RESPIRATION RATE: 20 BRPM | OXYGEN SATURATION: 95 %

## 2025-06-04 VITALS
SYSTOLIC BLOOD PRESSURE: 130 MMHG | WEIGHT: 206 LBS | HEIGHT: 72 IN | RESPIRATION RATE: 16 BRPM | OXYGEN SATURATION: 95 % | DIASTOLIC BLOOD PRESSURE: 70 MMHG | HEART RATE: 61 BPM | BODY MASS INDEX: 27.9 KG/M2

## 2025-06-04 DIAGNOSIS — Z90.49 STATUS POST LAPAROSCOPIC CHOLECYSTECTOMY: ICD-10-CM

## 2025-06-04 DIAGNOSIS — K80.01 CALCULUS OF GALLBLADDER WITH ACUTE CHOLECYSTITIS AND OBSTRUCTION: Primary | ICD-10-CM

## 2025-06-04 DIAGNOSIS — R35.0 URINARY FREQUENCY: ICD-10-CM

## 2025-06-04 DIAGNOSIS — Z98.890 POSTOPERATIVE STATE: Primary | ICD-10-CM

## 2025-06-04 DIAGNOSIS — Z90.49 S/P LAPAROSCOPIC CHOLECYSTECTOMY: ICD-10-CM

## 2025-06-04 DIAGNOSIS — K80.50 CHOLEDOCHOLITHIASIS: ICD-10-CM

## 2025-06-04 LAB
APPEARANCE: CLEAR
BILIRUBIN: NEGATIVE
GLUCOSE (URINE DIPSTICK): NEGATIVE MG/DL
KETONES (URINE DIPSTICK): NEGATIVE MG/DL
LEUKOCYTES: NEGATIVE
MULTISTIX LOT#: NORMAL NUMERIC
NITRITE, URINE: NEGATIVE
OCCULT BLOOD: NEGATIVE
PH, URINE: 6.5 (ref 4.5–8)
PROTEIN (URINE DIPSTICK): NEGATIVE MG/DL
SPECIFIC GRAVITY: 1.01 (ref 1–1.03)
URINE-COLOR: YELLOW
UROBILINOGEN,SEMI-QN: 0.2 MG/DL (ref 0–1.9)

## 2025-06-04 PROCEDURE — 81003 URINALYSIS AUTO W/O SCOPE: CPT | Performed by: FAMILY MEDICINE

## 2025-06-04 PROCEDURE — 99215 OFFICE O/P EST HI 40 MIN: CPT | Performed by: FAMILY MEDICINE

## 2025-06-04 NOTE — PROGRESS NOTES
Follow Up Visit Note       Active Problems      1. Postoperative state    2. Status post laparoscopic cholecystectomy          Chief Complaint   Chief Complaint   Patient presents with    Post-Op     PO 5/28 LAPAROSCOPIC CHOLECYSTECTOMY WITH INDOCYANINE GREEN CHOLANGIOGRAPHY AND PLACEMENT OF KATE DRAIN w/Kelsie- reports abdominal swelling, no fevers or chills, frequent urination since last night          History of Present Illness    Shayan Paul is a 69-year-old male who presents to clinic for continued care and evaluation following laparoscopic cholecystectomy on 5/28/2025 with Dr. Iglesias.    The patient presented to [facility] on 5/31/2025 after noticing a change in his GRECIA drain output color.  He states the drain output changed from serosanguineous to serous.  He underwent abdominal ultrasound which revealed free fluid in the gallbladder fossa and Morison's pouch.  A drain was visualized at the gallbladder fossa.  The patient was ultimately discharged home with close general surgery follow-up.    He presents today for postoperative visit.    He reports doing well since discharge home from the hospital.  He reports some abdominal discomfort specifically at his drain site.  He also reports associated abdominal bloating.  He denies nausea, vomiting, fevers or chills.  He is tolerating a diet without issues.  He reports normal return of bowel function.    He continues to wash his incision sites with soap and water daily.  He denies concern for incision site infection.    His drain output continues to decrease and has been less than 10 cc over the past 24 hours.    The patient also reports frequent urination since yesterday.    Allergies  Femi has no known allergies.    Past Medical / Surgical / Social / Family History    The past medical and past surgical history have been reviewed by me today.    Past Medical History[1]  Past Surgical History[2]    The family history and social history have been reviewed by me  today.    Family History[3]  Social Hx on file[4]   Medications - Current[5]     Review of Systems  The Review of Systems has been reviewed by me during today.  Review of Systems   Constitutional:  Negative for chills, diaphoresis, fatigue, fever and unexpected weight change.   HENT:  Negative for hearing loss, nosebleeds, sore throat and trouble swallowing.    Respiratory:  Negative for apnea, cough, shortness of breath and wheezing.    Cardiovascular:  Negative for chest pain, palpitations and leg swelling.   Gastrointestinal:  Positive for abdominal distention and abdominal pain. Negative for anal bleeding, blood in stool, constipation, diarrhea, nausea and vomiting.   Genitourinary:  Negative for difficulty urinating, dysuria, frequency and urgency.   Musculoskeletal:  Negative for arthralgias and myalgias.   Skin:  Negative for color change and rash.   Neurological:  Negative for tremors, syncope and weakness.   Hematological:  Negative for adenopathy. Does not bruise/bleed easily.   Psychiatric/Behavioral:  Negative for behavioral problems and sleep disturbance.         Physical Findings   /72   Pulse 65   Temp 97.3 °F (36.3 °C) (Temporal)   Resp 20   SpO2 95%   Physical Exam  Constitutional:       Appearance: Normal appearance.   HENT:      Head: Normocephalic and atraumatic.   Eyes:      Extraocular Movements: Extraocular movements intact.      Pupils: Pupils are equal, round, and reactive to light.   Pulmonary:      Effort: Pulmonary effort is normal. No respiratory distress.   Abdominal:      General: Abdomen is flat. Bowel sounds are normal. There is no distension.      Palpations: Abdomen is soft. There is no mass.      Tenderness: There is no abdominal tenderness. There is no guarding or rebound.          Comments: Laparoscopic incisions are dry, clean, and intact with Steri-Strips in place.  No surrounding erythema or drainage.  No signs of infection.  There is healing ecchymosis surrounding  the patient's umbilicus and suprapubic area.    GRECIA drain in right lower quadrant with minimal serous output.  The drain was removed during today's visit.  A dry dressing and tape was placed over the previous drain site.  The patient tolerated this well.   Musculoskeletal:         General: Normal range of motion.      Cervical back: Normal range of motion.   Skin:     General: Skin is warm.      Coloration: Skin is not jaundiced or pale.      Findings: No erythema.   Neurological:      General: No focal deficit present.      Mental Status: He is alert and oriented to person, place, and time.       Pathology  Final Diagnosis:   Gallbladder, cholecystectomy:  - Necrotizing severe acute suppurative gangrenous cholecystitis.  - Multiple intraluminal mixed gallstones, 1.9 x 0.3 x 0.2 cm in aggregate.   E        Assessment   1. Postoperative state    2. Status post laparoscopic cholecystectomy        Plan   Overall, the patient continues to do well postoperatively. I had a lengthy discussion with the patient and his wife regarding his postoperative state.   I advised the patient it is common to experience discomfort near his drain site.  This should be alleviated as his drain was removed during today's visit.  Continue p.o. Tylenol or Motrin as needed for pain control.  Continue to wash incision sites with soap and water daily.  He may apply heating pad over the area of ecchymosis as needed for comfort.  Continue diet as tolerated.  He is to maintain a 20 pound lifting restriction until 6 weeks after surgery.  Surgical pathology reviewed during today's visit.  He is to follow-up as needed.  I advised the patient to follow up with his PCP in regards to urinary frequency.  All of the patient's question concerns were addressed.  He expresses understanding and is in agreement with this plan.     No orders of the defined types were placed in this encounter.      Imaging & Referrals   None    Follow Up  No follow-ups on  file.    GREGORY Sanchez           [1]   Past Medical History:   Calculus of kidney    Essential hypertension    Essential hypertension    High blood pressure    History of COVID-19    Pt cant recall exact month. Had cold like S/S for a couple of days.    Osteoarthritis    Paroxysmal supraventricular tachycardia (HCC)    PONV (postoperative nausea and vomiting)    Visual impairment    glasses   [2]   Past Surgical History:  Procedure Laterality Date    Cholecystectomy  05/28/2025    LAPAROSCOPIC CHOLECYSTECTOMY WITH INDOCYANINE GREEN CHOLANGIOGRAPHY AND PLACEMENT OF KATE DRAIN    Colonoscopy      Hip replacement surgery Right 09/12/2023    Neck/chest procedure unlisted  1988    right neck lymph    Other Right 2018    Right knee repair, not replacement.     Repair of nasal septum  1998   [3]   Family History  Problem Relation Age of Onset    Cancer Father         Lung   [4]   Social History  Socioeconomic History    Marital status:    Tobacco Use    Smoking status: Never    Smokeless tobacco: Never   Vaping Use    Vaping status: Never Used   Substance and Sexual Activity    Alcohol use: Yes     Alcohol/week: 0.0 standard drinks of alcohol     Comment: 1 drink a week    Drug use: No   Other Topics Concern    Caffeine Concern Yes     Comment: 1 cup of coffee daily    Exercise Yes     Comment: 4-5 days a week    Seat Belt Yes   [5]   Current Outpatient Medications:     oxyCODONE 5 MG Oral Tab, Take 1 tablet (5 mg total) by mouth every 6 (six) hours as needed. (Patient not taking: Reported on 6/4/2025), Disp: 10 tablet, Rfl: 0    AMLODIPINE 5 MG Oral Tab, TAKE 1 TABLET(5 MG) BY MOUTH DAILY, Disp: 90 tablet, Rfl: 1

## 2025-06-04 NOTE — PROGRESS NOTES
Chief Complaint   Patient presents with    ER F/U     Patient here for ER follow up  5/31/25 Hakeem Green MD  Postoperative pain      HPI:   Femi Paul is a 69 year old male who presents to the office for hospital ER follow-up.  Patient was admitted May 26 for gallstones and underwent surgical excision with a laparoscopic cholecystectomy with Dr. Iglesias.  The afternoon of May 26, began having epigastric pain and radiating to the left chest.  Elected to do ERCP initially on the 27th (and removed the stone which helped a good amount of the pain), but ended up having the surgical procedure on the 28th.  Necrotic gallbladder, and drain was placed.  Was released the following day, 29th.      Pains still present despite the pain meds, and color change from the drain, ended up going back to the ER on the 31st.  The fluid from the drain changed from watermelon juice color to more clear-yellow.  US done, and he was reassured and DC home again.      Continued on OxyContin until Monday night, then combination of tylenol and Advil.     Last night, awake every half an hour for urinary urgency.  Urine about q1-2 hours through the night up until a few hours ago.    Normal bowels.   Since the frequency stopped, normal urine, and no dysuria.    Saw surgery team earlier today.  Wounds examined.  Able to remove the drain.  Progressing well.        REVIEW OF SYSTEMS:   Constitutional: negative.  Pains better  Eyes: negative  Ears, nose, mouth, throat, and face: negative  Respiratory: negative  Cardiovascular: negative  Gastrointestinal: negative.  Return to normal.   Genitourinary: frequency, as noted above  Neurological: negative    EXAM:   /70   Pulse 61   Resp 16   Ht 6' (1.829 m)   Wt 206 lb (93.4 kg)   SpO2 95%   BMI 27.94 kg/m²     General appearance - alert, well appearing, and in no distress and with wife  Chest - clear to auscultation, no wheezes, rales or rhonchi, symmetric air entry  Heart - normal  rate, regular rhythm, normal S1, S2, no murmurs, rubs, clicks or gallops  Abdomen - surgical sites healing well.  Bruising periumbilical.   R flank with drain site healing.  Bandaged, but no drainage visible.    Neurological - alert, oriented, normal speech, no focal findings or movement disorder noted  Extremities - peripheral pulses normal, no pedal edema, no clubbing or cyanosis    Urine today:  normal.  No abnormalities    CT Chest: CONCLUSION:     1. Mild intra and extrahepatic biliary dilatation.  There is an approximately 6 mm calculus of the distal common bile duct, likely the source of obstruction.  Recommend correlation with LFTs.    2. Concentric bladder wall thickening, likely accentuated by under distention, though cystitis may be contributory.  Recommend correlation with urinalysis.     Post op US abdomen: FINDINGS:     There is free fluid within the gallbladder fossa and Morison's pouch.  A drain is partially visualized at the gallbladder fossa.  There may be a trace right pleural effusion.  Unremarkable sonographic appearance of the liver.  Common bile duct measures 5    mm.  Pancreas not well assessed. If the patient's symptoms persist or worsen, consider further assessment with CT.     Component      Latest Ref Rng 5/26/2025   Glucose      70 - 99 mg/dL 118 (H)    Sodium      136 - 145 mmol/L 138    Potassium      3.5 - 5.1 mmol/L 3.7    Chloride      98 - 112 mmol/L 103    Carbon Dioxide, Total      21.0 - 32.0 mmol/L 26.0    ANION GAP      0 - 18 mmol/L 9    BUN      9 - 23 mg/dL 25 (H)    CREATININE      0.70 - 1.30 mg/dL 1.14    CALCIUM      8.7 - 10.6 mg/dL 10.3    CALCULATED OSMOLALITY      275 - 295 mOsm/kg 291    EGFR      >=60 mL/min/1.73m2 69    AST (SGOT)      <34 U/L 39 (H)    ALT (SGPT)      10 - 49 U/L 80 (H)    ALKALINE PHOSPHATASE      45 - 117 U/L 117    Total Bilirubin      0.2 - 1.1 mg/dL 0.4    PROTEIN, TOTAL      5.7 - 8.2 g/dL 8.0    Albumin      3.2 - 4.8 g/dL 5.6 (H)     Globulin      2.0 - 3.5 g/dL 2.4    A/G Ratio      1.0 - 2.0  2.3 (H)       Component      Latest Ref Rng 5/26/2025 5/27/2025 5/28/2025 5/29/2025 5/31/2025   WBC      4.0 - 11.0 x10(3) uL 7.9  17.0 (H)  14.1 (H)  12.5 (H)  10.2    RBC      3.80 - 5.80 x10(6)uL 5.08  4.94  4.34  4.03  4.75    Hemoglobin      13.0 - 17.5 g/dL 15.1  14.4  12.6 (L)  12.1 (L)  13.6    Hematocrit      39.0 - 53.0 % 43.7  42.1  38.0 (L)  35.6 (L)  41.6    Platelet Count      150.0 - 450.0 10(3)uL 155.0  172.0  149.0 (L)  145.0 (L)  213.0       Legend:  (H) High  (L) Low  ASSESSMENT AND PLAN:     Femi Paul was seen in the office today:  had concerns including ER F/U (Patient here for ER follow up/5/31/25 Hakeem Green MD/Postoperative pain).    1. Calculus of gallbladder with acute cholecystitis and obstruction  2. Choledocholithiasis  3. S/P laparoscopic cholecystectomy  Status post uneventful week where he developed symptoms, underwent ERCP and ultimately laparoscopic cholecystectomy.  Gallbladder was quite distended and gangrenous at the time of surgery.  We discussed the gallbladder, its role in the body and what he can and cannot do going forward.  Mainly be aware of fat digestion especially with larger boluses of fat in meals  Recovery is going well.  Able to get the drain removed today.  Pain is controlled.  Normal bowel activity.  I encouraged him to continue to refrain from heavy lifting for another month.  1 to avoid herniation on any of the open surgical sites.  Continue to follow the surgical recommendations on this  Reviewed imaging with the patient and his wife while we were in the office.    4. Urinary frequency  Unclear cause  UA today was normal  Seems to have resolved.    - URINALYSIS, AUTO, W/O SCOPE            Office visit lasted 50 minutes, of which this time was spent counseling the patient on the events of the hospitalization and surgery.  The role of the gallbladder, what to expect moving forward,  surgical recovery.  We also discussed his urinary symptoms and the testing related to this.      Note to patient: The 21 Century Cures Act makes medical notes like these available to patients in the interest of transparency. However, be advised this is a medical document. It is intended as peer to peer communication. It is written in medical language and may contain abbreviations or verbiage that are unfamiliar. It may appear blunt or direct. Medical documents are intended to carry relevant information, facts as evident, and the clinical opinion of the practitioner.

## 2025-07-13 DIAGNOSIS — I10 ESSENTIAL HYPERTENSION, BENIGN: ICD-10-CM

## 2025-07-17 RX ORDER — AMLODIPINE BESYLATE 5 MG/1
5 TABLET ORAL DAILY
Qty: 90 TABLET | Refills: 3 | Status: SHIPPED | OUTPATIENT
Start: 2025-07-17

## 2025-07-17 NOTE — TELEPHONE ENCOUNTER
Refill Per Protocol     Requested Prescriptions   Pending Prescriptions Disp Refills    AMLODIPINE 5 MG Oral Tab [Pharmacy Med Name: AMLODIPINE BESYLATE 5MG TABLETS] 90 tablet 1     Sig: TAKE 1 TABLET(5 MG) BY MOUTH DAILY       Hypertension Medications Protocol Passed - 7/17/2025  1:59 PM        Passed - CMP or BMP in past 12 months        Passed - Last BP reading less than 140/90     BP Readings from Last 1 Encounters:   06/04/25 130/70               Passed - In person appointment or virtual visit in the past 12 mos or appointment in next 3 mos     Recent Outpatient Visits              1 month ago Calculus of gallbladder with acute cholecystitis and obstruction    [facility]    Office Visit    1 month ago Postoperative state    [facility], [facility],Danielle Reyes PA    Office Visit    10 months ago Annual physical exam    [facility]    Office Visit    11 months ago Status post right hip replacement    [facility], [address] [place] [address] Jatinder Hansen MD    Office Visit    1 year ago     [facility] in [place] Tasha Suarez, PT    Office Visit                      Passed - EGFRCR or GFRNAA > 50     GFR Evaluation  EGFRCR: 68 , resulted on 5/31/2025          Passed - Medication is active on med list

## 2025-07-22 ENCOUNTER — PATIENT MESSAGE (OUTPATIENT)
Facility: CLINIC | Age: 70
End: 2025-07-22

## (undated) DIAGNOSIS — R35.1 BPH ASSOCIATED WITH NOCTURIA: ICD-10-CM

## (undated) DIAGNOSIS — Z12.5 PROSTATE CANCER SCREENING: ICD-10-CM

## (undated) DIAGNOSIS — E55.9 VITAMIN D DEFICIENCY: ICD-10-CM

## (undated) DIAGNOSIS — Z13.6 SCREENING FOR CARDIOVASCULAR CONDITION: ICD-10-CM

## (undated) DIAGNOSIS — N40.1 BPH ASSOCIATED WITH NOCTURIA: ICD-10-CM

## (undated) DIAGNOSIS — I10 ESSENTIAL HYPERTENSION, BENIGN: Primary | ICD-10-CM

## (undated) DIAGNOSIS — I10 ESSENTIAL HYPERTENSION, BENIGN: ICD-10-CM

## (undated) DEVICE — E-Z BUTTON SWITCH PENCIL

## (undated) DEVICE — CLIP APPLIER WITH CLIP LOGIC TECHNOLOGY: Brand: ENDO CLIP III

## (undated) DEVICE — 1200CC GUARDIAN II: Brand: GUARDIAN

## (undated) DEVICE — #11 STERILE BLADE: Brand: POLYMER COATED BLADES

## (undated) DEVICE — HOOD, PEEL-AWAY: Brand: FLYTE

## (undated) DEVICE — SUT VICRYL 2-0 CP-1 J266H

## (undated) DEVICE — TRADITIONAL MARYLAND DISSECTOR TIP, DISPOSABLE: Brand: RENEW

## (undated) DEVICE — LAP CHOLE/APPY CDS-LF: Brand: MEDLINE INDUSTRIES, INC.

## (undated) DEVICE — CAUTERY TIP BLADE EXTENSION

## (undated) DEVICE — BANDAGE ADH 1INX3IN NAT FAB N ADH PD CURAD

## (undated) DEVICE — GLOVE SURG SENSICARE SZ 7-1/2

## (undated) DEVICE — GLOVE SURG SENSICARE SZ 7

## (undated) DEVICE — KIT VLV 5 PC AIR H2O SUCT BX ENDOGATOR CONN

## (undated) DEVICE — SINGLE USE DISTAL COVER MAJ-2315: Brand: SINGLE USE DISTAL COVER

## (undated) DEVICE — BIPOLAR SEALER 23-112-1 AQM 6.0: Brand: AQUAMANTYS™

## (undated) DEVICE — KIT CUSTOM ENDOPROCEDURE STERIS

## (undated) DEVICE — ENDOPATH ULTRA VERESS INSUFFLATION NEEDLES WITH LUER LOCK CONNECTORS: Brand: ENDOPATH

## (undated) DEVICE — HOOD: Brand: FLYTE

## (undated) DEVICE — STERILE POLYISOPRENE POWDER-FREE SURGICAL GLOVES: Brand: PROTEXIS

## (undated) DEVICE — LAPCLINCH GRASPER TIP, DISPOSABLE: Brand: RENEW

## (undated) DEVICE — L-HOOK CAUTERY PROBE TIP, DISPOSABLE: Brand: RENEW

## (undated) DEVICE — SLEEVE COMPR MD KNEE LEN SGL USE KENDALL SCD

## (undated) DEVICE — BIPOLAR ELECTROHEMOSTASIS CATHETER: Brand: GOLD PROBE

## (undated) DEVICE — TOTAL HIP CDS: Brand: MEDLINE INDUSTRIES, INC.

## (undated) DEVICE — DALE ABDOMINAL BINDER, 12" WIDE, STRETCHES TO FIT 30"-45", 1 PER BOX.: Brand: DALE ABDOMINAL BINDER

## (undated) DEVICE — SYRINGE MED 10ML LL TIP W/O SFTY DISP

## (undated) DEVICE — 3M™ STERI-STRIP™ REINFORCED ADHESIVE SKIN CLOSURES, R1548, 1 IN X 5 IN (25 MM X 125 MM), 4 STRIPS/ENVELOPE: Brand: 3M™ STERI-STRIP™

## (undated) DEVICE — TIP CLEANER: Brand: VALLEYLAB

## (undated) DEVICE — Device: Brand: STABLECUT®

## (undated) DEVICE — 3M™ RED DOT™ MONITORING ELECTRODE WITH FOAM TAPE AND STICKY GEL, 50/BAG, 20/CASE, 72/PLT 2570: Brand: RED DOT™

## (undated) DEVICE — POSITIONER OR KT PT CR

## (undated) DEVICE — C-ARM: Brand: UNBRANDED

## (undated) DEVICE — SHEET,DRAPE,70X100,STERILE: Brand: MEDLINE

## (undated) DEVICE — SUT MONOCRYL 4-0 PS-2 Y496G

## (undated) DEVICE — V2 SPECIMEN COLLECTION MANIFOLD KIT: Brand: NEPTUNE

## (undated) DEVICE — HANDPIECE SET WITH HIGH FLOW TIP AND SUCTION TUBE: Brand: INTERPULSE

## (undated) DEVICE — BNDG COHESIVE W4INXL5YD TAN E

## (undated) DEVICE — LIGHT HANDLE

## (undated) DEVICE — PAIN TRAY: Brand: MEDLINE INDUSTRIES, INC.

## (undated) DEVICE — TROCAR: Brand: KII SHIELDED BLADED ACCESS SYSTEM

## (undated) DEVICE — SKIN MARKER DUAL TIP WITH RULER CAP AND LABELS: Brand: DEVON

## (undated) DEVICE — BIT DRL 30MM 3.2MM RNGLC ACTB

## (undated) DEVICE — SUT COAT VCRL+ 0 27IN UR-6 ABSRB VLT ANTIBACT

## (undated) DEVICE — SHEET,DRAPE,40X58,STERILE: Brand: MEDLINE

## (undated) DEVICE — DERMABOND CLOSURE 0.7ML TOPICL

## (undated) DEVICE — TUBING MEGADYNE SPECULUM

## (undated) DEVICE — GLOVE SUR 6.5 SENSICARE PI PIP CRM PWD F

## (undated) DEVICE — SUT SILK 0 A306H

## (undated) DEVICE — AVANOS* TUOHY EPIDURAL NEEDLE: Brand: AVANOS

## (undated) DEVICE — GOLDVAC PUSH BUTTON ELECTROSURGICAL SMOKE EVACUATION HANDPIECE: Brand: GOLDVAC

## (undated) DEVICE — UNDYED BRAIDED (POLYGLACTIN 910), SYNTHETIC ABSORBABLE SUTURE: Brand: COATED VICRYL

## (undated) DEVICE — APPLICATOR PREP 26ML CHG 2% ISO ALC 70%

## (undated) DEVICE — SUT ETHLN 2-0 18IN FS NABSRB BLK 26MM 3/8 CIR

## (undated) DEVICE — SPONGE GZ 4IN X 4IN RAYON POLY 6 PLY UNIQUE

## (undated) DEVICE — TROCAR: Brand: KII® SLEEVE

## (undated) DEVICE — RETRIEVAL BALLOON CATHETER: Brand: EXTRACTOR™ PRO RX

## (undated) DEVICE — SOLUTION IRRIG 1000ML 0.9% NACL USP BTL

## (undated) DEVICE — Device: Brand: SUTURE PASSOR PRO

## (undated) DEVICE — Device

## (undated) DEVICE — COVER,LIGHT,CAMERA,HARD,1/PK,STRL: Brand: MEDLINE

## (undated) DEVICE — STERILE POLYISOPRENE POWDER-FREE SURGICAL GLOVES WITH EMOLLIENT COATING: Brand: PROTEXIS

## (undated) DEVICE — SNAPLOC WIRE GUIDE LOCKING DEVICE: Brand: SNAPLOC

## (undated) DEVICE — LAPAROTOMY SPONGE - RF AND X-RAY DETECTABLE PRE-WASHED: Brand: SITUATE

## (undated) DEVICE — EVACUATOR SUR 100CC SIL BLB WND

## (undated) DEVICE — ACROBAT 2 CALIBRATED TIP WIRE GUIDE: Brand: ACROBAT

## (undated) DEVICE — REMOVER PREP SOLUTION 4OZ

## (undated) DEVICE — VISUALIZATION SYSTEM: Brand: CLEARIFY

## (undated) DEVICE — PDS II VLT 0 107CM AG ST3: Brand: ENDOLOOP

## (undated) DEVICE — POUCH SPECIMEN WIRE 6X3 250ML

## (undated) DEVICE — 2, DISPOSABLE SUCTION/IRRIGATOR WITHOUT DISPOSABLE TIP: Brand: STRYKEFLOW

## (undated) DEVICE — ENDOCUT SCISSOR TIP, DISPOSABLE: Brand: RENEW

## (undated) DEVICE — SYRINGE 30ML LL TIP

## (undated) DEVICE — SUT COAT VCRL + 0 54IN ABSRB UD ANTIBACT

## (undated) DEVICE — NEPTUNE E-SEP SMOKE EVACUATION PENCIL, COATED, 70MM BLADE, PUSH BUTTON SWITCH: Brand: NEPTUNE E-SEP

## (undated) DEVICE — BINDER ABD UNISX 9IN 62IN L AND XL UNIV

## (undated) DEVICE — BITEBLOCK ENDOSCP 60FR MAXI STRP

## (undated) DEVICE — SLEEVE KENDALL SCD EXPRESS MED

## (undated) DEVICE — DRAIN SUR 19FR L0.25IN 3/4 FLUT 3/16IN TRCR

## (undated) DEVICE — NEEDLE SPINAL 18X3-1/2 PINK.

## (undated) DEVICE — 40580 - THE PINK PAD - ADVANCED TRENDELENBURG POSITIONING KIT: Brand: 40580 - THE PINK PAD - ADVANCED TRENDELENBURG POSITIONING KIT

## (undated) DEVICE — GOWN AERO CHROME XXL

## (undated) DEVICE — GRABBER GRASPER TIP, DISPOSABLE: Brand: RENEW

## (undated) DEVICE — 10FT COMBINED O2 DELIVERY/CO2 MONITORING. FILTER WITH MICROSTREAM TYPE LUER: Brand: DUAL ADULT NASAL CANNULA

## (undated) DEVICE — BANDAID CURAD 3IN X 1IN

## (undated) DEVICE — CANNULATING SPHINCTEROTOME: Brand: AUTOTOME™ RX 44

## (undated) NOTE — LETTER
Date: 1/8/2025    Patient Name: Femi Paul    To Whom it may concern:    This letter has been written at the patient's request.    At this time Femi Paul does not need to be premedicated for dental work as per the current recommendation of the [employer] (AAHKS).  If there are any questions or concerns, please do not hesitate to contact our office.  We do however recommend patient waits 3 month post procedure before proceeding with any elective dental work. Date of surgery 9/12/24.         Sincerely,    Jatinder Hansen MD

## (undated) NOTE — LETTER
Date & Time: 5/26/2025, 7:35 PM  Patient: Femi Paul  Encounter Provider(s):    He Llanos DO       To Whom It May Concern:    Femi Paul was seen and treated in our department on 5/26/2025. He should be off work Tuesday 5/27/25.    If you have any questions or concerns, please do not hesitate to call.        _____________________________  Physician/APC Signature

## (undated) NOTE — LETTER
23  [facility] Orthopedic Surgery   Pre-Operative Clearance Request    Patient Name:   Alba Sharma             :   1955    Surgeon: Dr. Jamey Jessica             Date of Surgery: 23    Surgical Procedure: RIGHT ANTERIOR TOTAL HIP ARTHROPLASTY. Please complete all of the following 2-3 weeks PRIOR TO your scheduled surgery to avoid potential cancellation. [x]  History and Physical       [x]  Medical  Clearance                     Required pre-op testing to be ordered:                        [x]  CBC W/Diff                                                                   [x]  CMP                                                                             [x]  PT/INR    [x]  PTT     [x]  Type and Screen                    **Please fax test results, H&P, and clearance to [phone] and to [facility] at [phone]**

## (undated) NOTE — LETTER
[address], [address] [place] [address] [facility]VA [facility]  [phone]       PATIENT NAME: Bekah Batch   : 1955       Waiver      DATE: 1/15/2021     Dear Patient,    Thank you for choosing

## (undated) NOTE — LETTER
Date: 1/8/2025    Patient Name: Femi Paul    To Whom it may concern:    This letter has been written at the patient's request.    At this time Femi Paul does not need to be premedicated for dental work as per the current recommendation of the [employer] Hip [employer] (AAHKS).  If there are any questions or concerns, please do not hesitate to contact our office.  We do however recommend patient waits 3 month post procedure before proceeding with any elective dental work. Date of surgery 9/12/23.         Sincerely,    Jatinder Hansen MD

## (undated) NOTE — LETTER
[address]  [address]  Authorization for Surgical Operation and Procedure     Date:___________                                                                                                         Time:__________  I hereby authorize  DR RENATO HANKS, my physician and his/her assistants (if applicable), which may include medical students, residents, and/or fellows, to perform the following surgical operation/ procedure and administer such anesthesia as may be determined necessary by my physician:  Operation/Procedure name (s)      Laparoscopic cholecystectomy with ICG; possible open         on Femi Paul   2.   I recognize that during the surgical operation/procedure, unforeseen conditions may necessitate additional or different procedures than those listed above.  I, therefore, further authorize and request that the above-named surgeon, assistants, or designees perform such procedures as are, in their judgment, necessary and desirable.    3.   My surgeon/physician has discussed prior to my surgery the potential benefits, risks and side effects of this procedure; the likelihood of achieving goals; and potential problems that might occur during recuperation.  They also discussed reasonable alternatives to the procedure, including risks, benefits, and side effects related to the alternatives and risks related to not receiving this procedure.  I have had all my questions answered and I acknowledge that no guarantee has been made as to the result that may be obtained.    4.   Should the need arise during my operation/procedure, which includes change of level of care prior to discharge, I also consent to the administration of blood and/or blood products.  Further, I understand that despite careful testing and screening of blood or blood products by collecting agencies, I may still be subject to ill effects as a result of receiving a blood transfusion and/or blood  products.  The following are some, but not all, of the potential risks that can occur: fever and allergic reactions, hemolytic reactions, transmission of diseases such as Hepatitis, AIDS and Cytomegalovirus (CMV) and fluid overload.  In the event that I wish to have an autologous transfusion of my own blood, or a directed donor transfusion, I will discuss this with my physician.  Check only if Refusing Blood or Blood Products  I understand refusal of blood or blood products as deemed necessary by my physician may have serious consequences to my condition to include possible death. I hereby assume responsibility for my refusal and release the hospital, its personnel, and my physicians from any responsibility for the consequences of my refusal.          o  Refuse      5.   I authorize the use of any specimen, organs, tissues, body parts or foreign objects that may be removed from my body during the operation/procedure for diagnosis, research or teaching purposes and their subsequent disposal by hospital authorities.  I also authorize the release of specimen test results and/or written reports to my treating physician on the hospital medical staff or other referring or consulting physicians involved in my care, at the discretion of the Pathologist or my treating physician.    6.   I consent to the photographing or videotaping of the operations or procedures to be performed, including appropriate portions of my body for medical, scientific, or educational purposes, provided my identity is not revealed by the pictures or by descriptive texts accompanying them.  If the procedure has been photographed/videotaped, the surgeon will obtain the original picture, image, videotape or CD.  The hospital will not be responsible for storage, release or maintenance of the picture, image, tape or CD.    7.   I consent to the presence of a  or observers in the operating room as deemed necessary by my physician or  their designees.    8.   I recognize that in the event my procedure results in extended X-Ray/fluoroscopy time, I may develop a skin reaction.    9. If I have a Do Not Attempt Resuscitation (DNAR) order in place, that status will be suspended while in the operating room, procedural suite, and during the recovery period unless otherwise explicitly stated by me (or a person authorized to consent on my behalf). The surgeon or my attending physician will determine when the applicable recovery period ends for purposes of reinstating the DNAR order.  10. Patients having a sterilization procedure: I understand that if the procedure is successful the results will be permanent and it will therefore be impossible for me to inseminate, conceive, or bear children.  I also understand that the procedure is intended to result in sterility, although the result has not been guaranteed.   11. I acknowledge that my physician has explained sedation/analgesia administration to me including the risk and benefits I consent to the administration of sedation/analgesia as may be necessary or desirable in the judgment of my physician.    I CERTIFY THAT I HAVE READ AND FULLY UNDERSTAND THE ABOVE CONSENT TO OPERATION and/or OTHER PROCEDURE.    _________________________________________  __________________________________  Signature of Patient     Signature of Responsible Person         ___________________________________         Printed Name of Responsible Person           _________________________________                 Relationship to Patient  _________________________________________  ______________________________  Signature of Witness          Date  Time      Patient Name: Femi Paul     : 1955                 Printed: May 27, 2025     Medical Record #: [id number]                     Page 2 of [address] [place] [address]  [id number]    Consent for Anesthesia    I,  Femi Paul agree to be cared for by an anesthesiologist, who is specially trained to monitor me and give me medicine to put me to sleep or keep me comfortable during my procedure    I understand that my anesthesiologist is not an employee or agent of [facility] or [employer]. He or she works for [employer].    As the patient asking for anesthesia services, I agree to:  Allow the anesthesiologist (anesthesia doctor) to give me medicine and do additional procedures as necessary. Some examples are: Starting or using an “IV” to give me medicine, fluids or blood during my procedure, and having a breathing tube placed to help me breathe when I’m asleep (intubation). In the event that my heart stops working properly, I understand that my anesthesiologist will make every effort to sustain my life, unless otherwise directed by [facility] Do Not Resuscitate documents.  Tell my anesthesia doctor before my procedure:  If I am pregnant.  The last time that I ate or drank.  All of the medicines I take (including prescriptions, herbal supplements, and pills I can buy without a prescription (including street drugs/illegal medications). Failure to inform my anesthesiologist about these medicines may increase my risk of anesthetic complications.  If I am allergic to anything or have had a reaction to anesthesia before.  I understand how the anesthesia medicine will help me (benefits).  I understand that with any type of anesthesia medicine there are risks:  The most common risks are: nausea, vomiting, sore throat, muscle soreness, damage to my eyes, mouth, or teeth (from breathing tube placement).  Rare risks include: remembering what happened during my procedure, allergic reactions to medications, injury to my airway, heart, lungs, vision, nerves, or muscles and in extremely rare instances death.  My doctor has explained to me other choices available to me for my care  (alternatives).  Pregnant Patients (“epidural”):  I understand that the risks of having an epidural (medicine given into my back to help control pain during labor), include itching, low blood pressure, difficulty urinating, headache or slowing of the baby’s heart. Very rare risks include infection, bleeding, seizure, irregular heart rhythms and nerve injury.  Regional Anesthesia (“spinal”, “epidural”, & “nerve blocks”):  I understand that rare but potential complications include headache, bleeding, infection, seizure, irregular heart rhythms, and nerve injury.    I can change my mind about having anesthesia services at any time before I get the medicine.    _____________________________________________________________________________  Patient (or Representative) Signature/Relationship to Patient  Date   Time    _____________________________________________________________________________   Name (if used)    Language/Organization   Time    _____________________________________________________________________________  Anesthesiologist Signature     Date   Time  I have discussed the procedure and information above with the patient (or patient’s representative) and answered their questions. The patient or their representative has agreed to have anesthesia services.    _____________________________________________________________________________  Witness        Date   Time  I have verified that the signature is that of the patient or patient’s representative, and that it was signed before the procedure  Patient Name: Femi Paul     : 1955                 Printed: May 27, 2025     Medical Record #: [id number]                     Page 3 of 3

## (undated) NOTE — LETTER
[address]  [address]  Authorization for Surgical Operation and Procedure     Date:___________                                                                                                         Time:__________  I hereby authorize Surgeon(s):  Laz Barreto MD, my physician and his/her assistants (if applicable), which may include medical students, residents, and/or fellows, to perform the following surgical operation/ procedure and administer such anesthesia as may be determined necessary by my physician:  Operation/Procedure name (s) Procedure(s):  ENDOSCOPIC ULTRASOUND (EUS) WITH ENDOSCOPIC RETROGRADE CHOLANGIOPANCREATOGRAPHY  ENDOSCOPIC RETROGRADE CHOLANGIOPANCREATOGRAPHY (ERCP) on Femi Paul   2.   I recognize that during the surgical operation/procedure, unforeseen conditions may necessitate additional or different procedures than those listed above.  I, therefore, further authorize and request that the above-named surgeon, assistants, or designees perform such procedures as are, in their judgment, necessary and desirable.    3.   My surgeon/physician has discussed prior to my surgery the potential benefits, risks and side effects of this procedure; the likelihood of achieving goals; and potential problems that might occur during recuperation.  They also discussed reasonable alternatives to the procedure, including risks, benefits, and side effects related to the alternatives and risks related to not receiving this procedure.  I have had all my questions answered and I acknowledge that no guarantee has been made as to the result that may be obtained.    4.   Should the need arise during my operation/procedure, which includes change of level of care prior to discharge, I also consent to the administration of blood and/or blood products.  Further, I understand that despite careful testing and screening of blood or blood products by collecting agencies, I  may still be subject to ill effects as a result of receiving a blood transfusion and/or blood products.  The following are some, but not all, of the potential risks that can occur: fever and allergic reactions, hemolytic reactions, transmission of diseases such as Hepatitis, AIDS and Cytomegalovirus (CMV) and fluid overload.  In the event that I wish to have an autologous transfusion of my own blood, or a directed donor transfusion, I will discuss this with my physician.  Check only if Refusing Blood or Blood Products  I understand refusal of blood or blood products as deemed necessary by my physician may have serious consequences to my condition to include possible death. I hereby assume responsibility for my refusal and release the hospital, its personnel, and my physicians from any responsibility for the consequences of my refusal.          o  Refuse      5.   I authorize the use of any specimen, organs, tissues, body parts or foreign objects that may be removed from my body during the operation/procedure for diagnosis, research or teaching purposes and their subsequent disposal by hospital authorities.  I also authorize the release of specimen test results and/or written reports to my treating physician on the hospital medical staff or other referring or consulting physicians involved in my care, at the discretion of the Pathologist or my treating physician.    6.   I consent to the photographing or videotaping of the operations or procedures to be performed, including appropriate portions of my body for medical, scientific, or educational purposes, provided my identity is not revealed by the pictures or by descriptive texts accompanying them.  If the procedure has been photographed/videotaped, the surgeon will obtain the original picture, image, videotape or CD.  The hospital will not be responsible for storage, release or maintenance of the picture, image, tape or CD.    7.   I consent to the presence of a   or observers in the operating room as deemed necessary by my physician or their designees.    8.   I recognize that in the event my procedure results in extended X-Ray/fluoroscopy time, I may develop a skin reaction.    9. If I have a Do Not Attempt Resuscitation (DNAR) order in place, that status will be suspended while in the operating room, procedural suite, and during the recovery period unless otherwise explicitly stated by me (or a person authorized to consent on my behalf). The surgeon or my attending physician will determine when the applicable recovery period ends for purposes of reinstating the DNAR order.  10. Patients having a sterilization procedure: I understand that if the procedure is successful the results will be permanent and it will therefore be impossible for me to inseminate, conceive, or bear children.  I also understand that the procedure is intended to result in sterility, although the result has not been guaranteed.   11. I acknowledge that my physician has explained sedation/analgesia administration to me including the risk and benefits I consent to the administration of sedation/analgesia as may be necessary or desirable in the judgment of my physician.    I CERTIFY THAT I HAVE READ AND FULLY UNDERSTAND THE ABOVE CONSENT TO OPERATION and/or OTHER PROCEDURE.    _________________________________________  __________________________________  Signature of Patient     Signature of Responsible Person         ___________________________________         Printed Name of Responsible Person           _________________________________                 Relationship to Patient  _________________________________________  ______________________________  Signature of Witness          Date  Time      Patient Name: Femi Paul     : 1955                 Printed: May 27, 2025     Medical Record #: [id number]                     Page 2 of 3                                     [address]  [address]    Consent for Anesthesia    I, Femi Paul agree to be cared for by an anesthesiologist, who is specially trained to monitor me and give me medicine to put me to sleep or keep me comfortable during my procedure    I understand that my anesthesiologist is not an employee or agent of [facility] or [facility]. He or she works for [employer].    As the patient asking for anesthesia services, I agree to:  Allow the anesthesiologist (anesthesia doctor) to give me medicine and do additional procedures as necessary. Some examples are: Starting or using an “IV” to give me medicine, fluids or blood during my procedure, and having a breathing tube placed to help me breathe when I’m asleep (intubation). In the event that my heart stops working properly, I understand that my anesthesiologist will make every effort to sustain my life, unless otherwise directed by [facility] Do Not Resuscitate documents.  Tell my anesthesia doctor before my procedure:  If I am pregnant.  The last time that I ate or drank.  All of the medicines I take (including prescriptions, herbal supplements, and pills I can buy without a prescription (including street drugs/illegal medications). Failure to inform my anesthesiologist about these medicines may increase my risk of anesthetic complications.  If I am allergic to anything or have had a reaction to anesthesia before.  I understand how the anesthesia medicine will help me (benefits).  I understand that with any type of anesthesia medicine there are risks:  The most common risks are: nausea, vomiting, sore throat, muscle soreness, damage to my eyes, mouth, or teeth (from breathing tube placement).  Rare risks include: remembering what happened during my procedure, allergic reactions to medications, injury to my airway, heart, lungs, vision, nerves, or muscles and in extremely rare  instances death.  My doctor has explained to me other choices available to me for my care (alternatives).  Pregnant Patients (“epidural”):  I understand that the risks of having an epidural (medicine given into my back to help control pain during labor), include itching, low blood pressure, difficulty urinating, headache or slowing of the baby’s heart. Very rare risks include infection, bleeding, seizure, irregular heart rhythms and nerve injury.  Regional Anesthesia (“spinal”, “epidural”, & “nerve blocks”):  I understand that rare but potential complications include headache, bleeding, infection, seizure, irregular heart rhythms, and nerve injury.    I can change my mind about having anesthesia services at any time before I get the medicine.    _____________________________________________________________________________  Patient (or Representative) Signature/Relationship to Patient  Date   Time    _____________________________________________________________________________   Name (if used)    Language/Organization   Time    _____________________________________________________________________________  Anesthesiologist Signature     Date   Time  I have discussed the procedure and information above with the patient (or patient’s representative) and answered their questions. The patient or their representative has agreed to have anesthesia services.    _____________________________________________________________________________  Witness        Date   Time  I have verified that the signature is that of the patient or patient’s representative, and that it was signed before the procedure  Patient Name: Femi Paul     : 1955                 Printed: May 27, 2025     Medical Record #: [id number]                     Page 3 of 3